# Patient Record
Sex: FEMALE | Race: WHITE | Employment: OTHER | URBAN - NONMETROPOLITAN AREA
[De-identification: names, ages, dates, MRNs, and addresses within clinical notes are randomized per-mention and may not be internally consistent; named-entity substitution may affect disease eponyms.]

---

## 2017-07-27 ENCOUNTER — TELEPHONE (OUTPATIENT)
Dept: FAMILY MEDICINE CLINIC | Age: 67
End: 2017-07-27

## 2017-07-27 DIAGNOSIS — E78.5 HYPERLIPEMIA, IDIOPATHIC FAMILIAL: ICD-10-CM

## 2017-07-27 DIAGNOSIS — R03.0 ELEVATED BLOOD PRESSURE READING WITHOUT DIAGNOSIS OF HYPERTENSION: ICD-10-CM

## 2017-07-27 DIAGNOSIS — F41.9 ANXIETY: Primary | ICD-10-CM

## 2017-07-27 DIAGNOSIS — E83.52 HYPERCALCEMIA: ICD-10-CM

## 2017-07-31 DIAGNOSIS — E78.5 HYPERLIPEMIA, IDIOPATHIC FAMILIAL: ICD-10-CM

## 2017-07-31 DIAGNOSIS — R03.0 ELEVATED BLOOD PRESSURE READING WITHOUT DIAGNOSIS OF HYPERTENSION: ICD-10-CM

## 2017-07-31 DIAGNOSIS — F41.9 ANXIETY: ICD-10-CM

## 2017-07-31 DIAGNOSIS — E83.52 HYPERCALCEMIA: ICD-10-CM

## 2017-07-31 LAB
ALBUMIN SERPL-MCNC: 4 G/DL (ref 3.5–5.2)
ALP BLD-CCNC: 76 U/L (ref 35–104)
ALT SERPL-CCNC: 45 U/L (ref 5–33)
ANION GAP SERPL CALCULATED.3IONS-SCNC: 16 MMOL/L (ref 7–19)
AST SERPL-CCNC: 38 U/L (ref 5–32)
BASOPHILS ABSOLUTE: 0.1 K/UL (ref 0–0.2)
BASOPHILS RELATIVE PERCENT: 0.9 % (ref 0–1)
BILIRUB SERPL-MCNC: 0.6 MG/DL (ref 0.2–1.2)
BUN BLDV-MCNC: 11 MG/DL (ref 8–23)
CALCIUM SERPL-MCNC: 10.6 MG/DL (ref 8.8–10.2)
CHLORIDE BLD-SCNC: 99 MMOL/L (ref 98–111)
CHOLESTEROL, TOTAL: 220 MG/DL (ref 160–199)
CO2: 24 MMOL/L (ref 22–29)
CREAT SERPL-MCNC: 0.6 MG/DL (ref 0.5–0.9)
EOSINOPHILS ABSOLUTE: 0.3 K/UL (ref 0–0.6)
EOSINOPHILS RELATIVE PERCENT: 5.1 % (ref 0–5)
GFR NON-AFRICAN AMERICAN: >60
GLUCOSE BLD-MCNC: 86 MG/DL (ref 74–109)
HCT VFR BLD CALC: 39.7 % (ref 37–47)
HDLC SERPL-MCNC: 74 MG/DL (ref 65–121)
HEMOGLOBIN: 13.6 G/DL (ref 12–16)
LDL CHOLESTEROL CALCULATED: 130 MG/DL
LYMPHOCYTES ABSOLUTE: 2.5 K/UL (ref 1.1–4.5)
LYMPHOCYTES RELATIVE PERCENT: 42.8 % (ref 20–40)
MCH RBC QN AUTO: 32 PG (ref 27–31)
MCHC RBC AUTO-ENTMCNC: 34.3 G/DL (ref 33–37)
MCV RBC AUTO: 93.4 FL (ref 81–99)
MONOCYTES ABSOLUTE: 0.5 K/UL (ref 0–0.9)
MONOCYTES RELATIVE PERCENT: 7.9 % (ref 0–10)
NEUTROPHILS ABSOLUTE: 2.5 K/UL (ref 1.5–7.5)
NEUTROPHILS RELATIVE PERCENT: 43.1 % (ref 50–65)
PDW BLD-RTO: 12.2 % (ref 11.5–14.5)
PLATELET # BLD: 321 K/UL (ref 130–400)
PMV BLD AUTO: 10.4 FL (ref 9.4–12.3)
POTASSIUM SERPL-SCNC: 4.5 MMOL/L (ref 3.5–5)
RBC # BLD: 4.25 M/UL (ref 4.2–5.4)
SODIUM BLD-SCNC: 139 MMOL/L (ref 136–145)
TOTAL PROTEIN: 8.7 G/DL (ref 6.6–8.7)
TRIGL SERPL-MCNC: 81 MG/DL (ref 150–199)
WBC # BLD: 5.7 K/UL (ref 4.8–10.8)

## 2017-08-04 ENCOUNTER — OFFICE VISIT (OUTPATIENT)
Dept: FAMILY MEDICINE CLINIC | Age: 67
End: 2017-08-04
Payer: MEDICARE

## 2017-08-04 VITALS
BODY MASS INDEX: 21.94 KG/M2 | HEIGHT: 62 IN | DIASTOLIC BLOOD PRESSURE: 76 MMHG | HEART RATE: 81 BPM | OXYGEN SATURATION: 99 % | SYSTOLIC BLOOD PRESSURE: 124 MMHG | WEIGHT: 119.2 LBS

## 2017-08-04 DIAGNOSIS — E78.2 MIXED HYPERLIPIDEMIA: ICD-10-CM

## 2017-08-04 DIAGNOSIS — R77.9 ELEVATED SERUM PROTEIN LEVEL: ICD-10-CM

## 2017-08-04 DIAGNOSIS — M53.3 COCCYX PAIN: ICD-10-CM

## 2017-08-04 DIAGNOSIS — Z00.00 ROUTINE GENERAL MEDICAL EXAMINATION AT A HEALTH CARE FACILITY: Primary | ICD-10-CM

## 2017-08-04 DIAGNOSIS — Z12.31 ENCOUNTER FOR SCREENING MAMMOGRAM FOR BREAST CANCER: ICD-10-CM

## 2017-08-04 DIAGNOSIS — J39.2 THROAT IRRITATION: ICD-10-CM

## 2017-08-04 DIAGNOSIS — E83.52 HYPERCALCEMIA: ICD-10-CM

## 2017-08-04 PROCEDURE — G0403 EKG FOR INITIAL PREVENT EXAM: HCPCS | Performed by: CLINICAL NURSE SPECIALIST

## 2017-08-04 PROCEDURE — G8400 PT W/DXA NO RESULTS DOC: HCPCS | Performed by: CLINICAL NURSE SPECIALIST

## 2017-08-04 PROCEDURE — G8420 CALC BMI NORM PARAMETERS: HCPCS | Performed by: CLINICAL NURSE SPECIALIST

## 2017-08-04 PROCEDURE — 1123F ACP DISCUSS/DSCN MKR DOCD: CPT | Performed by: CLINICAL NURSE SPECIALIST

## 2017-08-04 PROCEDURE — 99213 OFFICE O/P EST LOW 20 MIN: CPT | Performed by: CLINICAL NURSE SPECIALIST

## 2017-08-04 PROCEDURE — 3017F COLORECTAL CA SCREEN DOC REV: CPT | Performed by: CLINICAL NURSE SPECIALIST

## 2017-08-04 PROCEDURE — 1090F PRES/ABSN URINE INCON ASSESS: CPT | Performed by: CLINICAL NURSE SPECIALIST

## 2017-08-04 PROCEDURE — 1036F TOBACCO NON-USER: CPT | Performed by: CLINICAL NURSE SPECIALIST

## 2017-08-04 PROCEDURE — G8427 DOCREV CUR MEDS BY ELIG CLIN: HCPCS | Performed by: CLINICAL NURSE SPECIALIST

## 2017-08-04 PROCEDURE — 3014F SCREEN MAMMO DOC REV: CPT | Performed by: CLINICAL NURSE SPECIALIST

## 2017-08-04 PROCEDURE — 4040F PNEUMOC VAC/ADMIN/RCVD: CPT | Performed by: CLINICAL NURSE SPECIALIST

## 2017-08-04 PROCEDURE — 93000 ELECTROCARDIOGRAM COMPLETE: CPT | Performed by: CLINICAL NURSE SPECIALIST

## 2017-08-04 ASSESSMENT — ENCOUNTER SYMPTOMS
CONSTIPATION: 0
NAUSEA: 0
EYE PAIN: 0
VOICE CHANGE: 1
SHORTNESS OF BREATH: 0
BACK PAIN: 1
WHEEZING: 0
DIARRHEA: 0
SINUS PRESSURE: 0
SORE THROAT: 1
FACIAL SWELLING: 0
ABDOMINAL PAIN: 0
EYE REDNESS: 0
TROUBLE SWALLOWING: 0
CHEST TIGHTNESS: 0
COUGH: 0
RECTAL PAIN: 0

## 2017-08-04 ASSESSMENT — LIFESTYLE VARIABLES: HOW OFTEN DO YOU HAVE A DRINK CONTAINING ALCOHOL: 0

## 2017-08-04 ASSESSMENT — VISUAL ACUITY
OS_CC: 20/20
OD_CC: 20/25

## 2017-08-04 ASSESSMENT — ANXIETY QUESTIONNAIRES: GAD7 TOTAL SCORE: 2

## 2017-08-04 ASSESSMENT — PATIENT HEALTH QUESTIONNAIRE - PHQ9: SUM OF ALL RESPONSES TO PHQ QUESTIONS 1-9: 0

## 2017-08-08 ENCOUNTER — TELEPHONE (OUTPATIENT)
Dept: FAMILY MEDICINE CLINIC | Age: 67
End: 2017-08-08

## 2017-08-24 ENCOUNTER — OFFICE VISIT (OUTPATIENT)
Dept: OTOLARYNGOLOGY | Age: 67
End: 2017-08-24
Payer: MEDICARE

## 2017-08-24 VITALS
SYSTOLIC BLOOD PRESSURE: 122 MMHG | BODY MASS INDEX: 21.71 KG/M2 | OXYGEN SATURATION: 99 % | HEART RATE: 68 BPM | DIASTOLIC BLOOD PRESSURE: 60 MMHG | WEIGHT: 118 LBS | RESPIRATION RATE: 20 BRPM | TEMPERATURE: 96.6 F | HEIGHT: 62 IN

## 2017-08-24 DIAGNOSIS — J39.2 THROAT IRRITATION: ICD-10-CM

## 2017-08-24 DIAGNOSIS — K21.9 LARYNGOPHARYNGEAL REFLUX (LPR): Primary | ICD-10-CM

## 2017-08-24 PROCEDURE — 1123F ACP DISCUSS/DSCN MKR DOCD: CPT | Performed by: OTOLARYNGOLOGY

## 2017-08-24 PROCEDURE — 1090F PRES/ABSN URINE INCON ASSESS: CPT | Performed by: OTOLARYNGOLOGY

## 2017-08-24 PROCEDURE — 4040F PNEUMOC VAC/ADMIN/RCVD: CPT | Performed by: OTOLARYNGOLOGY

## 2017-08-24 PROCEDURE — 3017F COLORECTAL CA SCREEN DOC REV: CPT | Performed by: OTOLARYNGOLOGY

## 2017-08-24 PROCEDURE — G8427 DOCREV CUR MEDS BY ELIG CLIN: HCPCS | Performed by: OTOLARYNGOLOGY

## 2017-08-24 PROCEDURE — 31575 DIAGNOSTIC LARYNGOSCOPY: CPT | Performed by: OTOLARYNGOLOGY

## 2017-08-24 PROCEDURE — 3014F SCREEN MAMMO DOC REV: CPT | Performed by: OTOLARYNGOLOGY

## 2017-08-24 PROCEDURE — G8400 PT W/DXA NO RESULTS DOC: HCPCS | Performed by: OTOLARYNGOLOGY

## 2017-08-24 PROCEDURE — G8420 CALC BMI NORM PARAMETERS: HCPCS | Performed by: OTOLARYNGOLOGY

## 2017-08-24 PROCEDURE — 1036F TOBACCO NON-USER: CPT | Performed by: OTOLARYNGOLOGY

## 2017-08-24 RX ORDER — RANITIDINE 150 MG/1
150 TABLET ORAL NIGHTLY
Qty: 60 TABLET | Refills: 3 | Status: SHIPPED | OUTPATIENT
Start: 2017-08-24 | End: 2017-11-30 | Stop reason: CLARIF

## 2017-09-19 RX ORDER — ALPRAZOLAM 0.5 MG/1
0.5 TABLET ORAL NIGHTLY PRN
Qty: 30 TABLET | Refills: 0 | Status: SHIPPED | OUTPATIENT
Start: 2017-09-19 | End: 2017-10-19 | Stop reason: SDUPTHER

## 2017-10-19 RX ORDER — ALPRAZOLAM 0.5 MG/1
0.5 TABLET ORAL NIGHTLY PRN
Qty: 30 TABLET | Refills: 0 | Status: SHIPPED | OUTPATIENT
Start: 2017-10-19 | End: 2017-12-06 | Stop reason: SDUPTHER

## 2017-11-30 ENCOUNTER — OFFICE VISIT (OUTPATIENT)
Dept: FAMILY MEDICINE CLINIC | Age: 67
End: 2017-11-30
Payer: MEDICARE

## 2017-11-30 VITALS
HEIGHT: 62 IN | TEMPERATURE: 98.1 F | OXYGEN SATURATION: 99 % | DIASTOLIC BLOOD PRESSURE: 70 MMHG | SYSTOLIC BLOOD PRESSURE: 134 MMHG | HEART RATE: 75 BPM | BODY MASS INDEX: 22.08 KG/M2 | WEIGHT: 120 LBS

## 2017-11-30 DIAGNOSIS — B02.9 HERPES ZOSTER WITHOUT COMPLICATION: Primary | ICD-10-CM

## 2017-11-30 PROCEDURE — 1123F ACP DISCUSS/DSCN MKR DOCD: CPT | Performed by: FAMILY MEDICINE

## 2017-11-30 PROCEDURE — G8484 FLU IMMUNIZE NO ADMIN: HCPCS | Performed by: FAMILY MEDICINE

## 2017-11-30 PROCEDURE — 3017F COLORECTAL CA SCREEN DOC REV: CPT | Performed by: FAMILY MEDICINE

## 2017-11-30 PROCEDURE — 1090F PRES/ABSN URINE INCON ASSESS: CPT | Performed by: FAMILY MEDICINE

## 2017-11-30 PROCEDURE — 1036F TOBACCO NON-USER: CPT | Performed by: FAMILY MEDICINE

## 2017-11-30 PROCEDURE — G8400 PT W/DXA NO RESULTS DOC: HCPCS | Performed by: FAMILY MEDICINE

## 2017-11-30 PROCEDURE — 99213 OFFICE O/P EST LOW 20 MIN: CPT | Performed by: FAMILY MEDICINE

## 2017-11-30 PROCEDURE — 4040F PNEUMOC VAC/ADMIN/RCVD: CPT | Performed by: FAMILY MEDICINE

## 2017-11-30 PROCEDURE — G8420 CALC BMI NORM PARAMETERS: HCPCS | Performed by: FAMILY MEDICINE

## 2017-11-30 PROCEDURE — G8427 DOCREV CUR MEDS BY ELIG CLIN: HCPCS | Performed by: FAMILY MEDICINE

## 2017-11-30 PROCEDURE — 3014F SCREEN MAMMO DOC REV: CPT | Performed by: FAMILY MEDICINE

## 2017-11-30 RX ORDER — ACYCLOVIR 800 MG/1
800 TABLET ORAL
Qty: 35 TABLET | Refills: 0 | Status: SHIPPED | OUTPATIENT
Start: 2017-11-30 | End: 2017-12-07

## 2017-11-30 NOTE — PROGRESS NOTES
[address] Mejia  [address] [facility]  [address] [facility] [address]  Dept: [phone]  Dept Fax: [phone]  Loc: [phone]    Parvez Jc is a 74 y.o. female who presents today for her medical conditions/complaints as noted below. Parvze Jc is c/o of Rash (possible shingles on right buttock)        HPI:   Patient is here for 5 day hx of rash on right buttock. She has been using nystatin with no relief. She has pruritis. She reports \"bruised type pain\" in right hip. No fever. She reports nausea last night. No nausea, vomiting. HPI    Past Medical History:   Diagnosis Date    Anxiety     Elevated blood pressure reading     Elevated serum protein level     Hypercalcemia     Hyperlipidemia       Past Surgical History:   Procedure Laterality Date    HYSTERECTOMY      TIBIA FRACTURE SURGERY Left     with pradeep in lower left leg       Family History   Problem Relation Age of Onset    High Blood Pressure Mother     Cancer Father     High Cholesterol Brother        Social History   Substance Use Topics    Smoking status: Never Smoker    Smokeless tobacco: Never Used    Alcohol use No      Current Outpatient Prescriptions   Medication Sig Dispense Refill    ALPRAZolam (XANAX) 0.5 MG tablet Take 1 tablet by mouth nightly as needed for Sleep . 30 tablet 0    nystatin (MYCOSTATIN) 946196 UNIT/ML suspension Take 5 mLs by mouth 4 times daily 1 Bottle 1    Ascorbic Acid (VITAMIN C) 500 MG CAPS Take 1 tablet by mouth daily.  aspirin 81 MG tablet Take 81 mg by mouth daily.  melatonin 3 MG TABS tablet Take 3 mg by mouth nightly.  L-LYSINE PO Take 1 tablet by mouth daily. No current facility-administered medications for this visit.       Allergies   Allergen Reactions    Darvocet A500 [Propoxyphene N-Acetaminophen]     Lortab [Hydrocodone-Acetaminophen]     Penicillins      All \"Cillins\"       Health Maintenance   Topic Date Due   

## 2017-12-07 RX ORDER — ALPRAZOLAM 0.5 MG/1
0.5 TABLET ORAL NIGHTLY PRN
Qty: 30 TABLET | Refills: 0 | Status: SHIPPED | OUTPATIENT
Start: 2017-12-07 | End: 2018-01-17 | Stop reason: SDUPTHER

## 2017-12-24 PROBLEM — B02.9 HERPES ZOSTER WITHOUT COMPLICATION: Status: ACTIVE | Noted: 2017-12-24

## 2017-12-24 ASSESSMENT — ENCOUNTER SYMPTOMS
CONSTIPATION: 0
ANAL BLEEDING: 0
COUGH: 0
SHORTNESS OF BREATH: 0
DIARRHEA: 0
CHEST TIGHTNESS: 0
ABDOMINAL PAIN: 0
NAUSEA: 0

## 2018-01-17 RX ORDER — ALPRAZOLAM 0.5 MG/1
0.5 TABLET ORAL NIGHTLY PRN
Qty: 30 TABLET | Refills: 0 | Status: SHIPPED | OUTPATIENT
Start: 2018-01-17 | End: 2018-02-16

## 2018-01-29 DIAGNOSIS — R77.9 ELEVATED SERUM PROTEIN LEVEL: ICD-10-CM

## 2018-01-29 DIAGNOSIS — E83.52 HYPERCALCEMIA: ICD-10-CM

## 2018-01-29 DIAGNOSIS — E78.2 MIXED HYPERLIPIDEMIA: ICD-10-CM

## 2018-01-29 LAB
ALBUMIN SERPL-MCNC: 4.1 G/DL (ref 3.5–5.2)
ALP BLD-CCNC: 76 U/L (ref 35–104)
ALT SERPL-CCNC: 32 U/L (ref 5–33)
ANION GAP SERPL CALCULATED.3IONS-SCNC: 7 MMOL/L (ref 7–19)
AST SERPL-CCNC: 23 U/L (ref 5–32)
BILIRUB SERPL-MCNC: 0.6 MG/DL (ref 0.2–1.2)
BUN BLDV-MCNC: 9 MG/DL (ref 8–23)
CALCIUM SERPL-MCNC: 10 MG/DL (ref 8.8–10.2)
CHLORIDE BLD-SCNC: 99 MMOL/L (ref 98–111)
CHOLESTEROL, TOTAL: 222 MG/DL (ref 160–199)
CO2: 32 MMOL/L (ref 22–29)
CREAT SERPL-MCNC: 0.6 MG/DL (ref 0.5–0.9)
GFR NON-AFRICAN AMERICAN: >60
GLUCOSE BLD-MCNC: 93 MG/DL (ref 74–109)
HDLC SERPL-MCNC: 82 MG/DL (ref 65–121)
LDL CHOLESTEROL CALCULATED: 123 MG/DL
POTASSIUM SERPL-SCNC: 4.2 MMOL/L (ref 3.5–5)
SODIUM BLD-SCNC: 138 MMOL/L (ref 136–145)
TOTAL PROTEIN: 8.3 G/DL (ref 6.6–8.7)
TRIGL SERPL-MCNC: 83 MG/DL (ref 0–149)

## 2018-02-05 ENCOUNTER — OFFICE VISIT (OUTPATIENT)
Dept: FAMILY MEDICINE CLINIC | Age: 68
End: 2018-02-05
Payer: MEDICARE

## 2018-02-05 VITALS
HEART RATE: 79 BPM | OXYGEN SATURATION: 99 % | SYSTOLIC BLOOD PRESSURE: 138 MMHG | WEIGHT: 120 LBS | DIASTOLIC BLOOD PRESSURE: 72 MMHG | HEIGHT: 62 IN | BODY MASS INDEX: 22.08 KG/M2 | TEMPERATURE: 98.6 F

## 2018-02-05 DIAGNOSIS — R77.9 ELEVATED SERUM PROTEIN LEVEL: ICD-10-CM

## 2018-02-05 DIAGNOSIS — E78.2 MIXED HYPERLIPIDEMIA: ICD-10-CM

## 2018-02-05 DIAGNOSIS — E83.52 HYPERCALCEMIA: ICD-10-CM

## 2018-02-05 DIAGNOSIS — E88.09 HYPERPROTEINEMIA: ICD-10-CM

## 2018-02-05 DIAGNOSIS — F41.9 ANXIETY: ICD-10-CM

## 2018-02-05 DIAGNOSIS — Z78.0 POSTMENOPAUSE: ICD-10-CM

## 2018-02-05 DIAGNOSIS — Z79.899 HIGH RISK MEDICATION USE: Primary | ICD-10-CM

## 2018-02-05 PROBLEM — I65.29 CAROTID STENOSIS: Status: ACTIVE | Noted: 2018-02-05

## 2018-02-05 PROBLEM — R03.0 ELEVATED BLOOD PRESSURE READING WITHOUT DIAGNOSIS OF HYPERTENSION: Status: ACTIVE | Noted: 2018-02-05

## 2018-02-05 LAB
AMPHETAMINE SCREEN, URINE: NORMAL
BARBITURATE SCREEN, URINE: NORMAL
BENZODIAZEPINE SCREEN, URINE: POSITIVE
COCAINE METABOLITE SCREEN URINE: NORMAL
MDMA URINE: NORMAL
METHADONE SCREEN, URINE: NORMAL
METHAMPHETAMINE, URINE: NORMAL
OPIATE SCREEN URINE: NORMAL
OXYCODONE SCREEN URINE: NORMAL
PHENCYCLIDINE SCREEN URINE: NORMAL
PROPOXYPHENE SCREEN, URINE: NORMAL
THC: NORMAL
TRICYCLIC ANTIDEPRESSANTS, UR: NORMAL

## 2018-02-05 PROCEDURE — 99213 OFFICE O/P EST LOW 20 MIN: CPT | Performed by: FAMILY MEDICINE

## 2018-02-05 PROCEDURE — G8400 PT W/DXA NO RESULTS DOC: HCPCS | Performed by: FAMILY MEDICINE

## 2018-02-05 PROCEDURE — G8484 FLU IMMUNIZE NO ADMIN: HCPCS | Performed by: FAMILY MEDICINE

## 2018-02-05 PROCEDURE — 3017F COLORECTAL CA SCREEN DOC REV: CPT | Performed by: FAMILY MEDICINE

## 2018-02-05 PROCEDURE — 3014F SCREEN MAMMO DOC REV: CPT | Performed by: FAMILY MEDICINE

## 2018-02-05 PROCEDURE — 4040F PNEUMOC VAC/ADMIN/RCVD: CPT | Performed by: FAMILY MEDICINE

## 2018-02-05 PROCEDURE — G8427 DOCREV CUR MEDS BY ELIG CLIN: HCPCS | Performed by: FAMILY MEDICINE

## 2018-02-05 PROCEDURE — G8420 CALC BMI NORM PARAMETERS: HCPCS | Performed by: FAMILY MEDICINE

## 2018-02-05 PROCEDURE — 1123F ACP DISCUSS/DSCN MKR DOCD: CPT | Performed by: FAMILY MEDICINE

## 2018-02-05 PROCEDURE — 1036F TOBACCO NON-USER: CPT | Performed by: FAMILY MEDICINE

## 2018-02-05 PROCEDURE — 1090F PRES/ABSN URINE INCON ASSESS: CPT | Performed by: FAMILY MEDICINE

## 2018-02-05 PROCEDURE — 80305 DRUG TEST PRSMV DIR OPT OBS: CPT | Performed by: FAMILY MEDICINE

## 2018-02-05 PROCEDURE — G8598 ASA/ANTIPLAT THER USED: HCPCS | Performed by: FAMILY MEDICINE

## 2018-02-05 RX ORDER — ALPRAZOLAM 0.5 MG/1
1 TABLET ORAL 2 TIMES DAILY PRN
COMMUNITY
Start: 2011-07-06 | End: 2018-03-01 | Stop reason: SDUPTHER

## 2018-02-05 ASSESSMENT — ENCOUNTER SYMPTOMS
ABDOMINAL PAIN: 0
COUGH: 0
DIARRHEA: 0
ANAL BLEEDING: 0
CHEST TIGHTNESS: 0
SHORTNESS OF BREATH: 0
CONSTIPATION: 0
NAUSEA: 0

## 2018-02-05 NOTE — PROGRESS NOTES
[address] Mejia  [address] [facility]  [address] [facility] [address]  Dept: [phone]  Dept Fax: [phone]  Loc: [phone]    Krystal Orellana is a 74 y.o. female who presents today for her medical conditions/complaints as noted below. Krystal Orellana is c/o of 6 Month Follow-Up        HPI:   Patient is here for follow up. She is doing well overall. UDS today with positive benzos, takes xanax for prn severe anxiety, usually takes 1/2 tablet of 0.5 mg dose. She takes nightly to help with sleep. No depressed mood. No SI/HI. Labs reviewed, cmp with total protein and calcium wnl. LDL improved to 123, has been working on [employer], regular exercise. Mammogram in August wnl. HPI    Past Medical History:   Diagnosis Date    Anxiety     Elevated blood pressure reading     Elevated serum protein level     Hypercalcemia     Hyperlipidemia       Past Surgical History:   Procedure Laterality Date    HYSTERECTOMY      TIBIA FRACTURE SURGERY Left     with pradeep in lower left leg       Family History   Problem Relation Age of Onset    High Blood Pressure Mother     Cancer Father     High Cholesterol Brother        Social History   Substance Use Topics    Smoking status: Never Smoker    Smokeless tobacco: Never Used    Alcohol use No      Current Outpatient Prescriptions   Medication Sig Dispense Refill    ALPRAZolam (XANAX) 0.5 MG tablet Take 1 tablet by mouth 2 times daily as needed.  ALPRAZolam (XANAX) 0.5 MG tablet Take 1 tablet by mouth nightly as needed for Sleep for up to 30 days. 30 tablet 0    nystatin (MYCOSTATIN) [id number] UNIT/ML suspension Take 5 mLs by mouth 4 times daily 1 Bottle 1    Ascorbic Acid (VITAMIN C) 500 MG CAPS Take 1 tablet by mouth daily.  aspirin 81 MG tablet Take 81 mg by mouth daily.  melatonin 3 MG TABS tablet Take 3 mg by mouth nightly.  L-LYSINE PO Take 1 tablet by mouth daily.        No current mechanisms for stressors, will follow. Hypercalcemia  Stable, not on calcium supplement. Followed by heme onc as well. Elevated serum protein level  Stable, will continue to trend. Mixed hyperlipidemia  Improved, continue working on diet and exercise. Hyperproteinemia    Stable, see above. Schedule DEXA as has not been screened and outside testing with osteopenia in the recent past. No fractures. Return in about 6 months (around 8/5/2018) for Physical, see me. Discussed use, benefit, and side effects of prescribed medications. All patient questions answered. Pt voiced understanding. Reviewed health maintenance. Instructed to continue current medications, diet and exercise. Patient agreed with treatment plan. Follow up as directed.

## 2018-03-01 RX ORDER — ALPRAZOLAM 0.5 MG/1
0.5 TABLET ORAL 2 TIMES DAILY PRN
Qty: 30 TABLET | Refills: 0 | Status: SHIPPED | OUTPATIENT
Start: 2018-03-01 | End: 2018-03-31

## 2018-04-08 RX ORDER — ALPRAZOLAM 0.5 MG/1
0.5 TABLET ORAL 2 TIMES DAILY PRN
Qty: 30 TABLET | Refills: 0 | Status: SHIPPED | OUTPATIENT
Start: 2018-04-08 | End: 2018-05-09 | Stop reason: SDUPTHER

## 2018-05-09 ENCOUNTER — TELEPHONE (OUTPATIENT)
Dept: FAMILY MEDICINE CLINIC | Age: 68
End: 2018-05-09

## 2018-05-09 RX ORDER — ALPRAZOLAM 0.5 MG/1
0.5 TABLET ORAL 2 TIMES DAILY PRN
Qty: 60 TABLET | Refills: 2 | Status: SHIPPED | OUTPATIENT
Start: 2018-05-09 | End: 2018-08-13 | Stop reason: SDUPTHER

## 2018-08-06 DIAGNOSIS — E88.09 HYPERPROTEINEMIA: ICD-10-CM

## 2018-08-06 DIAGNOSIS — E78.2 MIXED HYPERLIPIDEMIA: ICD-10-CM

## 2018-08-06 LAB
ALBUMIN SERPL-MCNC: 4.1 G/DL (ref 3.5–5.2)
ALP BLD-CCNC: 70 U/L (ref 35–104)
ALT SERPL-CCNC: 22 U/L (ref 5–33)
ANION GAP SERPL CALCULATED.3IONS-SCNC: 15 MMOL/L (ref 7–19)
AST SERPL-CCNC: 19 U/L (ref 5–32)
BILIRUB SERPL-MCNC: 0.6 MG/DL (ref 0.2–1.2)
BUN BLDV-MCNC: 10 MG/DL (ref 8–23)
CALCIUM SERPL-MCNC: 10.2 MG/DL (ref 8.8–10.2)
CHLORIDE BLD-SCNC: 98 MMOL/L (ref 98–111)
CHOLESTEROL, TOTAL: 199 MG/DL (ref 160–199)
CO2: 25 MMOL/L (ref 22–29)
CREAT SERPL-MCNC: 0.6 MG/DL (ref 0.5–0.9)
GFR NON-AFRICAN AMERICAN: >60
GLUCOSE BLD-MCNC: 87 MG/DL (ref 74–109)
HDLC SERPL-MCNC: 77 MG/DL (ref 65–121)
LDL CHOLESTEROL CALCULATED: 110 MG/DL
POTASSIUM SERPL-SCNC: 4.1 MMOL/L (ref 3.5–5)
SODIUM BLD-SCNC: 138 MMOL/L (ref 136–145)
TOTAL PROTEIN: 8.5 G/DL (ref 6.6–8.7)
TRIGL SERPL-MCNC: 62 MG/DL (ref 0–149)

## 2018-08-13 ENCOUNTER — OFFICE VISIT (OUTPATIENT)
Dept: FAMILY MEDICINE CLINIC | Age: 68
End: 2018-08-13
Payer: MEDICARE

## 2018-08-13 VITALS
HEART RATE: 85 BPM | BODY MASS INDEX: 21.71 KG/M2 | SYSTOLIC BLOOD PRESSURE: 114 MMHG | HEIGHT: 62 IN | WEIGHT: 118 LBS | DIASTOLIC BLOOD PRESSURE: 72 MMHG | OXYGEN SATURATION: 98 %

## 2018-08-13 DIAGNOSIS — Z12.31 ENCOUNTER FOR SCREENING MAMMOGRAM FOR BREAST CANCER: ICD-10-CM

## 2018-08-13 DIAGNOSIS — Z12.11 ENCOUNTER FOR SCREENING COLONOSCOPY: ICD-10-CM

## 2018-08-13 DIAGNOSIS — E83.52 HYPERCALCEMIA: ICD-10-CM

## 2018-08-13 DIAGNOSIS — Z23 NEED FOR PNEUMOCOCCAL VACCINATION: Primary | ICD-10-CM

## 2018-08-13 DIAGNOSIS — F41.9 ANXIETY: ICD-10-CM

## 2018-08-13 DIAGNOSIS — Z78.0 POSTMENOPAUSE: ICD-10-CM

## 2018-08-13 DIAGNOSIS — E78.2 MIXED HYPERLIPIDEMIA: ICD-10-CM

## 2018-08-13 DIAGNOSIS — Z00.00 ROUTINE GENERAL MEDICAL EXAMINATION AT A HEALTH CARE FACILITY: ICD-10-CM

## 2018-08-13 DIAGNOSIS — Z00.00 ANNUAL PHYSICAL EXAM: ICD-10-CM

## 2018-08-13 DIAGNOSIS — Z78.0 POSTMENOPAUSAL: ICD-10-CM

## 2018-08-13 DIAGNOSIS — E88.09 HYPERPROTEINEMIA: ICD-10-CM

## 2018-08-13 PROBLEM — B02.9 HERPES ZOSTER WITHOUT COMPLICATION: Status: RESOLVED | Noted: 2017-12-24 | Resolved: 2018-08-13

## 2018-08-13 PROCEDURE — G8420 CALC BMI NORM PARAMETERS: HCPCS | Performed by: FAMILY MEDICINE

## 2018-08-13 PROCEDURE — G8400 PT W/DXA NO RESULTS DOC: HCPCS | Performed by: FAMILY MEDICINE

## 2018-08-13 PROCEDURE — G0009 ADMIN PNEUMOCOCCAL VACCINE: HCPCS | Performed by: FAMILY MEDICINE

## 2018-08-13 PROCEDURE — 1123F ACP DISCUSS/DSCN MKR DOCD: CPT | Performed by: FAMILY MEDICINE

## 2018-08-13 PROCEDURE — 1090F PRES/ABSN URINE INCON ASSESS: CPT | Performed by: FAMILY MEDICINE

## 2018-08-13 PROCEDURE — G8427 DOCREV CUR MEDS BY ELIG CLIN: HCPCS | Performed by: FAMILY MEDICINE

## 2018-08-13 PROCEDURE — 90670 PCV13 VACCINE IM: CPT | Performed by: FAMILY MEDICINE

## 2018-08-13 PROCEDURE — G0438 PPPS, INITIAL VISIT: HCPCS | Performed by: FAMILY MEDICINE

## 2018-08-13 PROCEDURE — 3017F COLORECTAL CA SCREEN DOC REV: CPT | Performed by: FAMILY MEDICINE

## 2018-08-13 PROCEDURE — 99213 OFFICE O/P EST LOW 20 MIN: CPT | Performed by: FAMILY MEDICINE

## 2018-08-13 PROCEDURE — 4040F PNEUMOC VAC/ADMIN/RCVD: CPT | Performed by: FAMILY MEDICINE

## 2018-08-13 PROCEDURE — 1101F PT FALLS ASSESS-DOCD LE1/YR: CPT | Performed by: FAMILY MEDICINE

## 2018-08-13 PROCEDURE — G8598 ASA/ANTIPLAT THER USED: HCPCS | Performed by: FAMILY MEDICINE

## 2018-08-13 PROCEDURE — 1036F TOBACCO NON-USER: CPT | Performed by: FAMILY MEDICINE

## 2018-08-13 RX ORDER — ALPRAZOLAM 0.5 MG/1
0.5 TABLET ORAL 2 TIMES DAILY PRN
Qty: 60 TABLET | Refills: 2 | Status: SHIPPED | OUTPATIENT
Start: 2018-08-13 | End: 2019-01-24 | Stop reason: SDUPTHER

## 2018-08-13 ASSESSMENT — PATIENT HEALTH QUESTIONNAIRE - PHQ9
SUM OF ALL RESPONSES TO PHQ QUESTIONS 1-9: 0
SUM OF ALL RESPONSES TO PHQ QUESTIONS 1-9: 0

## 2018-08-13 ASSESSMENT — ANXIETY QUESTIONNAIRES: GAD7 TOTAL SCORE: 1

## 2018-08-13 ASSESSMENT — LIFESTYLE VARIABLES: HOW OFTEN DO YOU HAVE A DRINK CONTAINING ALCOHOL: 0

## 2018-08-13 NOTE — PROGRESS NOTES
times daily as needed for Anxiety for up to 30 days. Marlene Moreno MD   nystatin (MYCOSTATIN) 453635 UNIT/ML suspension Take 5 mLs by mouth 4 times daily  Orville Hickman MD   Ascorbic Acid (VITAMIN C) 500 MG CAPS Take 1 tablet by mouth daily. Historical Provider, MD   aspirin 81 MG tablet Take 81 mg by mouth daily. Historical Provider, MD   melatonin 3 MG TABS tablet Take 3 mg by mouth nightly. Historical Provider, MD   L-LYSINE PO Take 1 tablet by mouth daily. Historical Provider, MD       Past Medical History:   Diagnosis Date    Anxiety     Elevated blood pressure reading     Elevated serum protein level     Hypercalcemia     Hyperlipidemia      Past Surgical History:   Procedure Laterality Date    HYSTERECTOMY      TIBIA FRACTURE SURGERY Left     with pradeep in lower left leg       Family History   Problem Relation Age of Onset    High Blood Pressure Mother     Cancer Father     High Cholesterol Brother        CareTeam (Including outside providers/suppliers regularly involved in providing care):   Patient Care Team:  Orville Hickman MD as PCP - General (Family Medicine)    Wt Readings from Last 3 Encounters:   08/13/18 118 lb (53.5 kg)   02/05/18 120 lb (54.4 kg)   11/30/17 120 lb (54.4 kg)     Vitals:    08/13/18 1439   BP: 114/72   Pulse: 85   SpO2: 98%   Weight: 118 lb (53.5 kg)   Height: 5' 2\" (1.575 m)     Body mass index is 21.58 kg/m². General Appearance: alert and oriented to person, place and time, well developed and well- nourished, in no acute distress  Skin: warm and dry, no rash or erythema  Head: normocephalic and atraumatic  Eyes: pupils equal, round, and reactive to light, extraocular eye movements intact, conjunctivae normal  ENT: tympanic membrane, external ear and ear canal normal bilaterally, nose without deformity, nasal mucosa and turbinates normal without polyps  Neck: supple and non-tender without mass, no thyromegaly or thyroid nodules, no cervical

## 2018-08-23 ASSESSMENT — ENCOUNTER SYMPTOMS
CHEST TIGHTNESS: 0
ANAL BLEEDING: 0
DIARRHEA: 0
SHORTNESS OF BREATH: 0
CONSTIPATION: 0
COUGH: 0
ABDOMINAL PAIN: 0
NAUSEA: 0

## 2018-08-23 NOTE — PROGRESS NOTES
tablet by mouth daily.  aspirin 81 MG tablet Take 81 mg by mouth daily.  melatonin 3 MG TABS tablet Take 3 mg by mouth nightly.  L-LYSINE PO Take 1 tablet by mouth daily. No current facility-administered medications for this visit. Allergies   Allergen Reactions    Darvocet A500 [Propoxyphene N-Acetaminophen]     Propoxyphene     Albuterol Palpitations    Clarithromycin Palpitations    Lortab [Hydrocodone-Acetaminophen] Nausea Only and Nausea And Vomiting    Penicillins Nausea Only and Nausea And Vomiting     All \"Cillins\"       Health Maintenance   Topic Date Due    Hepatitis C screen  1950    Shingles Vaccine (1 of 2 - 2 Dose Series) 07/22/2002    DEXA (modify frequency per FRAX score)  07/22/2017    Flu vaccine (1) 09/01/2018    Colon Cancer Screen FIT/FOBT  11/09/2018    Breast cancer screen  08/04/2019    Pneumococcal low/med risk (2 of 2 - PPSV23) 08/13/2019    DTaP/Tdap/Td vaccine (2 - Td) 07/06/2021    Lipid screen  08/06/2023       Subjective:      Review of Systems   Constitutional: Negative for chills and fever. HENT: Negative for congestion. Respiratory: Negative for cough, chest tightness and shortness of breath. Cardiovascular: Negative for chest pain, palpitations and leg swelling. Gastrointestinal: Negative for abdominal pain, anal bleeding, constipation, diarrhea and nausea. Genitourinary: Negative for difficulty urinating. Psychiatric/Behavioral: Negative. See HPI for visit specific review of symptoms. All others negative      Objective:   /72   Pulse 85   Ht 5' 2\" (1.575 m)   Wt 118 lb (53.5 kg)   SpO2 98%   BMI 21.58 kg/m²   Physical Exam   Constitutional: She appears well-developed. She does not appear ill. Eyes: Pupils are equal, round, and reactive to light. Neck: Normal range of motion. Neck supple. Cardiovascular: Normal rate and regular rhythm. Exam reveals no friction rub.     No murmur heard.  Pulmonary/Chest: Effort normal and breath sounds normal. No respiratory distress. She has no wheezes. She has no rales. Abdominal: Soft. Bowel sounds are normal. She exhibits no distension. There is no tenderness. There is no rebound and no guarding. Musculoskeletal: She exhibits no edema. Neurological: She is alert. Psychiatric: She has a normal mood and affect. Her behavior is normal.         Lab Review   Recent Results (from the past 672 hour(s))   Comprehensive Metabolic Panel    Collection Time: 08/06/18  8:02 AM   Result Value Ref Range    Sodium 138 136 - 145 mmol/L    Potassium 4.1 3.5 - 5.0 mmol/L    Chloride 98 98 - 111 mmol/L    CO2 25 22 - 29 mmol/L    Anion Gap 15 7 - 19 mmol/L    Glucose 87 74 - 109 mg/dL    BUN 10 8 - 23 mg/dL    CREATININE 0.6 0.5 - 0.9 mg/dL    GFR Non-African American >60 >60    Calcium 10.2 8.8 - 10.2 mg/dL    Total Protein 8.5 6.6 - 8.7 g/dL    Alb 4.1 3.5 - 5.2 g/dL    Total Bilirubin 0.6 0.2 - 1.2 mg/dL    Alkaline Phosphatase 70 35 - 104 U/L    ALT 22 5 - 33 U/L    AST 19 5 - 32 U/L   Lipid Panel    Collection Time: 08/06/18  8:02 AM   Result Value Ref Range    Cholesterol, Total 199 160 - 199 mg/dL    Triglycerides 62 0 - 149 mg/dL    HDL 77 65 - 121 mg/dL    LDL Calculated 110 <100 mg/dL               Assessment & Plan: The following diagnoses and conditions are stable with no further orders unless indicated:  Catrachito Welch was seen today for medicare awv. Diagnoses and all orders for this visit:    Need for pneumococcal vaccination  -     PREVNAR 13 IM (Pneumococcal conjugate vaccine 13-valent)    Annual physical exam  See other note. Encounter for screening colonoscopy  -     External Referral To Gastroenterology    Postmenopausal  -     DEXA BONE DENSITY 2 SITES; Future    Encounter for screening mammogram for breast cancer  -     DELMY DIGITAL SCREEN W CAD BILATERAL; Future    Anxiety  -     ALPRAZolam (XANAX) 0.5 MG tablet;  Take 1 tablet by mouth 2 times daily as needed for Anxiety for up to 30 days. Wolfgang Kidd UTD. Encouraged coping mechanisms for stressors. Xanax prn. Mixed hyperlipidemia  Stable, improved, continue lifestyle modification. Hypercalcemia  Stable. Hyperproteinemia  Stable. Postmenopause  Schedule DEXA. Routine general medical examination at a health care facility    Labs reviewed and discussed. Encouraged healthy diet and regular exercise. Immunizations UTD. See HPI for further details. Return for Medicare Annual Wellness Visit in 1 year. Discussed use, benefit, and side effects of prescribed medications. All patient questions answered. Pt voiced understanding. Reviewed health maintenance. Instructed to continue current medications, diet and exercise. Patient agreed with treatment plan. Follow up as directed.

## 2018-09-04 DIAGNOSIS — Z78.0 POSTMENOPAUSAL: ICD-10-CM

## 2018-09-04 DIAGNOSIS — Z78.0 POSTMENOPAUSE: ICD-10-CM

## 2018-09-04 DIAGNOSIS — Z12.31 ENCOUNTER FOR SCREENING MAMMOGRAM FOR BREAST CANCER: ICD-10-CM

## 2018-09-12 ENCOUNTER — TELEPHONE (OUTPATIENT)
Dept: GASTROENTEROLOGY | Facility: CLINIC | Age: 68
End: 2018-09-12

## 2018-09-12 NOTE — TELEPHONE ENCOUNTER
Pt records from physician reviewed  According to medical record Noreen Anderson does not have a history of heart disease or lung disease.  According to medical record Noreen Anderson is not currently on blood thinner.  According to medical record Noreen Anderson is not currently exhibiting abdominal pain, weight loss, hematochezia, melena,  change in bowels, or other symptoms to indicate pt would need to be seen in office.    Will submit order for Noreen Anderson to proceed with CScope    Medication will be verified as well as a lack of GI related symptomology when pt is scheduled for procedure.      CScope (Dr Quintana) 2009-normal

## 2018-09-13 PROBLEM — J39.2 THROAT IRRITATION: Status: ACTIVE | Noted: 2017-08-04

## 2018-09-13 PROBLEM — F41.9 ANXIETY: Status: ACTIVE | Noted: 2018-02-05

## 2018-09-13 PROBLEM — M53.3 COCCYX PAIN: Status: ACTIVE | Noted: 2017-08-04

## 2018-09-13 PROBLEM — R77.9 ELEVATED SERUM PROTEIN LEVEL: Status: ACTIVE | Noted: 2018-09-13

## 2018-09-13 PROBLEM — R03.0 ELEVATED BLOOD-PRESSURE READING WITHOUT DIAGNOSIS OF HYPERTENSION: Status: ACTIVE | Noted: 2018-02-05

## 2018-09-13 PROBLEM — E88.09 HYPERPROTEINEMIA: Status: ACTIVE | Noted: 2018-02-05

## 2018-09-13 PROBLEM — E83.52 HYPERCALCEMIA: Status: ACTIVE | Noted: 2018-09-13

## 2018-09-13 PROBLEM — Z78.0 POSTMENOPAUSE: Status: ACTIVE | Noted: 2018-02-05

## 2018-09-13 PROBLEM — E78.5 HYPERLIPIDEMIA: Status: ACTIVE | Noted: 2018-09-13

## 2018-09-13 PROBLEM — I65.29 CAROTID STENOSIS: Status: ACTIVE | Noted: 2018-02-05

## 2018-09-13 RX ORDER — LANOLIN ALCOHOL/MO/W.PET/CERES
3 CREAM (GRAM) TOPICAL
COMMUNITY

## 2018-09-13 RX ORDER — MULTIVIT-MIN/IRON/FOLIC ACID/K 18-600-40
CAPSULE ORAL
COMMUNITY

## 2018-10-04 ENCOUNTER — OFFICE VISIT (OUTPATIENT)
Dept: GASTROENTEROLOGY | Facility: CLINIC | Age: 68
End: 2018-10-04

## 2018-10-04 VITALS
OXYGEN SATURATION: 100 % | HEART RATE: 73 BPM | HEIGHT: 61 IN | WEIGHT: 117.2 LBS | TEMPERATURE: 96.7 F | BODY MASS INDEX: 22.13 KG/M2 | SYSTOLIC BLOOD PRESSURE: 144 MMHG | DIASTOLIC BLOOD PRESSURE: 84 MMHG

## 2018-10-04 DIAGNOSIS — Z12.11 ENCOUNTER FOR SCREENING FOR MALIGNANT NEOPLASM OF COLON: Primary | ICD-10-CM

## 2018-10-04 PROCEDURE — S0260 H&P FOR SURGERY: HCPCS | Performed by: NURSE PRACTITIONER

## 2018-10-04 RX ORDER — ALPRAZOLAM 0.5 MG/1
0.5 TABLET ORAL 2 TIMES DAILY PRN
COMMUNITY

## 2018-10-04 RX ORDER — DOCUSATE SODIUM 100 MG/1
100 CAPSULE, LIQUID FILLED ORAL DAILY
COMMUNITY

## 2018-10-04 RX ORDER — MELATONIN
1000 2 TIMES DAILY
COMMUNITY

## 2018-10-04 NOTE — PROGRESS NOTES
Chief Complaint   Patient presents with   • Colon Cancer Screening     Colonoscopy 8/31/2009.   • Irritable Bowel Syndrome     Subjective   HPI    Noreen Anderson is a 68 y.o. female who presents to office for preventative maintenance.  There is not  a personal history of colon polyps.  There is not a history of colon cancer.  She does not have complaints of nausea/vomiting, change in bowels, weight loss, no BRBPR, no melena.  There is not a family history of colon cancer.  There is not a family history of colon polyps.  Pt last colonoscopy-2009 .  Bowels do move on regular basis.  Pt states she has a hx of IBS x 10 yrs.  Bowels move regular for most part.    CScope (Dr Quintana) 2009-normal    Past Medical History:   Diagnosis Date   • Anxiety    • Carotid stenosis    • Coccyx pain    • Elevated serum protein level    • Hypercalcemia    • Hyperlipemia    • Hyperlipidemia    • Hypertension    • Postmenopause    • Throat irritation      Past Surgical History:   Procedure Laterality Date   • COLONOSCOPY  08/31/2009    internal hemorrhoids  normal colon   • HYSTERECTOMY     • TIBIA FRACTURE SURGERY Left      Outpatient Prescriptions Marked as Taking for the 10/4/18 encounter (Office Visit) with Miko Johnson APRN   Medication Sig Dispense Refill   • ALPRAZolam (XANAX) 0.5 MG tablet Take 0.5 mg by mouth 2 (Two) Times a Day As Needed for Anxiety. Takes 1 prn.     • Ascorbic Acid (VITAMIN C) 500 MG capsule Take  by mouth.     • aspirin 81 MG tablet Take 81 mg by mouth.     • cholecalciferol (VITAMIN D3) 1000 units tablet Take 1,000 Units by mouth 2 (Two) Times a Day.     • docusate sodium (COLACE) 100 MG capsule Take 100 mg by mouth Daily.     • L-LYSINE PO Take  by mouth.     • melatonin 3 MG tablet Take 3 mg by mouth.       Allergies   Allergen Reactions   • Propoxyphene Unknown (See Comments)   • Sertraline Hcl Unknown (See Comments)   • Albuterol Palpitations   • Clarithromycin Palpitations   •  "Hydrocodone-Acetaminophen Nausea And Vomiting and Nausea Only   • Penicillins Nausea And Vomiting and Nausea Only     All \"Cillins\"     Social History     Social History   • Marital status:      Spouse name: N/A   • Number of children: N/A   • Years of education: N/A     Occupational History   • Not on file.     Social History Main Topics   • Smoking status: Never Smoker   • Smokeless tobacco: Never Used   • Alcohol use No   • Drug use: Unknown   • Sexual activity: Defer     Other Topics Concern   • Not on file     Social History Narrative   • No narrative on file     Family History   Problem Relation Age of Onset   • Colon cancer Neg Hx    • Colon polyps Neg Hx    • Esophageal cancer Neg Hx    • Rectal cancer Neg Hx      Review of Systems   Constitutional: Negative for fatigue, fever and unexpected weight change.   HENT: Negative for hearing loss, sore throat and voice change.    Eyes: Negative for visual disturbance.   Respiratory: Negative for cough, shortness of breath and wheezing.    Cardiovascular: Negative for chest pain and palpitations.   Gastrointestinal: Negative for abdominal pain, blood in stool and vomiting.   Endocrine: Negative for polydipsia and polyuria.   Genitourinary: Negative for difficulty urinating, dysuria, hematuria and urgency.   Musculoskeletal: Negative for joint swelling and myalgias.   Skin: Negative for color change, rash and wound.   Neurological: Negative for dizziness, tremors, seizures and syncope.   Hematological: Does not bruise/bleed easily.   Psychiatric/Behavioral: Negative for agitation and confusion. The patient is not nervous/anxious.      Objective   Vitals:    10/04/18 1258   BP: 144/84   Pulse: 73   Temp: 96.7 °F (35.9 °C)   SpO2: 100%     Physical Exam   Constitutional: She is oriented to person, place, and time. She appears well-developed and well-nourished. She is cooperative.   HENT:   Head: Normocephalic and atraumatic.   Eyes: Pupils are equal, round, and " reactive to light. Conjunctivae are normal. No scleral icterus.   Neck: Normal range of motion. Neck supple. No JVD present. No thyroid mass and no thyromegaly present.   Cardiovascular: Normal rate, regular rhythm and normal heart sounds.  Exam reveals no gallop and no friction rub.    No murmur heard.  Pulmonary/Chest: Effort normal and breath sounds normal. No accessory muscle usage. No respiratory distress. She has no wheezes. She has no rales.   Abdominal: Soft. Normal appearance and bowel sounds are normal. She exhibits no distension, no ascites and no mass. There is no hepatosplenomegaly. There is no tenderness. There is no rebound and no guarding.   Musculoskeletal: Normal range of motion. She exhibits no edema or tenderness.     Vascular Status -  Her right foot exhibits normal foot vasculature  and no edema. Her left foot exhibits normal foot vasculature  and no edema.  Lymphadenopathy:     She has no cervical adenopathy.   Neurological: She is alert and oriented to person, place, and time. She has normal strength. Gait normal.   Skin: Skin is warm, dry and intact. No rash noted.     Imaging Results (most recent)     None        Body mass index is 22.14 kg/m².    Assessment/Plan   Noreen was seen today for colon cancer screening and irritable bowel syndrome.    Diagnoses and all orders for this visit:    Encounter for screening for malignant neoplasm of colon  -     Case Request; Standing  -     Follow Anesthesia Guidelines / Standing Orders; Future  -     Implement Anesthesia Orders Day of Procedure; Standing  -     Obtain Informed Consent; Standing  -     polyethylene glycol (GoLYTELY) 236 g solution; Take 3,785 mL by mouth 1 (One) Time for 1 dose. Take as directed  -     Case Request      COLONOSCOPY WITH ANESTHESIA (N/A)   Pt declined jane arellano miralax gatorade prep    Advised pt to stop ASA, use of NSAIDs, Fish Oil, and MV 5 days prior to procedure, per Dr Quintana protocol.  Tylenol based  products are ok to take.  Pt verbalized understanding.    All risks, benefits, alternatives, and indications of colonoscopy procedure have been discussed with the patient. Risks to include perforation of the colon requiring possible surgery or colostomy, risk of bleeding from biopsies or removal of colon tissue, possibility of missing a colon polyp or cancer, or adverse drug reaction.  Benefits to include the diagnosis and management of disease of the colon and rectum. Alternatives to include barium enema, radiographic evaluation, lab testing or no intervention. Pt verbalizes understanding and agrees.     Patient's Body mass index is 22.14 kg/m². BMI is above normal parameters. Recommendations include: no follow-up required.      There are no Patient Instructions on file for this visit.

## 2018-11-02 ENCOUNTER — TELEPHONE (OUTPATIENT)
Dept: GASTROENTEROLOGY | Facility: CLINIC | Age: 68
End: 2018-11-02

## 2018-11-02 NOTE — TELEPHONE ENCOUNTER
Miko would please confirm instructions for the patient.  Patient states she was told to take Mag Citrate, Miralax, and Ducolax. Is this correct?  She said it's because she is allergic to so many things.   I told her I would check with you first. Please advise. Thank you.    Note: Her cscope is for Monday 11/5/18

## 2018-11-16 ENCOUNTER — TELEPHONE (OUTPATIENT)
Dept: GASTROENTEROLOGY | Facility: CLINIC | Age: 68
End: 2018-11-16

## 2018-11-16 NOTE — TELEPHONE ENCOUNTER
patient called and stated she was unable to do the gallon prep and some how it got all confused. She said she was not having any problems and she would call back next year and schedule another colon.

## 2018-12-28 ENCOUNTER — HOSPITAL ENCOUNTER (OUTPATIENT)
Dept: GENERAL RADIOLOGY | Age: 68
Discharge: HOME | End: 2018-12-28
Payer: MEDICARE

## 2018-12-28 ENCOUNTER — OFFICE VISIT (OUTPATIENT)
Dept: FAMILY MEDICINE CLINIC | Age: 68
End: 2018-12-28
Payer: MEDICARE

## 2018-12-28 VITALS
DIASTOLIC BLOOD PRESSURE: 80 MMHG | TEMPERATURE: 97.8 F | SYSTOLIC BLOOD PRESSURE: 138 MMHG | HEART RATE: 74 BPM | WEIGHT: 118.4 LBS | OXYGEN SATURATION: 99 % | BODY MASS INDEX: 21.66 KG/M2

## 2018-12-28 DIAGNOSIS — M79.672 FOOT PAIN, LEFT: Primary | ICD-10-CM

## 2018-12-28 DIAGNOSIS — M79.672 FOOT PAIN, LEFT: ICD-10-CM

## 2018-12-28 PROCEDURE — 73630 X-RAY EXAM OF FOOT: CPT

## 2018-12-28 PROCEDURE — G8400 PT W/DXA NO RESULTS DOC: HCPCS | Performed by: CLINICAL NURSE SPECIALIST

## 2018-12-28 PROCEDURE — G8484 FLU IMMUNIZE NO ADMIN: HCPCS | Performed by: CLINICAL NURSE SPECIALIST

## 2018-12-28 PROCEDURE — 1101F PT FALLS ASSESS-DOCD LE1/YR: CPT | Performed by: CLINICAL NURSE SPECIALIST

## 2018-12-28 PROCEDURE — 4040F PNEUMOC VAC/ADMIN/RCVD: CPT | Performed by: CLINICAL NURSE SPECIALIST

## 2018-12-28 PROCEDURE — G8427 DOCREV CUR MEDS BY ELIG CLIN: HCPCS | Performed by: CLINICAL NURSE SPECIALIST

## 2018-12-28 PROCEDURE — G8420 CALC BMI NORM PARAMETERS: HCPCS | Performed by: CLINICAL NURSE SPECIALIST

## 2018-12-28 PROCEDURE — 3017F COLORECTAL CA SCREEN DOC REV: CPT | Performed by: CLINICAL NURSE SPECIALIST

## 2018-12-28 PROCEDURE — G8598 ASA/ANTIPLAT THER USED: HCPCS | Performed by: CLINICAL NURSE SPECIALIST

## 2018-12-28 PROCEDURE — 99213 OFFICE O/P EST LOW 20 MIN: CPT | Performed by: CLINICAL NURSE SPECIALIST

## 2018-12-28 PROCEDURE — 1036F TOBACCO NON-USER: CPT | Performed by: CLINICAL NURSE SPECIALIST

## 2018-12-28 PROCEDURE — 1090F PRES/ABSN URINE INCON ASSESS: CPT | Performed by: CLINICAL NURSE SPECIALIST

## 2018-12-28 PROCEDURE — 1123F ACP DISCUSS/DSCN MKR DOCD: CPT | Performed by: CLINICAL NURSE SPECIALIST

## 2018-12-28 RX ORDER — PREDNISONE 20 MG/1
60 TABLET ORAL DAILY
Qty: 15 TABLET | Refills: 0 | Status: SHIPPED | OUTPATIENT
Start: 2018-12-28 | End: 2019-01-02

## 2019-01-24 DIAGNOSIS — F41.9 ANXIETY: ICD-10-CM

## 2019-01-24 RX ORDER — ALPRAZOLAM 0.5 MG/1
0.5 TABLET ORAL 2 TIMES DAILY PRN
Qty: 60 TABLET | Refills: 2 | Status: SHIPPED | OUTPATIENT
Start: 2019-01-24 | End: 2019-02-23

## 2019-03-05 ENCOUNTER — OFFICE VISIT (OUTPATIENT)
Dept: FAMILY MEDICINE CLINIC | Age: 69
End: 2019-03-05
Payer: MEDICARE

## 2019-03-05 VITALS
WEIGHT: 115 LBS | SYSTOLIC BLOOD PRESSURE: 160 MMHG | HEART RATE: 80 BPM | TEMPERATURE: 97.9 F | OXYGEN SATURATION: 99 % | BODY MASS INDEX: 21.16 KG/M2 | DIASTOLIC BLOOD PRESSURE: 82 MMHG | HEIGHT: 62 IN

## 2019-03-05 DIAGNOSIS — I65.23 BILATERAL CAROTID ARTERY STENOSIS: ICD-10-CM

## 2019-03-05 DIAGNOSIS — F41.9 ANXIETY: ICD-10-CM

## 2019-03-05 DIAGNOSIS — R53.1 WEAKNESS: ICD-10-CM

## 2019-03-05 DIAGNOSIS — R53.83 FATIGUE, UNSPECIFIED TYPE: ICD-10-CM

## 2019-03-05 DIAGNOSIS — C90.00 MULTIPLE MYELOMA, REMISSION STATUS UNSPECIFIED (HCC): ICD-10-CM

## 2019-03-05 DIAGNOSIS — R03.0 ELEVATED BLOOD PRESSURE READING WITHOUT DIAGNOSIS OF HYPERTENSION: ICD-10-CM

## 2019-03-05 DIAGNOSIS — R53.83 FATIGUE, UNSPECIFIED TYPE: Primary | ICD-10-CM

## 2019-03-05 LAB
ALBUMIN SERPL-MCNC: 4.4 G/DL (ref 3.5–5.2)
ALP BLD-CCNC: 81 U/L (ref 35–104)
ALT SERPL-CCNC: 27 U/L (ref 5–33)
ANION GAP SERPL CALCULATED.3IONS-SCNC: 14 MMOL/L (ref 7–19)
AST SERPL-CCNC: 23 U/L (ref 5–32)
BASOPHILS ABSOLUTE: 0.1 K/UL (ref 0–0.2)
BASOPHILS RELATIVE PERCENT: 0.8 % (ref 0–1)
BILIRUB SERPL-MCNC: 0.5 MG/DL (ref 0.2–1.2)
BILIRUBIN URINE: NEGATIVE
BLOOD, URINE: NEGATIVE
BUN BLDV-MCNC: 11 MG/DL (ref 8–23)
CALCIUM SERPL-MCNC: 10.4 MG/DL (ref 8.8–10.2)
CHLORIDE BLD-SCNC: 100 MMOL/L (ref 98–111)
CLARITY: CLEAR
CO2: 26 MMOL/L (ref 22–29)
COLOR: YELLOW
CREAT SERPL-MCNC: 0.6 MG/DL (ref 0.5–0.9)
EOSINOPHILS ABSOLUTE: 0.1 K/UL (ref 0–0.6)
EOSINOPHILS RELATIVE PERCENT: 1.2 % (ref 0–5)
GFR NON-AFRICAN AMERICAN: >60
GLUCOSE BLD-MCNC: 103 MG/DL (ref 74–109)
GLUCOSE URINE: NEGATIVE MG/DL
HCT VFR BLD CALC: 41.6 % (ref 37–47)
HEMOGLOBIN: 13.9 G/DL (ref 12–16)
KETONES, URINE: NEGATIVE MG/DL
LEUKOCYTE ESTERASE, URINE: NEGATIVE
LYMPHOCYTES ABSOLUTE: 2.1 K/UL (ref 1.1–4.5)
LYMPHOCYTES RELATIVE PERCENT: 32.6 % (ref 20–40)
MCH RBC QN AUTO: 31.4 PG (ref 27–31)
MCHC RBC AUTO-ENTMCNC: 33.4 G/DL (ref 33–37)
MCV RBC AUTO: 93.9 FL (ref 81–99)
MONOCYTES ABSOLUTE: 0.4 K/UL (ref 0–0.9)
MONOCYTES RELATIVE PERCENT: 5.7 % (ref 0–10)
NEUTROPHILS ABSOLUTE: 3.9 K/UL (ref 1.5–7.5)
NEUTROPHILS RELATIVE PERCENT: 59.2 % (ref 50–65)
NITRITE, URINE: NEGATIVE
PDW BLD-RTO: 12 % (ref 11.5–14.5)
PH UA: 6 (ref 5–8)
PLATELET # BLD: 348 K/UL (ref 130–400)
PMV BLD AUTO: 11 FL (ref 9.4–12.3)
POTASSIUM SERPL-SCNC: 4.7 MMOL/L (ref 3.5–5)
PROTEIN UA: NEGATIVE MG/DL
RBC # BLD: 4.43 M/UL (ref 4.2–5.4)
SODIUM BLD-SCNC: 140 MMOL/L (ref 136–145)
SPECIFIC GRAVITY UA: 1.01 (ref 1–1.03)
T4 FREE: 1 NG/DL (ref 0.9–1.7)
TOTAL PROTEIN: 9 G/DL (ref 6.6–8.7)
TSH SERPL DL<=0.05 MIU/L-ACNC: 2.06 UIU/ML (ref 0.27–4.2)
UROBILINOGEN, URINE: 0.2 E.U./DL
WBC # BLD: 6.5 K/UL (ref 4.8–10.8)

## 2019-03-05 PROCEDURE — G8420 CALC BMI NORM PARAMETERS: HCPCS | Performed by: NURSE PRACTITIONER

## 2019-03-05 PROCEDURE — 4040F PNEUMOC VAC/ADMIN/RCVD: CPT | Performed by: NURSE PRACTITIONER

## 2019-03-05 PROCEDURE — G8598 ASA/ANTIPLAT THER USED: HCPCS | Performed by: NURSE PRACTITIONER

## 2019-03-05 PROCEDURE — 1036F TOBACCO NON-USER: CPT | Performed by: NURSE PRACTITIONER

## 2019-03-05 PROCEDURE — 3017F COLORECTAL CA SCREEN DOC REV: CPT | Performed by: NURSE PRACTITIONER

## 2019-03-05 PROCEDURE — 1123F ACP DISCUSS/DSCN MKR DOCD: CPT | Performed by: NURSE PRACTITIONER

## 2019-03-05 PROCEDURE — 99214 OFFICE O/P EST MOD 30 MIN: CPT | Performed by: NURSE PRACTITIONER

## 2019-03-05 PROCEDURE — 1101F PT FALLS ASSESS-DOCD LE1/YR: CPT | Performed by: NURSE PRACTITIONER

## 2019-03-05 PROCEDURE — 1090F PRES/ABSN URINE INCON ASSESS: CPT | Performed by: NURSE PRACTITIONER

## 2019-03-05 PROCEDURE — G8484 FLU IMMUNIZE NO ADMIN: HCPCS | Performed by: NURSE PRACTITIONER

## 2019-03-05 PROCEDURE — G8400 PT W/DXA NO RESULTS DOC: HCPCS | Performed by: NURSE PRACTITIONER

## 2019-03-05 PROCEDURE — G8427 DOCREV CUR MEDS BY ELIG CLIN: HCPCS | Performed by: NURSE PRACTITIONER

## 2019-03-05 ASSESSMENT — ENCOUNTER SYMPTOMS
ABDOMINAL PAIN: 0
NAUSEA: 0
WHEEZING: 0
DIARRHEA: 0
CHEST TIGHTNESS: 0
SORE THROAT: 0
COUGH: 0
SHORTNESS OF BREATH: 0

## 2019-03-05 ASSESSMENT — PATIENT HEALTH QUESTIONNAIRE - PHQ9
2. FEELING DOWN, DEPRESSED OR HOPELESS: 1
SUM OF ALL RESPONSES TO PHQ QUESTIONS 1-9: 1
1. LITTLE INTEREST OR PLEASURE IN DOING THINGS: 0
SUM OF ALL RESPONSES TO PHQ QUESTIONS 1-9: 1
SUM OF ALL RESPONSES TO PHQ9 QUESTIONS 1 & 2: 1

## 2019-03-06 ENCOUNTER — TELEPHONE (OUTPATIENT)
Dept: FAMILY MEDICINE CLINIC | Age: 69
End: 2019-03-06

## 2019-03-06 DIAGNOSIS — E83.52 HYPERCALCEMIA: Primary | ICD-10-CM

## 2019-03-06 DIAGNOSIS — R77.9 ELEVATED BLOOD PROTEIN: ICD-10-CM

## 2019-03-15 ENCOUNTER — TELEPHONE (OUTPATIENT)
Dept: FAMILY MEDICINE CLINIC | Age: 69
End: 2019-03-15

## 2019-05-28 RX ORDER — DICYCLOMINE HCL 20 MG
20 TABLET ORAL 3 TIMES DAILY PRN
Qty: 90 TABLET | Refills: 1 | Status: SHIPPED | OUTPATIENT
Start: 2019-05-28 | End: 2020-01-09 | Stop reason: CLARIF

## 2019-06-03 ENCOUNTER — HOSPITAL ENCOUNTER (OUTPATIENT)
Age: 69
Discharge: HOME | End: 2019-06-03
Payer: MEDICARE

## 2019-06-03 PROCEDURE — 85025 COMPLETE CBC W/AUTO DIFF WBC: CPT

## 2019-08-09 DIAGNOSIS — E83.52 HYPERCALCEMIA: ICD-10-CM

## 2019-08-09 LAB
ANION GAP SERPL CALCULATED.3IONS-SCNC: 13 MMOL/L (ref 7–19)
BUN BLDV-MCNC: 9 MG/DL (ref 8–23)
CALCIUM IONIZED: 1.3 MMOL/L (ref 1.12–1.32)
CALCIUM SERPL-MCNC: 10.3 MG/DL (ref 8.8–10.2)
CHLORIDE BLD-SCNC: 99 MMOL/L (ref 98–111)
CO2: 27 MMOL/L (ref 22–29)
CREAT SERPL-MCNC: 0.6 MG/DL (ref 0.5–0.9)
GFR NON-AFRICAN AMERICAN: >60
GLUCOSE BLD-MCNC: 94 MG/DL (ref 74–109)
PARATHYROID HORMONE INTACT: 30.3 PG/ML (ref 15–65)
POTASSIUM SERPL-SCNC: 4.3 MMOL/L (ref 3.5–5)
SODIUM BLD-SCNC: 139 MMOL/L (ref 136–145)

## 2019-08-12 VITALS
HEART RATE: 71 BPM | WEIGHT: 115 LBS | DIASTOLIC BLOOD PRESSURE: 82 MMHG | HEIGHT: 62 IN | SYSTOLIC BLOOD PRESSURE: 130 MMHG | BODY MASS INDEX: 21.16 KG/M2

## 2019-08-12 DIAGNOSIS — Z85.79 HISTORY OF MULTIPLE MYELOMA: ICD-10-CM

## 2019-08-15 ENCOUNTER — OFFICE VISIT (OUTPATIENT)
Dept: FAMILY MEDICINE CLINIC | Age: 69
End: 2019-08-15
Payer: MEDICARE

## 2019-08-15 VITALS
BODY MASS INDEX: 20.72 KG/M2 | DIASTOLIC BLOOD PRESSURE: 80 MMHG | TEMPERATURE: 99.4 F | WEIGHT: 112.6 LBS | OXYGEN SATURATION: 99 % | SYSTOLIC BLOOD PRESSURE: 136 MMHG | HEIGHT: 62 IN | HEART RATE: 55 BPM

## 2019-08-15 DIAGNOSIS — C90.00 MULTIPLE MYELOMA, REMISSION STATUS UNSPECIFIED (HCC): ICD-10-CM

## 2019-08-15 DIAGNOSIS — Z85.79 HISTORY OF MULTIPLE MYELOMA: ICD-10-CM

## 2019-08-15 DIAGNOSIS — Z00.00 MEDICARE ANNUAL WELLNESS VISIT, SUBSEQUENT: ICD-10-CM

## 2019-08-15 DIAGNOSIS — F41.9 ANXIETY: Primary | ICD-10-CM

## 2019-08-15 DIAGNOSIS — M81.0 AGE-RELATED OSTEOPOROSIS WITHOUT CURRENT PATHOLOGICAL FRACTURE: ICD-10-CM

## 2019-08-15 DIAGNOSIS — I65.23 BILATERAL CAROTID ARTERY STENOSIS: ICD-10-CM

## 2019-08-15 DIAGNOSIS — E78.2 MIXED HYPERLIPIDEMIA: ICD-10-CM

## 2019-08-15 DIAGNOSIS — R77.9 ELEVATED SERUM PROTEIN LEVEL: ICD-10-CM

## 2019-08-15 PROBLEM — J39.2 THROAT IRRITATION: Status: RESOLVED | Noted: 2017-08-04 | Resolved: 2019-08-15

## 2019-08-15 LAB
AMPHETAMINE SCREEN, URINE: NORMAL
BARBITURATE SCREEN, URINE: NORMAL
BENZODIAZEPINE SCREEN, URINE: POSITIVE
BUPRENORPHINE URINE: NORMAL
COCAINE METABOLITE SCREEN URINE: NORMAL
GABAPENTIN SCREEN, URINE: NORMAL
MDMA URINE: NORMAL
METHADONE SCREEN, URINE: NORMAL
METHAMPHETAMINE, URINE: NORMAL
OPIATE SCREEN URINE: NORMAL
OXYCODONE SCREEN URINE: NORMAL
PHENCYCLIDINE SCREEN URINE: NORMAL
PROPOXYPHENE SCREEN, URINE: NORMAL
THC SCREEN, URINE: NORMAL
TRICYCLIC ANTIDEPRESSANTS, UR: NORMAL

## 2019-08-15 PROCEDURE — G8420 CALC BMI NORM PARAMETERS: HCPCS | Performed by: FAMILY MEDICINE

## 2019-08-15 PROCEDURE — 3017F COLORECTAL CA SCREEN DOC REV: CPT | Performed by: FAMILY MEDICINE

## 2019-08-15 PROCEDURE — 99213 OFFICE O/P EST LOW 20 MIN: CPT | Performed by: FAMILY MEDICINE

## 2019-08-15 PROCEDURE — G0439 PPPS, SUBSEQ VISIT: HCPCS | Performed by: FAMILY MEDICINE

## 2019-08-15 PROCEDURE — 1123F ACP DISCUSS/DSCN MKR DOCD: CPT | Performed by: FAMILY MEDICINE

## 2019-08-15 PROCEDURE — 4040F PNEUMOC VAC/ADMIN/RCVD: CPT | Performed by: FAMILY MEDICINE

## 2019-08-15 PROCEDURE — G8427 DOCREV CUR MEDS BY ELIG CLIN: HCPCS | Performed by: FAMILY MEDICINE

## 2019-08-15 PROCEDURE — G8400 PT W/DXA NO RESULTS DOC: HCPCS | Performed by: FAMILY MEDICINE

## 2019-08-15 PROCEDURE — 1090F PRES/ABSN URINE INCON ASSESS: CPT | Performed by: FAMILY MEDICINE

## 2019-08-15 PROCEDURE — 80305 DRUG TEST PRSMV DIR OPT OBS: CPT | Performed by: FAMILY MEDICINE

## 2019-08-15 PROCEDURE — G8598 ASA/ANTIPLAT THER USED: HCPCS | Performed by: FAMILY MEDICINE

## 2019-08-15 PROCEDURE — 1036F TOBACCO NON-USER: CPT | Performed by: FAMILY MEDICINE

## 2019-08-15 RX ORDER — ALPRAZOLAM 0.5 MG/1
0.5 TABLET ORAL 2 TIMES DAILY PRN
Qty: 60 TABLET | Refills: 2 | Status: SHIPPED | OUTPATIENT
Start: 2019-08-15 | End: 2019-09-14

## 2019-08-15 RX ORDER — RALOXIFENE HYDROCHLORIDE 60 MG/1
60 TABLET, FILM COATED ORAL DAILY
Qty: 7 TABLET | Refills: 3 | Status: SHIPPED | OUTPATIENT
Start: 2019-08-15 | End: 2019-11-15

## 2019-08-15 ASSESSMENT — ENCOUNTER SYMPTOMS
SHORTNESS OF BREATH: 0
ABDOMINAL PAIN: 0
COUGH: 0
NAUSEA: 0
CONSTIPATION: 0
CHEST TIGHTNESS: 0
ANAL BLEEDING: 0
DIARRHEA: 0

## 2019-08-15 ASSESSMENT — PATIENT HEALTH QUESTIONNAIRE - PHQ9
SUM OF ALL RESPONSES TO PHQ QUESTIONS 1-9: 1
SUM OF ALL RESPONSES TO PHQ QUESTIONS 1-9: 1

## 2019-08-27 ENCOUNTER — HOSPITAL ENCOUNTER (OUTPATIENT)
Dept: INFUSION THERAPY | Age: 69
Discharge: HOME | End: 2019-08-27
Payer: MEDICARE

## 2019-08-27 DIAGNOSIS — C90.00 MULTIPLE MYELOMA, REMISSION STATUS UNSPECIFIED (HCC): Primary | ICD-10-CM

## 2019-08-27 DIAGNOSIS — C90.00 MULTIPLE MYELOMA, REMISSION STATUS UNSPECIFIED (HCC): ICD-10-CM

## 2019-09-03 NOTE — PROGRESS NOTES
urgency, no hematuria, no kidney stones;   Musculoskeletal: no joint pain, swelling , stiffness;   Endocrine: no polyuria, polydypsia, no cold or heat intolerence; Hematology/lymphatic: no easy brusing or bleeding, no hx of clotting disorder; no peripheral adenopathy. Dermatology: no skin rash, no eczema, no pruritis;   Psychiatry: no depression, no anxiety,no panic attacks, no suicide ideation; Neurology: no syncope, no seizures, no numbness or tingling of hands, no numbness or tingling of feet, no paresis;     PHYSICAL EXAM:    Vitals signs:  /70   Pulse 64   Ht 5' 2\" (1.575 m)   Wt 111 lb 6.4 oz (50.5 kg)   SpO2 99%   BMI 20.38 kg/m²    Pain scale:  Pain Score:   0 - No pain     CONSTITUTIONAL: Alert, appropriate, no acute distress,   EYES: Non icteric, EOM intact, pupils equal round and reactive to light and accommodation. ENT: Oral mucus membranes moist, no oral pharyngeal lesions. External inspection of ears and nose are normal.   NECK: Supple, no masses. No palpable thyroid mass    CHEST/LUNGS: CTA bilaterally, normal respiratory effort   CARDIOVASCULAR: RRR, no murmurs. No lower extremity edema   ABDOMEN: soft non-tender, active bowel sounds, no hepatosplenomegaly. No palpable masses. EXTREMITIES: warm, Full ROM of all fours extremities. No focal weakness. SKIN: warm, dry with no rashes or lesions  LYMPH: No cervical, clavicular, axillary, or inguinal lymphadenopathy  NEUROLOGIC: follows commands, non focal.   PSYCH: mood and affect appropriate. Alert and oriented to time and place and person. Relevant Lab findings/reviewed by me:    · 6/3/2019- SPEP showed M spike 2.2, IgG 2345  · 8/9/2019-PTH = 30 (WNL)  · 8/23/2019- bone survey was unremarkable and showed no lytic lesions. · 8/27/2019- SPEP showed M spike 2.0  · 9/4/2019- CMP today showed a normal creatinine 0.6, calcium 10.4 hemoglobin 14.1, WBC 5.8, platelets 684,852. No CRAB criteria.        Relevant Imaging studies/reviewed

## 2019-09-04 ENCOUNTER — HOSPITAL ENCOUNTER (OUTPATIENT)
Dept: INFUSION THERAPY | Age: 69
Discharge: HOME | End: 2019-09-04
Payer: MEDICARE

## 2019-09-04 ENCOUNTER — OFFICE VISIT (OUTPATIENT)
Dept: HEMATOLOGY | Age: 69
End: 2019-09-04
Payer: MEDICARE

## 2019-09-04 VITALS
BODY MASS INDEX: 20.5 KG/M2 | WEIGHT: 111.4 LBS | DIASTOLIC BLOOD PRESSURE: 70 MMHG | HEIGHT: 62 IN | OXYGEN SATURATION: 99 % | SYSTOLIC BLOOD PRESSURE: 110 MMHG | HEART RATE: 64 BPM

## 2019-09-04 DIAGNOSIS — D47.2 SMOLDERING MULTIPLE MYELOMA: ICD-10-CM

## 2019-09-04 DIAGNOSIS — C90.00 MULTIPLE MYELOMA, REMISSION STATUS UNSPECIFIED (HCC): Primary | ICD-10-CM

## 2019-09-04 PROCEDURE — 99214 OFFICE O/P EST MOD 30 MIN: CPT | Performed by: INTERNAL MEDICINE

## 2019-09-04 PROCEDURE — 36415 COLL VENOUS BLD VENIPUNCTURE: CPT

## 2019-09-04 PROCEDURE — 99211 OFF/OP EST MAY X REQ PHY/QHP: CPT

## 2019-09-04 PROCEDURE — 85025 COMPLETE CBC W/AUTO DIFF WBC: CPT

## 2019-09-04 PROCEDURE — 80053 COMPREHEN METABOLIC PANEL: CPT

## 2019-11-06 RX ORDER — TRAZODONE HYDROCHLORIDE 50 MG/1
50 TABLET ORAL NIGHTLY PRN
Qty: 30 TABLET | Refills: 5 | Status: SHIPPED | OUTPATIENT
Start: 2019-11-06 | End: 2020-01-09 | Stop reason: CLARIF

## 2019-11-15 ENCOUNTER — OFFICE VISIT (OUTPATIENT)
Dept: FAMILY MEDICINE CLINIC | Age: 69
End: 2019-11-15
Payer: MEDICARE

## 2019-11-15 VITALS
BODY MASS INDEX: 20.7 KG/M2 | WEIGHT: 113.2 LBS | SYSTOLIC BLOOD PRESSURE: 152 MMHG | HEART RATE: 72 BPM | DIASTOLIC BLOOD PRESSURE: 82 MMHG | TEMPERATURE: 97.4 F | OXYGEN SATURATION: 97 %

## 2019-11-15 DIAGNOSIS — M25.551 ACUTE RIGHT HIP PAIN: ICD-10-CM

## 2019-11-15 DIAGNOSIS — F41.9 ANXIETY: Primary | ICD-10-CM

## 2019-11-15 DIAGNOSIS — C90.00 MULTIPLE MYELOMA, REMISSION STATUS UNSPECIFIED (HCC): ICD-10-CM

## 2019-11-15 PROCEDURE — 4040F PNEUMOC VAC/ADMIN/RCVD: CPT | Performed by: CLINICAL NURSE SPECIALIST

## 2019-11-15 PROCEDURE — G8598 ASA/ANTIPLAT THER USED: HCPCS | Performed by: CLINICAL NURSE SPECIALIST

## 2019-11-15 PROCEDURE — 3017F COLORECTAL CA SCREEN DOC REV: CPT | Performed by: CLINICAL NURSE SPECIALIST

## 2019-11-15 PROCEDURE — G8427 DOCREV CUR MEDS BY ELIG CLIN: HCPCS | Performed by: CLINICAL NURSE SPECIALIST

## 2019-11-15 PROCEDURE — G8420 CALC BMI NORM PARAMETERS: HCPCS | Performed by: CLINICAL NURSE SPECIALIST

## 2019-11-15 PROCEDURE — 1123F ACP DISCUSS/DSCN MKR DOCD: CPT | Performed by: CLINICAL NURSE SPECIALIST

## 2019-11-15 PROCEDURE — 1090F PRES/ABSN URINE INCON ASSESS: CPT | Performed by: CLINICAL NURSE SPECIALIST

## 2019-11-15 PROCEDURE — 1036F TOBACCO NON-USER: CPT | Performed by: CLINICAL NURSE SPECIALIST

## 2019-11-15 PROCEDURE — G8484 FLU IMMUNIZE NO ADMIN: HCPCS | Performed by: CLINICAL NURSE SPECIALIST

## 2019-11-15 PROCEDURE — 99213 OFFICE O/P EST LOW 20 MIN: CPT | Performed by: CLINICAL NURSE SPECIALIST

## 2019-11-15 PROCEDURE — G8400 PT W/DXA NO RESULTS DOC: HCPCS | Performed by: CLINICAL NURSE SPECIALIST

## 2019-11-15 ASSESSMENT — ENCOUNTER SYMPTOMS
BACK PAIN: 0
NAUSEA: 0
CHEST TIGHTNESS: 0
ABDOMINAL PAIN: 0
SHORTNESS OF BREATH: 0
EYE PAIN: 0
VOMITING: 0
WHEEZING: 0
EYE DISCHARGE: 0
DIARRHEA: 1
TROUBLE SWALLOWING: 0
SINUS PRESSURE: 0
FACIAL SWELLING: 0
EYE REDNESS: 0
CONSTIPATION: 0
COUGH: 0
COLOR CHANGE: 0
SORE THROAT: 0

## 2020-01-02 ENCOUNTER — HOSPITAL ENCOUNTER (OUTPATIENT)
Dept: INFUSION THERAPY | Age: 70
Discharge: HOME | End: 2020-01-02
Payer: MEDICARE

## 2020-01-02 DIAGNOSIS — D47.2 SMOLDERING MULTIPLE MYELOMA: ICD-10-CM

## 2020-01-02 DIAGNOSIS — C90.00 MULTIPLE MYELOMA, REMISSION STATUS UNSPECIFIED (HCC): ICD-10-CM

## 2020-01-08 NOTE — PROGRESS NOTES
Daniella Angel   1950 1/9/2020     Chief Complaint   Patient presents with    Multiple Myeloma     Multiple myeloma, remission status unspecified         INTERVAL HISTORY/HISTORY OF PRESENT ILLNESS:    Diagnosis  · Smoldering multiple myeloma, November 2016  · IgG kappa restricted    Treatment summary  · Close laboratory/clinical surveillance    Interval history  Ms Dee Dee Gutierrez is a pleasant 81years old  female with smoldering multiple myeloma followed by Dr. Radha Marinelli who was last seen in the office on 11/23/2016. She was seen previously by Delaney Fairchild in June 2019. Further myeloma studies were requested and she is here today for the follow-up. She has no new complaints. She denies any back pain. She also had a bone survey performed. 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 Rufina Ordaz for evaluation of a monoclonal protein finding. She was seen by her primary care for a annual visit in July 2016 and was found to have a elevated protein at 9.3 and mildly elevated calcium levels at 10.7. This prompted a workup with serum protein electrophoresis which showed a M spike of 2, also confirmed by us on 8/4/2016 . K/L light chains ratio 4.08. IgG elevated at 2258 mg/dl. IgA low at 67 mg/dl (immuneparesis). Beta-2 microglobulin was 1.5. She complained of fatigue but has never had any fever no chills no night history of repeated infections, no weight loss. She had occasional back pain but denies any other bone pain. She also complains of occasional crampy abdominal pain due to her inflammatory bowel syndrome. She has no other major medical problems except for anxiety. PET/CT scan 8/19/2016-unremarkable. No evidence of bone metastasis or myelomatous involvement.   Therefore, she is diagnosed with smoldering multiple myeloma, M spike 2 (IgG isotype, immunoparesis IgM, Bence-Abad proteinuria (225 mg/dl). Bone marrow biopsy showed involvement by 10-12% plasma cells. No CRAB criteria met for myeloma. JOSIE.D. visit 2016- she is doing well without no new complaints. MM studies performed on 2016 compared with 2016  K/L ratio = 4.08->7.04  Urine ptn- 229-> 346 mg  M-spike- 2.0->2. · 6/3/2019- SPEP showed M spike 2.2, IgG 2345  · 2019-PTH = 30 (WNL)  · 2019- bone survey was unremarkable and showed no lytic lesions. · 2019- SPEP showed M spike 2.0  · 2019- CMP today showed a normal creatinine 0.6, calcium 10.4 hemoglobin 14.1, WBC 5.8, platelets 313,569. No CRAB criteria. Free kappa light chain 74, lambda light chain 9.2, kappa/lambda ratio = 8.13  · 2020- MM labs showed IgG 2707, IgM 44, kappa light chains = 76, lambda light chain = 6.6, kappa/lambda = 11.6. M -spike 2.2    ECO      PAST MEDICAL HISTORY:   Past Medical History:   Diagnosis Date    Anxiety     Elevated blood pressure reading     Elevated serum protein level     GERD (gastroesophageal reflux disease)     History of multiple myeloma     Hypercalcemia     Hyperlipidemia     Monoclonal gammopathy           PAST SURGICAL HISTORY:  Past Surgical History:   Procedure Laterality Date    HYSTERECTOMY      TIBIA FRACTURE SURGERY Left     with pradeep in lower left leg        SOCIAL HISTORY:  Social History     Socioeconomic History    Marital status:      Spouse name: Not on file    Number of children: Not on file    Years of education: Not on file    Highest education level: Not on file   Occupational History    Not on file   Social Needs    Financial resource strain: Not on file    Food insecurity:     Worry: Not on file     Inability: Not on file    Transportation needs:     Medical: Not on file     Non-medical: Not on file   Tobacco Use    Smoking status: Never Smoker    Smokeless tobacco: Never Used   Substance and Sexual Activity    Alcohol use: No    Drug use:  No

## 2020-01-09 ENCOUNTER — HOSPITAL ENCOUNTER (OUTPATIENT)
Dept: INFUSION THERAPY | Age: 70
Discharge: HOME | End: 2020-01-09
Payer: MEDICARE

## 2020-01-09 ENCOUNTER — OFFICE VISIT (OUTPATIENT)
Dept: HEMATOLOGY | Age: 70
End: 2020-01-09
Payer: MEDICARE

## 2020-01-09 VITALS
BODY MASS INDEX: 20.68 KG/M2 | OXYGEN SATURATION: 99 % | SYSTOLIC BLOOD PRESSURE: 126 MMHG | DIASTOLIC BLOOD PRESSURE: 76 MMHG | WEIGHT: 112.4 LBS | HEIGHT: 62 IN | HEART RATE: 70 BPM

## 2020-01-09 PROCEDURE — G8400 PT W/DXA NO RESULTS DOC: HCPCS | Performed by: INTERNAL MEDICINE

## 2020-01-09 PROCEDURE — 1036F TOBACCO NON-USER: CPT | Performed by: INTERNAL MEDICINE

## 2020-01-09 PROCEDURE — 4040F PNEUMOC VAC/ADMIN/RCVD: CPT | Performed by: INTERNAL MEDICINE

## 2020-01-09 PROCEDURE — G8420 CALC BMI NORM PARAMETERS: HCPCS | Performed by: INTERNAL MEDICINE

## 2020-01-09 PROCEDURE — 99211 OFF/OP EST MAY X REQ PHY/QHP: CPT

## 2020-01-09 PROCEDURE — G8427 DOCREV CUR MEDS BY ELIG CLIN: HCPCS | Performed by: INTERNAL MEDICINE

## 2020-01-09 PROCEDURE — 99213 OFFICE O/P EST LOW 20 MIN: CPT | Performed by: INTERNAL MEDICINE

## 2020-01-09 PROCEDURE — G8484 FLU IMMUNIZE NO ADMIN: HCPCS | Performed by: INTERNAL MEDICINE

## 2020-01-09 PROCEDURE — 1090F PRES/ABSN URINE INCON ASSESS: CPT | Performed by: INTERNAL MEDICINE

## 2020-01-09 PROCEDURE — 1123F ACP DISCUSS/DSCN MKR DOCD: CPT | Performed by: INTERNAL MEDICINE

## 2020-01-09 PROCEDURE — 3017F COLORECTAL CA SCREEN DOC REV: CPT | Performed by: INTERNAL MEDICINE

## 2020-01-09 PROCEDURE — 85025 COMPLETE CBC W/AUTO DIFF WBC: CPT

## 2020-01-09 RX ORDER — ALPRAZOLAM 0.5 MG/1
0.5 TABLET ORAL NIGHTLY PRN
COMMUNITY
End: 2020-10-16

## 2020-02-07 ENCOUNTER — OFFICE VISIT (OUTPATIENT)
Dept: FAMILY MEDICINE CLINIC | Age: 70
End: 2020-02-07
Payer: MEDICARE

## 2020-02-07 VITALS
SYSTOLIC BLOOD PRESSURE: 138 MMHG | RESPIRATION RATE: 18 BRPM | HEIGHT: 62 IN | TEMPERATURE: 97.1 F | OXYGEN SATURATION: 97 % | HEART RATE: 78 BPM | BODY MASS INDEX: 20.98 KG/M2 | WEIGHT: 114 LBS | DIASTOLIC BLOOD PRESSURE: 80 MMHG

## 2020-02-07 PROCEDURE — 3017F COLORECTAL CA SCREEN DOC REV: CPT | Performed by: NURSE PRACTITIONER

## 2020-02-07 PROCEDURE — 1090F PRES/ABSN URINE INCON ASSESS: CPT | Performed by: NURSE PRACTITIONER

## 2020-02-07 PROCEDURE — G8420 CALC BMI NORM PARAMETERS: HCPCS | Performed by: NURSE PRACTITIONER

## 2020-02-07 PROCEDURE — 1036F TOBACCO NON-USER: CPT | Performed by: NURSE PRACTITIONER

## 2020-02-07 PROCEDURE — G8484 FLU IMMUNIZE NO ADMIN: HCPCS | Performed by: NURSE PRACTITIONER

## 2020-02-07 PROCEDURE — 99214 OFFICE O/P EST MOD 30 MIN: CPT | Performed by: NURSE PRACTITIONER

## 2020-02-07 PROCEDURE — G8400 PT W/DXA NO RESULTS DOC: HCPCS | Performed by: NURSE PRACTITIONER

## 2020-02-07 PROCEDURE — G8427 DOCREV CUR MEDS BY ELIG CLIN: HCPCS | Performed by: NURSE PRACTITIONER

## 2020-02-07 PROCEDURE — 4040F PNEUMOC VAC/ADMIN/RCVD: CPT | Performed by: NURSE PRACTITIONER

## 2020-02-07 PROCEDURE — 1123F ACP DISCUSS/DSCN MKR DOCD: CPT | Performed by: NURSE PRACTITIONER

## 2020-02-07 RX ORDER — DOCUSATE SODIUM 100 MG/1
100 CAPSULE, LIQUID FILLED ORAL
COMMUNITY

## 2020-02-07 ASSESSMENT — ENCOUNTER SYMPTOMS
SHORTNESS OF BREATH: 0
ABDOMINAL PAIN: 0
NAUSEA: 0
COUGH: 0
SORE THROAT: 0
CHEST TIGHTNESS: 0
WHEEZING: 0
DIARRHEA: 0
VOMITING: 0

## 2020-02-07 NOTE — PROGRESS NOTES
Ridge Ryan is a 81 y.o. female who presents today for  Chief Complaint   Patient presents with    Other     \"noticed a tear around the anus\"        HPI:  She had a vaginal irritation approximately 1 week ago, noticed a small tear near the posterior vaginal opening. She started using vitamin E around the area and has improved, no further irritation. She was concerned about possible bladder prolapse but she has had no urinary symptoms that are unusual.  No longer having any vaginal issues. She has had past hysterectomy. No unusual vaginal bleeding. A few days ago she noticed bright red blood, small amount on toilet tissue, rectal.  She has issues with chronic constipation. She takes Colace daily. She thought she noticed a tear around the anal region. She has had no significant bleeding since. No recent fever or chills. No diarrhea or bowel changes otherwise. Review of Systems   Constitutional: Negative for chills and fever. HENT: Negative for congestion, ear pain and sore throat. Respiratory: Negative for cough, chest tightness, shortness of breath and wheezing. Cardiovascular: Negative for chest pain. Gastrointestinal: Negative for abdominal pain, diarrhea, nausea and vomiting. Genitourinary: Positive for vaginal pain (resolved). Negative for dysuria. Rectal tear, bleeding, see HPI   Musculoskeletal: Negative for arthralgias and myalgias. Skin: Negative for rash. Past Medical History:   Diagnosis Date    Anxiety     Elevated blood pressure reading     Elevated serum protein level     GERD (gastroesophageal reflux disease)     History of multiple myeloma     Hypercalcemia     Hyperlipidemia     Monoclonal gammopathy        Current Outpatient Medications   Medication Sig Dispense Refill    docusate sodium (COLACE) 100 MG capsule Take 100 mg by mouth      ALPRAZolam (XANAX) 0.5 MG tablet Take 0.5 mg by mouth nightly as needed for Sleep.       vitamin D sounds: Normal heart sounds. No murmur. Pulmonary:      Effort: Pulmonary effort is normal. No respiratory distress. Breath sounds: Normal breath sounds. No wheezing or rhonchi. Genitourinary:     General: Normal vulva. Comments: Vaginal inspection revealed no bladder prolapse, no discharge or tear to vaginal opening. Rectal exam with small external hemorrhoids, small skin anal tag. Small area of irritation noted to anus at 7 o'clock region. ERASMO with small internal hemorrhoid, stool hemoccult positive. Musculoskeletal: Normal range of motion. Lymphadenopathy:      Cervical: No cervical adenopathy. Skin:     General: Skin is warm and dry. Findings: No rash. Neurological:      Mental Status: She is alert. ASSESSMENT/PLAN:  1. Hemorrhoids, unspecified hemorrhoid type  - Internal hemorrhoids, small external hemorrhoids noted, no thrombosed hemorrhoids.  - Advised to continue Colace, and MiraLAX for constipation daily.  - Discussed prescription for hydrocortisone suppository, she is not really having rectal pain at this time. She will continue application of vitamin E and may use OTC Preparation H as needed. She will report any acute worsening. 2. Constipation, unspecified constipation type  - MiraLAX and Colace as above    3. Vaginal irritation  - Resolved, continue to monitor. No bladder prolapse noted on exam.  Advised to report any acute worsening. Follow-up as scheduled, sooner with problems    Greater than 50% of total appointment time 25 minutes spent counseling, addressing acute issues, coordination of care. No follow-ups on file. Nitin Helton was seen today for other. Diagnoses and all orders for this visit:    Hemorrhoids, unspecified hemorrhoid type    Constipation, unspecified constipation type    Vaginal irritation      There are no discontinued medications. There are no Patient Instructions on file for this visit.     Patient voicesunderstanding and agrees to plans along with risks and benefits of plan. Counseling:  Jayrobhupendra Shi Penny's case, medications and options were discussed in detail. Patient was instructed to call the office if she questionsregarding her treatment. Should her conditions worsen, she should return to office to be reassessed by FIORELLA De La Fuente. she Should to go the closest Emergency Department for any emergency. They verbalizedunderstanding the above instructions. No follow-ups on file.

## 2020-02-21 ENCOUNTER — OFFICE VISIT (OUTPATIENT)
Dept: FAMILY MEDICINE CLINIC | Age: 70
End: 2020-02-21
Payer: MEDICARE

## 2020-02-21 VITALS
BODY MASS INDEX: 21.16 KG/M2 | TEMPERATURE: 98.2 F | SYSTOLIC BLOOD PRESSURE: 162 MMHG | DIASTOLIC BLOOD PRESSURE: 70 MMHG | OXYGEN SATURATION: 97 % | WEIGHT: 115 LBS | HEIGHT: 62 IN | HEART RATE: 80 BPM

## 2020-02-21 PROCEDURE — G8420 CALC BMI NORM PARAMETERS: HCPCS | Performed by: CLINICAL NURSE SPECIALIST

## 2020-02-21 PROCEDURE — 4040F PNEUMOC VAC/ADMIN/RCVD: CPT | Performed by: CLINICAL NURSE SPECIALIST

## 2020-02-21 PROCEDURE — G8400 PT W/DXA NO RESULTS DOC: HCPCS | Performed by: CLINICAL NURSE SPECIALIST

## 2020-02-21 PROCEDURE — 1090F PRES/ABSN URINE INCON ASSESS: CPT | Performed by: CLINICAL NURSE SPECIALIST

## 2020-02-21 PROCEDURE — 1123F ACP DISCUSS/DSCN MKR DOCD: CPT | Performed by: CLINICAL NURSE SPECIALIST

## 2020-02-21 PROCEDURE — 99213 OFFICE O/P EST LOW 20 MIN: CPT | Performed by: CLINICAL NURSE SPECIALIST

## 2020-02-21 PROCEDURE — 1036F TOBACCO NON-USER: CPT | Performed by: CLINICAL NURSE SPECIALIST

## 2020-02-21 PROCEDURE — 3017F COLORECTAL CA SCREEN DOC REV: CPT | Performed by: CLINICAL NURSE SPECIALIST

## 2020-02-21 PROCEDURE — G8427 DOCREV CUR MEDS BY ELIG CLIN: HCPCS | Performed by: CLINICAL NURSE SPECIALIST

## 2020-02-21 PROCEDURE — G8484 FLU IMMUNIZE NO ADMIN: HCPCS | Performed by: CLINICAL NURSE SPECIALIST

## 2020-02-21 ASSESSMENT — ENCOUNTER SYMPTOMS
SHORTNESS OF BREATH: 0
COUGH: 0
CHEST TIGHTNESS: 0

## 2020-02-21 NOTE — PROGRESS NOTES
Palpitations    Lortab [Hydrocodone-Acetaminophen] Nausea Only and Nausea And Vomiting    Penicillins Nausea Only and Nausea And Vomiting     All \"Cillins\"       Review of Systems   Constitutional: Negative for appetite change, chills, fatigue and fever. Eyes: Negative for visual disturbance. Respiratory: Negative for cough, chest tightness and shortness of breath. Cardiovascular: Negative for chest pain and palpitations. Musculoskeletal: Positive for arthralgias. Neurological: Negative for dizziness, weakness and headaches. Psychiatric/Behavioral: Positive for sleep disturbance. Negative for confusion and dysphoric mood. The patient is nervous/anxious. OBJECTIVE:  BP (!) 162/70   Pulse 80   Temp 98.2 °F (36.8 °C)   Ht 5' 2\" (1.575 m)   Wt 115 lb (52.2 kg)   SpO2 97%   BMI 21.03 kg/m²    Physical Exam  Constitutional:       General: She is not in acute distress. Appearance: She is normal weight. She is not ill-appearing. HENT:      Head: Normocephalic and atraumatic. Eyes:      Conjunctiva/sclera: Conjunctivae normal.      Pupils: Pupils are equal, round, and reactive to light. Neck:      Musculoskeletal: Normal range of motion and neck supple. Cardiovascular:      Rate and Rhythm: Normal rate and regular rhythm. Heart sounds: No murmur. Pulmonary:      Effort: Pulmonary effort is normal. No respiratory distress. Breath sounds: Normal breath sounds. Musculoskeletal: Normal range of motion. General: No swelling, tenderness or deformity. Skin:     General: Skin is warm and dry. Findings: No rash. Neurological:      General: No focal deficit present. Mental Status: She is alert and oriented to person, place, and time. Mental status is at baseline. Psychiatric:         Mood and Affect: Affect normal. Mood is anxious.          Speech: Speech normal.         Behavior: Behavior normal.         Cognition and Memory: Cognition and memory normal.

## 2020-05-07 ENCOUNTER — HOSPITAL ENCOUNTER (OUTPATIENT)
Dept: INFUSION THERAPY | Age: 70
End: 2020-05-07

## 2020-05-11 ENCOUNTER — HOSPITAL ENCOUNTER (OUTPATIENT)
Dept: INFUSION THERAPY | Age: 70
Discharge: HOME | End: 2020-05-11
Payer: MEDICARE

## 2020-05-11 DIAGNOSIS — D47.2 SMOLDERING MULTIPLE MYELOMA: ICD-10-CM

## 2020-05-11 LAB
ALBUMIN SERPL-MCNC: 4.3 G/DL (ref 3.5–5.2)
ALP BLD-CCNC: 93 U/L (ref 35–104)
ALT SERPL-CCNC: 30 U/L (ref 9–52)
ANION GAP SERPL CALCULATED.3IONS-SCNC: 10 MMOL/L (ref 7–19)
AST SERPL-CCNC: 28 U/L (ref 14–36)
BILIRUB SERPL-MCNC: 0.5 MG/DL (ref 0.2–1.3)
BUN BLDV-MCNC: 16 MG/DL (ref 7–17)
CALCIUM SERPL-MCNC: 10.7 MG/DL (ref 8.4–10.2)
CHLORIDE BLD-SCNC: 99 MMOL/L (ref 98–111)
CO2: 30 MMOL/L (ref 22–29)
CREAT SERPL-MCNC: 0.8 MG/DL (ref 0.5–1)
GFR NON-AFRICAN AMERICAN: >60
GLOBULIN: 4.3 G/DL
GLUCOSE BLD-MCNC: 114 MG/DL (ref 74–106)
POTASSIUM SERPL-SCNC: 4.6 MMOL/L (ref 3.5–5.1)
SODIUM BLD-SCNC: 139 MMOL/L (ref 137–145)
TOTAL PROTEIN: 8.6 G/DL (ref 6.3–8.2)

## 2020-05-11 PROCEDURE — 80053 COMPREHEN METABOLIC PANEL: CPT

## 2020-05-15 NOTE — PROGRESS NOTES
Substance and Sexual Activity    Alcohol use: No    Drug use: No    Sexual activity: Not on file   Lifestyle    Physical activity     Days per week: Not on file     Minutes per session: Not on file    Stress: Not on file   Relationships    Social connections     Talks on phone: Not on file     Gets together: Not on file     Attends [employer] service: Not on file     Active member of club or organization: Not on file     Attends meetings of clubs or organizations: Not on file     Relationship status: Not on file    Intimate partner violence     Fear of current or ex partner: Not on file     Emotionally abused: Not on file     Physically abused: Not on file     Forced sexual activity: Not on file   Other Topics Concern    Not on file   Social History Narrative    Not on file        FAMILY HISTORY:  Family History   Problem Relation Age of Onset    High Blood Pressure Mother     Cancer Father     High Cholesterol Brother         Current Outpatient Medications   Medication Sig Dispense Refill    docusate sodium (COLACE) 100 MG capsule Take 100 mg by mouth      ALPRAZolam (XANAX) 0.5 MG tablet Take 0.5 mg by mouth nightly as needed for Sleep.  vitamin D (CHOLECALCIFEROL) 1000 UNIT TABS tablet Take 2,000 Units by mouth daily       Ascorbic Acid (VITAMIN C) 500 MG CAPS Take 1 tablet by mouth daily.  aspirin 81 MG tablet Take 81 mg by mouth daily.  melatonin 3 MG TABS tablet Take 3 mg by mouth nightly.  L-LYSINE PO Take 1 tablet by mouth daily. No current facility-administered medications for this visit.          REVIEW OF SYSTEMS:    Constitutional: no fever, no night sweats, fatigue;   HEENT: no blurring of vision, no double vision, no hearing difficulty, no tinnitus,no ulceration, , no dysphagia  Lungs: no cough, no shortness of breath, no wheeze;   CVS: no palpitation, no chest pain, no shortness of breath;  GI: no abdominal pain, no nausea , no vomiting, no constipation;   VIC:

## 2020-05-18 ENCOUNTER — TELEPHONE (OUTPATIENT)
Dept: HEMATOLOGY | Age: 70
End: 2020-05-18

## 2020-05-18 ENCOUNTER — VIRTUAL VISIT (OUTPATIENT)
Dept: HEMATOLOGY | Age: 70
End: 2020-05-18
Payer: MEDICARE

## 2020-05-18 VITALS
DIASTOLIC BLOOD PRESSURE: 80 MMHG | SYSTOLIC BLOOD PRESSURE: 138 MMHG | BODY MASS INDEX: 20.98 KG/M2 | HEART RATE: 77 BPM | WEIGHT: 114 LBS | HEIGHT: 62 IN

## 2020-05-18 PROCEDURE — 99442 PR PHYS/QHP TELEPHONE EVALUATION 11-20 MIN: CPT | Performed by: INTERNAL MEDICINE

## 2020-08-17 DIAGNOSIS — E78.2 MIXED HYPERLIPIDEMIA: ICD-10-CM

## 2020-08-17 LAB
ALBUMIN SERPL-MCNC: 4.2 G/DL (ref 3.5–5.2)
ALP BLD-CCNC: 71 U/L (ref 35–104)
ALT SERPL-CCNC: 19 U/L (ref 5–33)
ANION GAP SERPL CALCULATED.3IONS-SCNC: 11 MMOL/L (ref 7–19)
AST SERPL-CCNC: 18 U/L (ref 5–32)
BASOPHILS ABSOLUTE: 0.1 K/UL (ref 0–0.2)
BASOPHILS RELATIVE PERCENT: 1 % (ref 0–1)
BILIRUB SERPL-MCNC: 0.4 MG/DL (ref 0.2–1.2)
BUN BLDV-MCNC: 12 MG/DL (ref 8–23)
CALCIUM SERPL-MCNC: 10.3 MG/DL (ref 8.8–10.2)
CHLORIDE BLD-SCNC: 100 MMOL/L (ref 98–111)
CHOLESTEROL, TOTAL: 201 MG/DL (ref 160–199)
CO2: 28 MMOL/L (ref 22–29)
CREAT SERPL-MCNC: 0.6 MG/DL (ref 0.5–0.9)
EOSINOPHILS ABSOLUTE: 0.2 K/UL (ref 0–0.6)
EOSINOPHILS RELATIVE PERCENT: 2.5 % (ref 0–5)
GFR AFRICAN AMERICAN: >59
GFR NON-AFRICAN AMERICAN: >60
GLUCOSE BLD-MCNC: 96 MG/DL (ref 74–109)
HCT VFR BLD CALC: 40.4 % (ref 37–47)
HDLC SERPL-MCNC: 82 MG/DL (ref 65–121)
HEMOGLOBIN: 13.4 G/DL (ref 12–16)
IMMATURE GRANULOCYTES #: 0 K/UL
LDL CHOLESTEROL CALCULATED: 108 MG/DL
LYMPHOCYTES ABSOLUTE: 2.4 K/UL (ref 1.1–4.5)
LYMPHOCYTES RELATIVE PERCENT: 39.5 % (ref 20–40)
MCH RBC QN AUTO: 31.8 PG (ref 27–31)
MCHC RBC AUTO-ENTMCNC: 33.2 G/DL (ref 33–37)
MCV RBC AUTO: 96 FL (ref 81–99)
MONOCYTES ABSOLUTE: 0.5 K/UL (ref 0–0.9)
MONOCYTES RELATIVE PERCENT: 7.8 % (ref 0–10)
NEUTROPHILS ABSOLUTE: 3 K/UL (ref 1.5–7.5)
NEUTROPHILS RELATIVE PERCENT: 49 % (ref 50–65)
PDW BLD-RTO: 12.2 % (ref 11.5–14.5)
PLATELET # BLD: 334 K/UL (ref 130–400)
PMV BLD AUTO: 10.3 FL (ref 9.4–12.3)
POTASSIUM SERPL-SCNC: 4.6 MMOL/L (ref 3.5–5)
RBC # BLD: 4.21 M/UL (ref 4.2–5.4)
SODIUM BLD-SCNC: 139 MMOL/L (ref 136–145)
TOTAL PROTEIN: 8.2 G/DL (ref 6.6–8.7)
TRIGL SERPL-MCNC: 56 MG/DL (ref 0–149)
WBC # BLD: 6 K/UL (ref 4.8–10.8)

## 2020-08-24 ENCOUNTER — VIRTUAL VISIT (OUTPATIENT)
Dept: FAMILY MEDICINE CLINIC | Age: 70
End: 2020-08-24
Payer: MEDICARE

## 2020-08-24 ENCOUNTER — VIRTUAL VISIT (OUTPATIENT)
Dept: FAMILY MEDICINE CLINIC | Age: 70
End: 2020-08-24

## 2020-08-24 VITALS
SYSTOLIC BLOOD PRESSURE: 163 MMHG | HEART RATE: 100 BPM | DIASTOLIC BLOOD PRESSURE: 80 MMHG | TEMPERATURE: 97 F | HEIGHT: 62 IN | WEIGHT: 114 LBS | BODY MASS INDEX: 20.98 KG/M2 | OXYGEN SATURATION: 98 %

## 2020-08-24 PROCEDURE — 99441 PR PHYS/QHP TELEPHONE EVALUATION 5-10 MIN: CPT | Performed by: FAMILY MEDICINE

## 2020-08-24 PROCEDURE — G0439 PPPS, SUBSEQ VISIT: HCPCS | Performed by: FAMILY MEDICINE

## 2020-08-24 PROCEDURE — 4040F PNEUMOC VAC/ADMIN/RCVD: CPT | Performed by: FAMILY MEDICINE

## 2020-08-24 PROCEDURE — 1123F ACP DISCUSS/DSCN MKR DOCD: CPT | Performed by: FAMILY MEDICINE

## 2020-08-24 PROCEDURE — 3017F COLORECTAL CA SCREEN DOC REV: CPT | Performed by: FAMILY MEDICINE

## 2020-08-24 ASSESSMENT — LIFESTYLE VARIABLES: HOW OFTEN DO YOU HAVE A DRINK CONTAINING ALCOHOL: 0

## 2020-08-24 ASSESSMENT — PATIENT HEALTH QUESTIONNAIRE - PHQ9
SUM OF ALL RESPONSES TO PHQ QUESTIONS 1-9: 0
SUM OF ALL RESPONSES TO PHQ QUESTIONS 1-9: 0

## 2020-08-24 NOTE — PROGRESS NOTES
[Hydrocodone-Acetaminophen] Nausea Only and Nausea And Vomiting    Penicillins Nausea Only and Nausea And Vomiting     All \"Cillins\"         Prior to Visit Medications    Medication Sig Taking? Authorizing Provider   docusate sodium (COLACE) 100 MG capsule Take 100 mg by mouth  Historical Provider, MD   ALPRAZolam (XANAX) 0.5 MG tablet Take 0.5 mg by mouth nightly as needed for Sleep. Historical Provider, MD   vitamin D (CHOLECALCIFEROL) 1000 UNIT TABS tablet Take 2,000 Units by mouth daily   Historical Provider, MD   Ascorbic Acid (VITAMIN C) 500 MG CAPS Take 1 tablet by mouth daily. Historical Provider, MD   aspirin 81 MG tablet Take 81 mg by mouth daily. Historical Provider, MD   melatonin 3 MG TABS tablet Take 3 mg by mouth nightly. Historical Provider, MD   L-LYSINE PO Take 1 tablet by mouth daily. Historical Provider, MD         Past Medical History:   Diagnosis Date    Anxiety     Elevated blood pressure reading     Elevated serum protein level     GERD (gastroesophageal reflux disease)     History of multiple myeloma     Hypercalcemia     Hyperlipidemia     Monoclonal gammopathy        Past Surgical History:   Procedure Laterality Date    HYSTERECTOMY      TIBIA FRACTURE SURGERY Left     with pradeep in lower left leg         Family History   Problem Relation Age of Onset    High Blood Pressure Mother     Cancer Father     High Cholesterol Brother        CareTeam (Including outside providers/suppliers regularly involved in providing care):   Patient Care Team:  Eagle Roe MD as PCP - General (Family Medicine)  Eagle Roe MD as PCP - [address] [place] [address]  [facility] Provider  Madyson Brown MD as Consulting Physician (Oncology)    Wt Readings from Last 3 Encounters:   08/24/20 114 lb (51.7 kg)   05/18/20 114 lb (51.7 kg)   02/21/20 115 lb (52.2 kg)      No flowsheet data found. Body mass index is 20.85 kg/m².     Based upon direct observation of the patient, evaluation of cognition reveals recent and remote memory intact. Patient's complete Health Risk Assessment and screening values have been reviewed and are found in Flowsheets. The following problems were reviewed today and where indicated follow up appointments were made and/or referrals ordered. Positive Risk Factor Screenings with Interventions:     No Positive Risk Factors identified today. Personalized Preventive Plan   Current Health Maintenance Status  Immunization History   Administered Date(s) Administered    Pneumococcal Conjugate 13-valent ([id number]) 08/13/2018    Pneumococcal Polysaccharide ([id number]) 08/27/2020    Tdap (Boostrix, Adacel) 07/06/2011        Health Maintenance   Topic Date Due    Hepatitis C screen  1950    Shingles Vaccine (1 of 2) 07/22/2002    Annual Wellness Visit (AWV)  05/29/2019    Flu vaccine (1) 09/01/2020    Breast cancer screen  08/30/2020    DTaP/Tdap/Td vaccine (2 - Td) 07/06/2021    Colon cancer screen fecal DNA test (Cologuard)  09/10/2022    Lipid screen  08/17/2025    DEXA (modify frequency per FRAX score)  Completed    Pneumococcal 65+ yrs at Risk Vaccine  Completed    Hepatitis A vaccine  Aged Out    Hepatitis B vaccine  Aged Out    Hib vaccine  Aged Out    Meningococcal (ACWY) vaccine  Aged Out     Recommendations for [employer] Due: see orders and patient instructions/AVS.  . Recommended screening schedule for the next 5-10 years is provided to the patient in written form: see Patient Linh Lyle was seen today for medicare awv. Diagnoses and all orders for this visit:    Medicare annual wellness visit, subsequent  Labs reviewed and discussed. Encouraged healthy diet and regular exercise. Immunizations UTD. See HPI for further details. Schedule mammogram, DEXA. Pneumovax ordered. Fatigue, unspecified type  -     T4, Free; Future  -     TSH without Reflex;  Future  -     Vitamin B12; Future  -     Cortisol Total; Future  - Vitamin D 25 Hydroxy; Future    Age-related osteoporosis without current pathological fracture   -     Vitamin D 25 Hydroxy; Future  -     DEXA BONE DENSITY 2 SITES; Future    Encounter for screening mammogram for breast cancer  -     DELMY DIGITAL SCREEN W OR WO CAD BILATERAL; Future    Routine general medical examination at a health care facility    Other orders  -     Pneumococcal polysaccharide vaccine 23-valent greater than or equal to 3yo subcutaneous/IM              Don Snyder is a 78 y.o. female being evaluated by a Virtual Visit (phone) encounter to address concerns as mentioned above. A caregiver was present when appropriate. Due to this being a TeleHealth encounter (During NRWOQ-00 public health emergency), evaluation of the following organ systems was limited: Vitals/Constitutional/EENT/Resp/CV/GI//MS/Neuro/Skin/Heme-Lymph-Imm. Pursuant to the emergency declaration under the [address] [place] [address], [address] [place] [address] authority and the [employer] Act, this Virtual Visit was conducted with patient's (and/or legal guardian's) consent, to reduce the patient's risk of exposure to COVID-19 and provide necessary medical care. The patient (and/or legal guardian) has also been advised to contact this office for worsening conditions or problems, and seek emergency medical treatment and/or call 911 if deemed necessary. Patient identification was verified at the start of the visit: Yes    Services were provided through phone to substitute for in-person clinic visit. Patient and provider were located at their individual homes. --Brian Arguello MD on 9/2/2020 at 1:25 PM    An electronic signature was used to authenticate this note.

## 2020-08-24 NOTE — PATIENT INSTRUCTIONS
We are committed to providing you with the best care possible. In order to help us achieve these goals please remember to bring all medications, herbal products, and over the counter supplements with you to each visit. If your provider has ordered testing for you, please be sure to follow up with our office if you have not received results within 7 days after the testing took place. *If you receive a survey after visiting one of our offices, please take time to share your experience concerning your physician office visit. These surveys are confidential and no health information about you is shared. We are eager to improve for you and we are counting on your feedback to help make that happen. Personalized Preventive Plan for Orpha Jumbo - 8/24/2020  Medicare offers a range of preventive health benefits. Some of the tests and screenings are paid in full while other may be subject to a deductible, co-insurance, and/or copay. Some of these benefits include a comprehensive review of your medical history including lifestyle, illnesses that may run in your family, and various assessments and screenings as appropriate. After reviewing your medical record and screening and assessments performed today your provider may have ordered immunizations, labs, imaging, and/or referrals for you. A list of these orders (if applicable) as well as your Preventive Care list are included within your After Visit Summary for your review. Other Preventive Recommendations:    · A preventive eye exam performed by an eye specialist is recommended every 1-2 years to screen for glaucoma; cataracts, macular degeneration, and other eye disorders. · A preventive dental visit is recommended every 6 months. · Try to get at least 150 minutes of exercise per week or 10,000 steps per day on a pedometer . · Order or download the FREE \"Exercise & Physical Activity: Your Everyday Guide\" from [employer].  Call 1-524.184.5819 or search The [employer] on [address] [place] [address]. · You need 1316-1813 mg of calcium and 5708-8030 IU of vitamin D per day. It is possible to meet your calcium requirement with diet alone, but a vitamin D supplement is usually necessary to meet this goal.  · When exposed to the sun, use a sunscreen that protects against both UVA and UVB radiation with an SPF of 30 or greater. Reapply every 2 to 3 hours or after sweating, drying off with a towel, or swimming. · Always wear a seat belt when traveling in a car. Always wear a helmet when riding a bicycle or motorcycle.

## 2020-08-27 ENCOUNTER — NURSE ONLY (OUTPATIENT)
Dept: FAMILY MEDICINE CLINIC | Age: 70
End: 2020-08-27
Payer: MEDICARE

## 2020-08-27 DIAGNOSIS — R53.83 FATIGUE, UNSPECIFIED TYPE: ICD-10-CM

## 2020-08-27 LAB
CORTISOL TOTAL: 4 UG/DL
T4 FREE: 0.99 NG/DL (ref 0.93–1.7)
TSH SERPL DL<=0.05 MIU/L-ACNC: 1.7 UIU/ML (ref 0.27–4.2)
VITAMIN B-12: 716 PG/ML (ref 211–946)

## 2020-08-27 PROCEDURE — G0009 ADMIN PNEUMOCOCCAL VACCINE: HCPCS | Performed by: FAMILY MEDICINE

## 2020-08-27 PROCEDURE — 90732 PPSV23 VACC 2 YRS+ SUBQ/IM: CPT | Performed by: FAMILY MEDICINE

## 2020-08-27 NOTE — PROGRESS NOTES
After obtaining consent, and per orders of Dr. Rosie Crowell, injection of pneumovax 23 given in Right deltoid by Christene Brittle. Patient instructed to remain in clinic for 20 minutes afterwards, and to report any adverse reaction to me immediately.

## 2020-09-02 NOTE — PROGRESS NOTES
Alissa Olivas is a 78 y.o. female evaluated via telephone on 8/24/2020. Consent:  She and/or health care decision maker is aware that that she may receive a bill for this telephone service, depending on her insurance coverage, and has provided verbal consent to proceed: Yes      Documentation:  I communicated with the patient and/or health care decision maker about follow up. She reports fatigue regularly, sleeping well. Lab Results   Component Value Date     08/17/2020    K 4.6 08/17/2020     08/17/2020    CO2 28 08/17/2020    BUN 12 08/17/2020    CREATININE 0.6 08/17/2020    GLUCOSE 96 08/17/2020    CALCIUM 10.3 (H) 08/17/2020    PROT 8.2 08/17/2020    LABALBU 4.2 08/17/2020    BILITOT 0.4 08/17/2020    ALKPHOS 71 08/17/2020    AST 18 08/17/2020    ALT 19 08/17/2020    LABGLOM >60 08/17/2020    GFRAA >59 08/17/2020    AGRATIO 1.0 05/11/2020    GLOB 4.3 05/11/2020       Lab Results   Component Value Date    CHOL 201 (H) 08/17/2020    CHOL 199 08/06/2018    CHOL 222 (H) 01/29/2018     Lab Results   Component Value Date    TRIG 56 08/17/2020    TRIG 62 08/06/2018    TRIG 83 01/29/2018     Lab Results   Component Value Date    HDL 82 08/17/2020    HDL 77 08/06/2018    HDL 82 01/29/2018     Lab Results   Component Value Date    LDLCALC 108 08/17/2020    LDLCALC 110 08/06/2018    LDLCALC 123 01/29/2018     No results found for: LABVLDL, VLDL  No results found for: CHOLHDLRATIO  Anxiety  Compliant with medications. No adverse effects from medication. No panic or anxiety attacks since last visit. Symptoms are controlled. No excessive worry or insomnia. No issues with depression        Details of this discussion including any medical advice provided:     PLAN:    Calcium reduced from 10.7 to 10.3, stable, followed by Oncology. Due to fatigue, will check labs. Continue xanax for prn use anxiety. SE profile discussed.          I affirm this is a Patient Initiated Episode with a Patient who

## 2020-09-18 ENCOUNTER — HOSPITAL ENCOUNTER (OUTPATIENT)
Dept: INFUSION THERAPY | Age: 70
Discharge: HOME | End: 2020-09-18
Payer: MEDICARE

## 2020-09-18 DIAGNOSIS — D47.2 SMOLDERING MULTIPLE MYELOMA: ICD-10-CM

## 2020-09-18 LAB
ALBUMIN SERPL-MCNC: 4.2 G/DL (ref 3.5–5.2)
ALP BLD-CCNC: 81 U/L (ref 35–104)
ALT SERPL-CCNC: 31 U/L (ref 9–52)
ANION GAP SERPL CALCULATED.3IONS-SCNC: 9 MMOL/L (ref 7–19)
AST SERPL-CCNC: 27 U/L (ref 14–36)
BILIRUB SERPL-MCNC: 0.6 MG/DL (ref 0.2–1.3)
BUN BLDV-MCNC: 12 MG/DL (ref 7–17)
CALCIUM SERPL-MCNC: 9.9 MG/DL (ref 8.4–10.2)
CHLORIDE BLD-SCNC: 100 MMOL/L (ref 98–111)
CO2: 30 MMOL/L (ref 22–29)
CREAT SERPL-MCNC: 0.9 MG/DL (ref 0.5–1)
GFR NON-AFRICAN AMERICAN: >60
GLOBULIN: 4.1 G/DL
GLUCOSE BLD-MCNC: 103 MG/DL (ref 74–106)
POTASSIUM SERPL-SCNC: 4.8 MMOL/L (ref 3.5–5.1)
SODIUM BLD-SCNC: 139 MMOL/L (ref 137–145)
TOTAL PROTEIN: 8.3 G/DL (ref 6.3–8.2)

## 2020-09-18 PROCEDURE — 80053 COMPREHEN METABOLIC PANEL: CPT

## 2020-09-22 NOTE — PROGRESS NOTES
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 Vickie Brown who was last seen in the office on 11/23/2016. She has close of low laboratory/clinics available for months. She denies any new complaints. She does have chronic complaints of neck pain that she believes is related to arthritis. Also involve her left shoulder. She sees a chiropractor occasionally. 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 Catie Padilla for evaluation of a monoclonal protein finding. She was seen by her primary care for a annual visit in July 2016 and was found to have a elevated protein at 9.3 and mildly elevated calcium levels at 10.7. This prompted a workup with serum protein electrophoresis which showed a M spike of 2, also confirmed by us on 8/4/2016 . K/L light chains ratio 4.08. IgG elevated at 2258 mg/dl. IgA low at 67 mg/dl (immuneparesis). Beta-2 microglobulin was 1.5. She complained of fatigue but has never had any fever no chills no night history of repeated infections, no weight loss. She had occasional back pain but denies any other bone pain. She also complains of occasional crampy abdominal pain due to her inflammatory bowel syndrome. She has no other major medical problems except for anxiety. PET/CT scan 8/19/2016-unremarkable. No evidence of bone metastasis or myelomatous involvement.   Therefore, she is diagnosed with smoldering multiple myeloma, M spike 2 (IgG isotype, immunoparesis IgM, Bence-Abad proteinuria (225 mg/dl). Bone marrow biopsy showed involvement by 10-12% plasma cells. No CRAB criteria met for myeloma. JOSIE.D. visit 2016- she is doing well without no new complaints. MM studies performed on 2016 compared with 2016  K/L ratio = 4.08->7.04  Urine ptn- 229-> 346 mg  M-spike- 2.0->2. · 6/3/2019- SPEP showed M spike 2.2, IgG 2345  · 2019-PTH = 30 (WNL)  · 2019- bone survey was unremarkable and showed no lytic lesions. · 2019- SPEP showed M spike 2.0  · 2019- CMP today showed a normal creatinine 0.6, calcium 10.4 hemoglobin 14.1, WBC 5.8, platelets 201,219. No CRAB criteria. Free kappa light chain 74, lambda light chain 9.2, kappa/lambda ratio = 8.13  · 2020- MM labs showed IgG 2707, IgM 44, kappa light chains = 76, lambda light chain = 6.6, kappa/lambda = 11.6. M -spike 2.2  · 20- MM labs: B2M= 1.5, Kappa light chains=84.2 (H), lambda light chains 6.3, Kappa/lambda=13.37 (H), IgG 2177 (H), IgA 48 (L), IgM 33.  M-spike 2.0    ECO      PAST MEDICAL HISTORY:   Past Medical History:   Diagnosis Date    Anxiety     Elevated blood pressure reading     Elevated serum protein level     GERD (gastroesophageal reflux disease)     History of multiple myeloma     Hypercalcemia     Hyperlipidemia     Monoclonal gammopathy           PAST SURGICAL HISTORY:  Past Surgical History:   Procedure Laterality Date    HYSTERECTOMY      TIBIA FRACTURE SURGERY Left     with pradeep in lower left leg        SOCIAL HISTORY:  Social History     Socioeconomic History    Marital status:      Spouse name: Not on file    Number of children: Not on file    Years of education: Not on file    Highest education level: Not on file   Occupational History    Not on file   Social Needs    Financial resource strain: Not on file    Food insecurity     Worry: Not on file     Inability: Not on file    Transportation needs     Medical: Not on file     Non-medical: Not on file   Tobacco Use    Smoking status: Never Smoker    Smokeless tobacco: Never Used   Substance and Sexual Activity    Alcohol use: No    Drug use: No    Sexual activity: Not on file   Lifestyle    Physical activity     Days per week: Not on file     Minutes per session: Not on file    Stress: Not on file   Relationships    Social connections     Talks on phone: Not on file     Gets together: Not on file     Attends Gnosticist service: Not on file     Active member of club or organization: Not on file     Attends meetings of clubs or organizations: Not on file     Relationship status: Not on file    Intimate partner violence     Fear of current or ex partner: Not on file     Emotionally abused: Not on file     Physically abused: Not on file     Forced sexual activity: Not on file   Other Topics Concern    Not on file   Social History Narrative    Not on file        FAMILY HISTORY:  Family History   Problem Relation Age of Onset    High Blood Pressure Mother     Cancer Father     High Cholesterol Brother         Current Outpatient Medications   Medication Sig Dispense Refill    vitamin B-12 (CYANOCOBALAMIN) 100 MCG tablet Take 50 mcg by mouth daily      docusate sodium (COLACE) 100 MG capsule Take 100 mg by mouth      ALPRAZolam (XANAX) 0.5 MG tablet Take 0.5 mg by mouth nightly as needed for Sleep.  vitamin D (CHOLECALCIFEROL) 1000 UNIT TABS tablet Take 2,000 Units by mouth daily       Ascorbic Acid (VITAMIN C) 500 MG CAPS Take 1 tablet by mouth daily.  aspirin 81 MG tablet Take 81 mg by mouth daily.  melatonin 3 MG TABS tablet Take 3 mg by mouth nightly.  L-LYSINE PO Take 1 tablet by mouth daily. No current facility-administered medications for this visit.          REVIEW OF SYSTEMS:    Constitutional: no fever, no night sweats, fatigue;   HEENT: no blurring of vision, no double vision, no hearing difficulty, no tinnitus,no ulceration, , no dysphagia  Lungs: no cough, no shortness of breath, no wheeze;   CVS: no palpitation, no chest pain, no shortness of breath;  GI: no abdominal pain, no nausea , no vomiting, no constipation;   VIC: no dysuria, frequency and urgency, no hematuria, no kidney stones;   Musculoskeletal: Chronic neck pain, no swelling , stiffness;   Endocrine: no polyuria, polydypsia, no cold or heat intolerence; Hematology/lymphatic: no easy brusing or bleeding, no hx of clotting disorder; no peripheral adenopathy. Dermatology: no skin rash, no eczema, no pruritis;   Psychiatry: no depression, no anxiety,no panic attacks, no suicide ideation; Neurology: no syncope, no seizures, no numbness or tingling of hands, no numbness or tingling of feet, no paresis;     PHYSICAL EXAM:    Vitals signs:  /78   Pulse 75   Temp 97.5 °F (36.4 °C)   Ht 5' 2\" (1.575 m)   Wt 114 lb (51.7 kg)   SpO2 98%   BMI 20.85 kg/m²    Pain scale:  Pain Score:   0 - No pain     CONSTITUTIONAL: Alert, appropriate, no acute distress,   EYES: Non icteric, EOM intact, pupils equal round and reactive to light and accommodation. ENT: Oral mucus membranes moist, no oral pharyngeal lesions. External inspection of ears and nose are normal.   NECK: Supple, no masses. No palpable thyroid mass    CHEST/LUNGS: CTA bilaterally, normal respiratory effort   CARDIOVASCULAR: RRR, no murmurs. No lower extremity edema   ABDOMEN: soft non-tender, active bowel sounds, no hepatosplenomegaly. No palpable masses. EXTREMITIES: warm, Full ROM of all fours extremities. No focal weakness. SKIN: warm, dry with no rashes or lesions  LYMPH: No cervical, clavicular, axillary, or inguinal lymphadenopathy  NEUROLOGIC: follows commands, non focal.   PSYCH: mood and affect appropriate. Alert and oriented to time and place and person.     Relevant Lab findings/reviewed by me:    · 6/3/2019- SPEP showed M spike 2.2, IgG 2345  · 8/9/2019-PTH = 30 (WNL)  · 8/23/2019- bone survey was visit:    Smoldering multiple myeloma ([facility] [address]  -     Comprehensive Metabolic Panel; Future  -     Immunoglobulins, Quantitative; Future  -     [place]/Lambda Free Lt Chains, Serum Quant; Future  -     Beta 2 Microglobulin, Serum; Future  -     Electrophoresis Protein, Serum without Reflex to Immunofixation; Future    Care plan discussed with patient    Hypercalcemia           IgG kappa restricted smoldering multiple myeloma. 8/23/2019- bone survey was unremarkable and showed no lytic lesions. Essentially, no laboratory or clinical evidence of progression to multiple myeloma. We will continue close clinical/laboratory surveillance every 4 month  9/18/20 B2M= 1.5, Kappa light chains=84.2 (H), lambda light chains 6.3, Kappa/lambda=13.37 (H), IgG 2177 (H), IgA 48 (L), IgM 33. M-spike 2.0    Mild hypercalcemia-calcium 10.5->9.9. Resolved    PLAN  · Repeat myeloma serology with QI, SPEP without immunofixation, free serum light chains, B2 M, CMP before next visit before next visit  · RTC 4 months    Follow Up:     Return in about 4 months (around 1/23/2021) for CBC, an appointment with Dr. Stephani Garcia. Labs in 4 months before next appt     En ORNELAS am scribing for Samantha Vann MD. Electronically signed by En Vanegas on 9/23/2020 at 4:14 PM.     I, Dr Codie Mathews, personally performed the services described in this documentation as scribed by En Vanegas MA in my presence and is both accurate and complete. Over 50% of the total visit time of 15 minutes in face to face encounter with the patient, out of which more than 50% of the time was spent in counseling patient or family and coordination of care. Counseling included but was not limited to time spent reviewing labs, imaging studies/ treatment plan and answering questions.

## 2020-09-23 ENCOUNTER — OFFICE VISIT (OUTPATIENT)
Dept: HEMATOLOGY | Age: 70
End: 2020-09-23
Payer: MEDICARE

## 2020-09-23 ENCOUNTER — HOSPITAL ENCOUNTER (OUTPATIENT)
Dept: INFUSION THERAPY | Age: 70
Discharge: HOME | End: 2020-09-23
Payer: MEDICARE

## 2020-09-23 VITALS
WEIGHT: 114 LBS | DIASTOLIC BLOOD PRESSURE: 78 MMHG | OXYGEN SATURATION: 98 % | BODY MASS INDEX: 20.98 KG/M2 | HEIGHT: 62 IN | TEMPERATURE: 97.5 F | SYSTOLIC BLOOD PRESSURE: 128 MMHG | HEART RATE: 75 BPM

## 2020-09-23 DIAGNOSIS — D47.2 SMOLDERING MULTIPLE MYELOMA: ICD-10-CM

## 2020-09-23 DIAGNOSIS — C90.00 MULTIPLE MYELOMA, REMISSION STATUS UNSPECIFIED (HCC): ICD-10-CM

## 2020-09-23 LAB
BASOPHILS ABSOLUTE: 0.03 K/UL (ref 0.01–0.08)
BASOPHILS RELATIVE PERCENT: 0.5 % (ref 0.1–1.2)
EOSINOPHILS ABSOLUTE: 0.15 K/UL (ref 0.04–0.54)
EOSINOPHILS RELATIVE PERCENT: 2.7 % (ref 0.7–7)
HCT VFR BLD CALC: 40.5 % (ref 34.1–44.9)
HEMOGLOBIN: 13.6 G/DL (ref 11.2–15.7)
LYMPHOCYTES ABSOLUTE: 2.06 K/UL (ref 1.18–3.74)
LYMPHOCYTES RELATIVE PERCENT: 37.3 % (ref 19.3–53.1)
MCH RBC QN AUTO: 32.7 PG (ref 25.6–32.2)
MCHC RBC AUTO-ENTMCNC: 33.6 G/DL (ref 32.3–35.5)
MCV RBC AUTO: 97.4 FL (ref 79.4–94.8)
MONOCYTES ABSOLUTE: 0.5 K/UL (ref 0.24–0.82)
MONOCYTES RELATIVE PERCENT: 9.1 % (ref 4.7–12.5)
NEUTROPHILS ABSOLUTE: 2.78 K/UL (ref 1.56–6.13)
NEUTROPHILS RELATIVE PERCENT: 50.4 % (ref 34–71.1)
PDW BLD-RTO: 11.9 % (ref 11.7–14.4)
PLATELET # BLD: 291 K/UL (ref 182–369)
PMV BLD AUTO: 10 FL (ref 7.4–10.4)
RBC # BLD: 4.16 M/UL (ref 3.93–5.22)
WBC # BLD: 5.52 K/UL (ref 3.98–10.04)

## 2020-09-23 PROCEDURE — 36415 COLL VENOUS BLD VENIPUNCTURE: CPT

## 2020-09-23 PROCEDURE — G8420 CALC BMI NORM PARAMETERS: HCPCS | Performed by: INTERNAL MEDICINE

## 2020-09-23 PROCEDURE — 1123F ACP DISCUSS/DSCN MKR DOCD: CPT | Performed by: INTERNAL MEDICINE

## 2020-09-23 PROCEDURE — 99211 OFF/OP EST MAY X REQ PHY/QHP: CPT

## 2020-09-23 PROCEDURE — 99213 OFFICE O/P EST LOW 20 MIN: CPT | Performed by: INTERNAL MEDICINE

## 2020-09-23 PROCEDURE — 4040F PNEUMOC VAC/ADMIN/RCVD: CPT | Performed by: INTERNAL MEDICINE

## 2020-09-23 PROCEDURE — G8427 DOCREV CUR MEDS BY ELIG CLIN: HCPCS | Performed by: INTERNAL MEDICINE

## 2020-09-23 PROCEDURE — 3017F COLORECTAL CA SCREEN DOC REV: CPT | Performed by: INTERNAL MEDICINE

## 2020-09-23 PROCEDURE — 85025 COMPLETE CBC W/AUTO DIFF WBC: CPT

## 2020-09-23 PROCEDURE — 1090F PRES/ABSN URINE INCON ASSESS: CPT | Performed by: INTERNAL MEDICINE

## 2020-09-23 PROCEDURE — 1036F TOBACCO NON-USER: CPT | Performed by: INTERNAL MEDICINE

## 2020-09-23 PROCEDURE — G8400 PT W/DXA NO RESULTS DOC: HCPCS | Performed by: INTERNAL MEDICINE

## 2020-09-23 RX ORDER — UBIDECARENONE 75 MG
50 CAPSULE ORAL DAILY
COMMUNITY

## 2020-10-16 RX ORDER — ALPRAZOLAM 0.5 MG/1
TABLET ORAL
Qty: 60 TABLET | Refills: 2 | Status: SHIPPED | OUTPATIENT
Start: 2020-10-16 | End: 2021-07-27

## 2020-10-16 NOTE — TELEPHONE ENCOUNTER
Wilda Peterson called to request a refill on her medication. Last office visit : 8/24/2020   Next office visit : Visit date not found     Last UDS:   Amphetamine Screen, Urine   Date Value Ref Range Status   08/15/2019 neg  Final     Barbiturate Screen, Urine   Date Value Ref Range Status   08/15/2019 neg  Final     Benzodiazepine Screen, Urine   Date Value Ref Range Status   08/15/2019 positive  Final     Buprenorphine Urine   Date Value Ref Range Status   08/15/2019 neg  Final     Cocaine Metabolite Screen, Urine   Date Value Ref Range Status   08/15/2019 neg  Final     Gabapentin Screen, Urine   Date Value Ref Range Status   08/15/2019 neg  Final     MDMA, Urine   Date Value Ref Range Status   08/15/2019 neg  Final     Methamphetamine, Urine   Date Value Ref Range Status   08/15/2019 neg  Final     Opiate Scrn, Ur   Date Value Ref Range Status   08/15/2019 neg  Final     Oxycodone Screen, Ur   Date Value Ref Range Status   08/15/2019 neg  Final     PCP Screen, Urine   Date Value Ref Range Status   08/15/2019 neg  Final     Propoxyphene Screen, Urine   Date Value Ref Range Status   08/15/2019 neg  Final     THC Screen, Urine   Date Value Ref Range Status   08/15/2019 neg  Final     Tricyclic Antidepressants, Urine   Date Value Ref Range Status   08/15/2019 neg  Final       Last Corazon Lopez: today  Medication Contract: needs update at next visit   Last Fill: 9/4/19    Requested Prescriptions     Pending Prescriptions Disp Refills    ALPRAZolam (XANAX) 0.5 MG tablet [Pharmacy Med Name: ALPRAZOLAM 0.5 MG TAB 0.5 TAB] 60 tablet 2     Sig: TAKE ONE TABLET BY MOUTH TWICE A DAY AS NEEDED FOR SLEEP OR ANXIETY. Please approve or refuse this medication.    Ama Salazar

## 2020-10-29 ENCOUNTER — VIRTUAL VISIT (OUTPATIENT)
Dept: FAMILY MEDICINE CLINIC | Age: 70
End: 2020-10-29
Payer: MEDICARE

## 2020-10-29 PROCEDURE — 99441 PR PHYS/QHP TELEPHONE EVALUATION 5-10 MIN: CPT | Performed by: FAMILY MEDICINE

## 2020-10-29 NOTE — PROGRESS NOTES
Will Aden is a 78 y.o. female evaluated via telephone on 10/29/2020. Consent:  She and/or health care decision maker is aware that that she may receive a bill for this telephone service, depending on her insurance coverage, and has provided verbal consent to proceed: Yes      Documentation:  I communicated with the patient and/or health care decision maker about follow up. DEXA reviewed, still with osteoporosis. Details of this discussion including any medical advice provided:     PLAN  Holding off on fosamax. Patient declines tx at this time, wants to work on weight bearing exercises, vit D supplement, must monitor calcium intake. I affirm this is a Patient Initiated Episode with a Patient who has not had a related appointment within my department in the past 7 days or scheduled within the next 24 hours.     Patient identification was verified at the start of the visit: Yes    Total Time: minutes: 5-10 minutes    Note: not billable if this call serves to triage the patient into an appointment for the relevant concern      Gregory Estes

## 2021-01-19 DIAGNOSIS — C90.00 MULTIPLE MYELOMA, REMISSION STATUS UNSPECIFIED (HCC): Primary | ICD-10-CM

## 2021-03-22 ENCOUNTER — HOSPITAL ENCOUNTER (OUTPATIENT)
Dept: INFUSION THERAPY | Age: 71
Discharge: HOME | End: 2021-03-22
Payer: MEDICARE

## 2021-03-22 DIAGNOSIS — D47.2 SMOLDERING MULTIPLE MYELOMA: ICD-10-CM

## 2021-03-22 DIAGNOSIS — C90.00 MULTIPLE MYELOMA, REMISSION STATUS UNSPECIFIED (HCC): ICD-10-CM

## 2021-03-22 LAB
ALBUMIN SERPL-MCNC: 4.1 G/DL (ref 3.5–5.2)
ALP BLD-CCNC: 73 U/L (ref 35–104)
ALT SERPL-CCNC: 23 U/L (ref 9–52)
ANION GAP SERPL CALCULATED.3IONS-SCNC: 10 MMOL/L (ref 7–19)
AST SERPL-CCNC: 26 U/L (ref 14–36)
BASOPHILS ABSOLUTE: 0.03 K/UL (ref 0.01–0.08)
BASOPHILS RELATIVE PERCENT: 0.5 % (ref 0.1–1.2)
BILIRUB SERPL-MCNC: 0.4 MG/DL (ref 0.2–1.3)
BUN BLDV-MCNC: 15 MG/DL (ref 7–17)
CALCIUM SERPL-MCNC: 10.1 MG/DL (ref 8.4–10.2)
CHLORIDE BLD-SCNC: 100 MMOL/L (ref 98–111)
CO2: 31 MMOL/L (ref 22–29)
CREAT SERPL-MCNC: 0.7 MG/DL (ref 0.5–1)
EOSINOPHILS ABSOLUTE: 0.22 K/UL (ref 0.04–0.54)
EOSINOPHILS RELATIVE PERCENT: 3.7 % (ref 0.7–7)
GFR NON-AFRICAN AMERICAN: >60
GLOBULIN: 4.1 G/DL
GLUCOSE BLD-MCNC: 94 MG/DL (ref 74–106)
HCT VFR BLD CALC: 37.1 % (ref 34.1–44.9)
HEMOGLOBIN: 13.1 G/DL (ref 11.2–15.7)
LYMPHOCYTES ABSOLUTE: 1.87 K/UL (ref 1.18–3.74)
LYMPHOCYTES RELATIVE PERCENT: 31.4 % (ref 19.3–53.1)
MCH RBC QN AUTO: 32.3 PG (ref 25.6–32.2)
MCHC RBC AUTO-ENTMCNC: 35.3 G/DL (ref 32.3–35.5)
MCV RBC AUTO: 91.4 FL (ref 79.4–94.8)
MONOCYTES ABSOLUTE: 0.47 K/UL (ref 0.24–0.82)
MONOCYTES RELATIVE PERCENT: 7.9 % (ref 4.7–12.5)
NEUTROPHILS ABSOLUTE: 3.37 K/UL (ref 1.56–6.13)
NEUTROPHILS RELATIVE PERCENT: 56.5 % (ref 34–71.1)
PDW BLD-RTO: 11.8 % (ref 11.7–14.4)
PLATELET # BLD: 301 K/UL (ref 182–369)
PMV BLD AUTO: 10.1 FL (ref 7.4–10.4)
POTASSIUM SERPL-SCNC: 4.7 MMOL/L (ref 3.5–5.1)
RBC # BLD: 4.06 M/UL (ref 3.93–5.22)
SODIUM BLD-SCNC: 141 MMOL/L (ref 137–145)
TOTAL PROTEIN: 8.2 G/DL (ref 6.3–8.2)
WBC # BLD: 5.96 K/UL (ref 3.98–10.04)

## 2021-03-22 PROCEDURE — 85025 COMPLETE CBC W/AUTO DIFF WBC: CPT

## 2021-03-22 PROCEDURE — 80053 COMPREHEN METABOLIC PANEL: CPT

## 2021-03-22 PROCEDURE — 36415 COLL VENOUS BLD VENIPUNCTURE: CPT

## 2021-03-26 NOTE — PROGRESS NOTES
Idalmis Fernandez   1950  3/29/2021     Chief Complaint   Patient presents with    Follow-up     Smoldering multiple myeloma         INTERVAL HISTORY/HISTORY OF PRESENT ILLNESS:    Diagnosis  · Smoldering multiple myeloma, November 2016  · IgG kappa restricted    Treatment summary  · Close laboratory/clinical surveillance    Interval history  Ms Dorian Mejias is a pleasant 78years old  female with smoldering multiple myeloma followed by Dr. Debora Tena who was last seen in the office on 11/23/2016. She has close of low laboratory/clinics available for months. She denies any new complaints. 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 Donnie Ni for evaluation of a monoclonal protein finding. She was seen by her primary care for a annual visit in July 2016 and was found to have a elevated protein at 9.3 and mildly elevated calcium levels at 10.7. This prompted a workup with serum protein electrophoresis which showed a M spike of 2, also confirmed by us on 8/4/2016 . K/L light chains ratio 4.08. IgG elevated at 2258 mg/dl. IgA low at 67 mg/dl (immuneparesis). Beta-2 microglobulin was 1.5. She complained of fatigue but has never had any fever no chills no night history of repeated infections, no weight loss. She had occasional back pain but denies any other bone pain. She also complains of occasional crampy abdominal pain due to her inflammatory bowel syndrome. She has no other major medical problems except for anxiety. PET/CT scan 8/19/2016-unremarkable. No evidence of bone metastasis or myelomatous involvement. Therefore, she is diagnosed with smoldering multiple myeloma, M spike 2 (IgG isotype, immunoparesis IgM, Bence-Abad proteinuria (225 mg/dl). Bone marrow biopsy showed involvement by 10-12% plasma cells. No CRAB criteria met for myeloma.   M.D. visit 11/23/2016- she is doing Smokeless tobacco: Never Used   Substance and Sexual Activity    Alcohol use: No    Drug use: No    Sexual activity: Not on file   Lifestyle    Physical activity     Days per week: Not on file     Minutes per session: Not on file    Stress: Not on file   Relationships    Social connections     Talks on phone: Not on file     Gets together: Not on file     Attends Yazidi service: Not on file     Active member of club or organization: Not on file     Attends meetings of clubs or organizations: Not on file     Relationship status: Not on file    Intimate partner violence     Fear of current or ex partner: Not on file     Emotionally abused: Not on file     Physically abused: Not on file     Forced sexual activity: Not on file   Other Topics Concern    Not on file   Social History Narrative    Not on file        FAMILY HISTORY:  Family History   Problem Relation Age of Onset    High Blood Pressure Mother     Cancer Father     High Cholesterol Brother         Current Outpatient Medications   Medication Sig Dispense Refill    vitamin B-12 (CYANOCOBALAMIN) 100 MCG tablet Take 50 mcg by mouth daily      docusate sodium (COLACE) 100 MG capsule Take 100 mg by mouth      vitamin D (CHOLECALCIFEROL) 1000 UNIT TABS tablet Take 2,000 Units by mouth daily       Ascorbic Acid (VITAMIN C) 500 MG CAPS Take 1 tablet by mouth daily.  aspirin 81 MG tablet Take 81 mg by mouth daily.  melatonin 3 MG TABS tablet Take 3 mg by mouth nightly.  L-LYSINE PO Take 1 tablet by mouth daily. No current facility-administered medications for this visit.          REVIEW OF SYSTEMS:    Constitutional: no fever, no night sweats, fatigue;   HEENT: no blurring of vision, no double vision, no hearing difficulty, no tinnitus,no ulceration, , no dysphagia  Lungs: no cough, no shortness of breath, no wheeze;   CVS: no palpitation, no chest pain, no shortness of breath;  GI: no abdominal pain, no nausea , no vomiting, no constipation;   VIC: no dysuria, frequency and urgency, no hematuria, no kidney stones;   Musculoskeletal: Chronic neck pain, no swelling , stiffness;   Endocrine: no polyuria, polydypsia, no cold or heat intolerence; Hematology/lymphatic: no easy brusing or bleeding, no hx of clotting disorder; no peripheral adenopathy. Dermatology: no skin rash, no eczema, no pruritis;   Psychiatry: no depression, no anxiety,no panic attacks, no suicide ideation; Neurology: no syncope, no seizures, no numbness or tingling of hands, no numbness or tingling of feet, no paresis;     PHYSICAL EXAM:    Vitals signs:  /80   Pulse 70   Temp 97.5 °F (36.4 °C)   Ht 5' 2\" (1.575 m)   Wt 119 lb 12.8 oz (54.3 kg)   SpO2 98%   BMI 21.91 kg/m²    Pain scale:  Pain Score:   0 - No pain     CONSTITUTIONAL: Alert, appropriate, no acute distress,   EYES: Non icteric, EOM intact, pupils equal round and reactive to light and accommodation. ENT: Oral mucus membranes moist, no oral pharyngeal lesions. External inspection of ears and nose are normal.   NECK: Supple, no masses. No palpable thyroid mass    CHEST/LUNGS: CTA bilaterally, normal respiratory effort   CARDIOVASCULAR: RRR, no murmurs. No lower extremity edema   ABDOMEN: soft non-tender, active bowel sounds, no hepatosplenomegaly. No palpable masses. EXTREMITIES: warm, Full ROM of all fours extremities. No focal weakness. SKIN: warm, dry with no rashes or lesions  LYMPH: No cervical, clavicular, axillary, or inguinal lymphadenopathy  NEUROLOGIC: follows commands, non focal.   PSYCH: mood and affect appropriate. Alert and oriented to time and place and person.     Relevant Lab findings/reviewed by me:  3/22/2021- MM labs IgG 2704, Beta-2 Microglobulin 1.5, Kappa 75.7, Kappa/Lambda Ratio 9.23, M-spike 2.1    Lab Results   Component Value Date     03/22/2021    K 4.7 03/22/2021     03/22/2021    CO2 31 (H) 03/22/2021    BUN 15 03/22/2021    CREATININE 0.7 03/22/2021    GLUCOSE 94 03/22/2021    CALCIUM 10.1 03/22/2021    PROT 8.1 03/22/2021    LABALBU 4.1 03/22/2021    BILITOT 0.4 03/22/2021    ALKPHOS 73 03/22/2021    AST 26 03/22/2021    ALT 23 03/22/2021    LABGLOM >60 03/22/2021    GFRAA >59 08/17/2020    AGRATIO 1.0 03/22/2021    GLOB 4.1 (H) 03/22/2021         Relevant Imaging studies/reviewed by me:  No results found. ASSESSMENT    Orders Placed This Encounter   Procedures    Beta 2 Microglobulin, Serum     Standing Status:   Future     Standing Expiration Date:   9/29/2022    Electrophoresis Protein, Serum without Reflex to Immunofixation     Standing Status:   Future     Standing Expiration Date:   3/29/2022    Immunoglobulins, Quantitative     Standing Status:   Future     Standing Expiration Date:   3/29/2022    [place]/Lambda Free Lt Chains, Serum Quant     Standing Status:   Future     Standing Expiration Date:   9/29/2022    Comprehensive Metabolic Panel     Standing Status:   Future     Standing Expiration Date:   9/29/2022      Krys Mcclelland was seen today for follow-up. Diagnoses and all orders for this visit:    Smoldering multiple myeloma ([facility] [address]  -     Beta 2 Microglobulin, Serum; Future  -     Electrophoresis Protein, Serum without Reflex to Immunofixation; Future  -     Immunoglobulins, Quantitative; Future  -     [place]/Lambda Free Lt Chains, Serum Quant; Future  -     Comprehensive Metabolic Panel; Future    Care plan discussed with patient    Hypercalcemia           IgG kappa restricted smoldering multiple myeloma. 8/23/2019- bone survey was unremarkable and showed no lytic lesions. Essentially, no laboratory or clinical evidence of progression to multiple myeloma.   We will continue close clinical/laboratory surveillance every 4 month  · 9/18/20 MM labs B2M 1.5, Kappa light chains 84.2, Kappa/lambda=13.37, IgG 2177, M-spike 2.0  · 3/22/2021- MM labs IgG 2704, Beta-2 Microglobulin 1.5, Kappa 75.7, Kappa/Lambda Ratio 9.23, M-spike 2.1    Mild hypercalcemia-calcium 10.1. Resolved      PLAN  · Repeat myeloma serology with QI, SPEP without immunofixation, free serum light chains, B2M, CMP before next visit before next visit  · RTC 6 months with FIORELLA Cardona    Follow Up:     Return in about 6 months (around 9/29/2021) for CBC, Appointment with FIORELLA Cardona. Labs 1 week before next visit     I, Génesis Estes, am scribing for Denise Robles MD. Electronically signed by Génesis Estes RN on 3/29/2021 at 3:43 PM CDT. I, Dr Maddy Blanc, personally performed the services described in this documentation as scribed by Génesis Estes RN in my presence and is both accurate and complete. I have seen, examined and reviewed this patient medication list, appropriate labs and imaging studies. I reviewed relevant medical records and others physicians notes. I discussed the plans of care with the patient. I answered all the questions to the patients satisfaction. I have also reviewed the chief complaint (CC) and part of the history (History of Present Illness (HPI), Past Family Social History [facility]), or Review of Systems (ROS) and made changes when appropriated.        (Please note that portions of this note were completed with a voice recognition program. Efforts were made to edit the dictations but occasionally words are mis-transcribed.)

## 2021-03-29 ENCOUNTER — OFFICE VISIT (OUTPATIENT)
Dept: HEMATOLOGY | Age: 71
End: 2021-03-29
Payer: MEDICARE

## 2021-03-29 ENCOUNTER — HOSPITAL ENCOUNTER (OUTPATIENT)
Dept: INFUSION THERAPY | Age: 71
Discharge: HOME | End: 2021-03-29
Payer: MEDICARE

## 2021-03-29 VITALS
WEIGHT: 119.8 LBS | OXYGEN SATURATION: 98 % | HEART RATE: 70 BPM | SYSTOLIC BLOOD PRESSURE: 120 MMHG | DIASTOLIC BLOOD PRESSURE: 80 MMHG | BODY MASS INDEX: 22.05 KG/M2 | HEIGHT: 62 IN | TEMPERATURE: 97.5 F

## 2021-03-29 DIAGNOSIS — Z71.89 CARE PLAN DISCUSSED WITH PATIENT: ICD-10-CM

## 2021-03-29 DIAGNOSIS — C90.00 MULTIPLE MYELOMA, REMISSION STATUS UNSPECIFIED (HCC): ICD-10-CM

## 2021-03-29 DIAGNOSIS — E83.52 HYPERCALCEMIA: ICD-10-CM

## 2021-03-29 DIAGNOSIS — D47.2 SMOLDERING MULTIPLE MYELOMA: Primary | ICD-10-CM

## 2021-03-29 LAB
BASOPHILS ABSOLUTE: 0.03 K/UL (ref 0.01–0.08)
BASOPHILS RELATIVE PERCENT: 0.5 % (ref 0.1–1.2)
EOSINOPHILS ABSOLUTE: 0.28 K/UL (ref 0.04–0.54)
EOSINOPHILS RELATIVE PERCENT: 4.6 % (ref 0.7–7)
HCT VFR BLD CALC: 40.7 % (ref 34.1–44.9)
HEMOGLOBIN: 13.6 G/DL (ref 11.2–15.7)
LYMPHOCYTES ABSOLUTE: 2.11 K/UL (ref 1.18–3.74)
LYMPHOCYTES RELATIVE PERCENT: 34.5 % (ref 19.3–53.1)
MCH RBC QN AUTO: 32.5 PG (ref 25.6–32.2)
MCHC RBC AUTO-ENTMCNC: 33.4 G/DL (ref 32.3–35.5)
MCV RBC AUTO: 97.1 FL (ref 79.4–94.8)
MONOCYTES ABSOLUTE: 0.49 K/UL (ref 0.24–0.82)
MONOCYTES RELATIVE PERCENT: 8 % (ref 4.7–12.5)
NEUTROPHILS ABSOLUTE: 3.2 K/UL (ref 1.56–6.13)
NEUTROPHILS RELATIVE PERCENT: 52.4 % (ref 34–71.1)
PDW BLD-RTO: 12.3 % (ref 11.7–14.4)
PLATELET # BLD: 236 K/UL (ref 182–369)
PMV BLD AUTO: 10.9 FL (ref 7.4–10.4)
RBC # BLD: 4.19 M/UL (ref 3.93–5.22)
WBC # BLD: 6.11 K/UL (ref 3.98–10.04)

## 2021-03-29 PROCEDURE — 4040F PNEUMOC VAC/ADMIN/RCVD: CPT | Performed by: INTERNAL MEDICINE

## 2021-03-29 PROCEDURE — 36415 COLL VENOUS BLD VENIPUNCTURE: CPT

## 2021-03-29 PROCEDURE — 99213 OFFICE O/P EST LOW 20 MIN: CPT | Performed by: INTERNAL MEDICINE

## 2021-03-29 PROCEDURE — 85025 COMPLETE CBC W/AUTO DIFF WBC: CPT

## 2021-03-29 PROCEDURE — 3017F COLORECTAL CA SCREEN DOC REV: CPT | Performed by: INTERNAL MEDICINE

## 2021-03-29 PROCEDURE — G8484 FLU IMMUNIZE NO ADMIN: HCPCS | Performed by: INTERNAL MEDICINE

## 2021-03-29 PROCEDURE — 1036F TOBACCO NON-USER: CPT | Performed by: INTERNAL MEDICINE

## 2021-03-29 PROCEDURE — 1090F PRES/ABSN URINE INCON ASSESS: CPT | Performed by: INTERNAL MEDICINE

## 2021-03-29 PROCEDURE — 1123F ACP DISCUSS/DSCN MKR DOCD: CPT | Performed by: INTERNAL MEDICINE

## 2021-03-29 PROCEDURE — G8420 CALC BMI NORM PARAMETERS: HCPCS | Performed by: INTERNAL MEDICINE

## 2021-03-29 PROCEDURE — G8399 PT W/DXA RESULTS DOCUMENT: HCPCS | Performed by: INTERNAL MEDICINE

## 2021-03-29 PROCEDURE — 99211 OFF/OP EST MAY X REQ PHY/QHP: CPT

## 2021-03-29 PROCEDURE — G8427 DOCREV CUR MEDS BY ELIG CLIN: HCPCS | Performed by: INTERNAL MEDICINE

## 2021-07-27 DIAGNOSIS — F41.9 ANXIETY: ICD-10-CM

## 2021-07-27 RX ORDER — ALPRAZOLAM 0.5 MG/1
TABLET ORAL
Qty: 60 TABLET | Refills: 2 | Status: SHIPPED | OUTPATIENT
Start: 2021-07-27 | End: 2021-08-26

## 2021-07-27 NOTE — TELEPHONE ENCOUNTER
Ana Love called to request a refill on her medication. Last office visit : 10/29/2020   Next office visit : 8/31/2021     Last UDS:   Amphetamine Screen, Urine   Date Value Ref Range Status   08/15/2019 neg  Final     Barbiturate Screen, Urine   Date Value Ref Range Status   08/15/2019 neg  Final     Benzodiazepine Screen, Urine   Date Value Ref Range Status   08/15/2019 positive  Final     Buprenorphine Urine   Date Value Ref Range Status   08/15/2019 neg  Final     Cocaine Metabolite Screen, Urine   Date Value Ref Range Status   08/15/2019 neg  Final     Gabapentin Screen, Urine   Date Value Ref Range Status   08/15/2019 neg  Final     MDMA, Urine   Date Value Ref Range Status   08/15/2019 neg  Final     Methamphetamine, Urine   Date Value Ref Range Status   08/15/2019 neg  Final     Opiate Scrn, Ur   Date Value Ref Range Status   08/15/2019 neg  Final     Oxycodone Screen, Ur   Date Value Ref Range Status   08/15/2019 neg  Final     PCP Screen, Urine   Date Value Ref Range Status   08/15/2019 neg  Final     Propoxyphene Screen, Urine   Date Value Ref Range Status   08/15/2019 neg  Final     THC Screen, Urine   Date Value Ref Range Status   08/15/2019 neg  Final     Tricyclic Antidepressants, Urine   Date Value Ref Range Status   08/15/2019 neg  Final       Last Michelle Diaz: today  Medication Contract: 8/19   Last Fill:4/1/21    Requested Prescriptions     Pending Prescriptions Disp Refills    ALPRAZolam (XANAX) 0.5 MG tablet [Pharmacy Med Name: ALPRAZOLAM 0.5 MG TABS 0.5 Tablet] 60 tablet 2     Sig: TAKE ONE TABLET BY MOUTH TWICE A DAY AS NEEDED FOR SLEEP OR ANXIETY ** MAY CAUSE DROWSINESS **         Please approve or refuse this medication.    Philip Salcido MA

## 2021-08-16 RX ORDER — PROMETHAZINE HYDROCHLORIDE 25 MG/1
25 TABLET ORAL 4 TIMES DAILY PRN
Qty: 20 TABLET | Refills: 0 | Status: SHIPPED | OUTPATIENT
Start: 2021-08-16 | End: 2021-08-23

## 2021-08-23 DIAGNOSIS — E83.52 HYPERCALCEMIA: ICD-10-CM

## 2021-08-23 DIAGNOSIS — E78.2 MIXED HYPERLIPIDEMIA: Primary | ICD-10-CM

## 2021-08-23 DIAGNOSIS — R53.83 FATIGUE, UNSPECIFIED TYPE: ICD-10-CM

## 2021-08-31 ENCOUNTER — OFFICE VISIT (OUTPATIENT)
Dept: FAMILY MEDICINE CLINIC | Age: 71
End: 2021-08-31
Payer: MEDICARE

## 2021-08-31 VITALS
HEART RATE: 71 BPM | WEIGHT: 120 LBS | HEIGHT: 62 IN | SYSTOLIC BLOOD PRESSURE: 132 MMHG | TEMPERATURE: 96.9 F | BODY MASS INDEX: 22.08 KG/M2 | DIASTOLIC BLOOD PRESSURE: 72 MMHG | OXYGEN SATURATION: 97 %

## 2021-08-31 DIAGNOSIS — Z00.00 ROUTINE GENERAL MEDICAL EXAMINATION AT A HEALTH CARE FACILITY: ICD-10-CM

## 2021-08-31 DIAGNOSIS — Z00.00 MEDICARE ANNUAL WELLNESS VISIT, SUBSEQUENT: Primary | ICD-10-CM

## 2021-08-31 DIAGNOSIS — E78.2 MIXED HYPERLIPIDEMIA: ICD-10-CM

## 2021-08-31 DIAGNOSIS — C90.00 MULTIPLE MYELOMA, REMISSION STATUS UNSPECIFIED (HCC): ICD-10-CM

## 2021-08-31 DIAGNOSIS — F41.9 ANXIETY: ICD-10-CM

## 2021-08-31 DIAGNOSIS — Z12.31 ENCOUNTER FOR SCREENING MAMMOGRAM FOR BREAST CANCER: ICD-10-CM

## 2021-08-31 DIAGNOSIS — I65.23 BILATERAL CAROTID ARTERY STENOSIS: ICD-10-CM

## 2021-08-31 DIAGNOSIS — E55.9 VITAMIN D DEFICIENCY: ICD-10-CM

## 2021-08-31 DIAGNOSIS — E83.52 HYPERCALCEMIA: ICD-10-CM

## 2021-08-31 PROCEDURE — 1090F PRES/ABSN URINE INCON ASSESS: CPT | Performed by: FAMILY MEDICINE

## 2021-08-31 PROCEDURE — G8399 PT W/DXA RESULTS DOCUMENT: HCPCS | Performed by: FAMILY MEDICINE

## 2021-08-31 PROCEDURE — G8427 DOCREV CUR MEDS BY ELIG CLIN: HCPCS | Performed by: FAMILY MEDICINE

## 2021-08-31 PROCEDURE — 3017F COLORECTAL CA SCREEN DOC REV: CPT | Performed by: FAMILY MEDICINE

## 2021-08-31 PROCEDURE — 99214 OFFICE O/P EST MOD 30 MIN: CPT | Performed by: FAMILY MEDICINE

## 2021-08-31 PROCEDURE — 1123F ACP DISCUSS/DSCN MKR DOCD: CPT | Performed by: FAMILY MEDICINE

## 2021-08-31 PROCEDURE — 4040F PNEUMOC VAC/ADMIN/RCVD: CPT | Performed by: FAMILY MEDICINE

## 2021-08-31 PROCEDURE — G0439 PPPS, SUBSEQ VISIT: HCPCS | Performed by: FAMILY MEDICINE

## 2021-08-31 PROCEDURE — G8420 CALC BMI NORM PARAMETERS: HCPCS | Performed by: FAMILY MEDICINE

## 2021-08-31 PROCEDURE — 1036F TOBACCO NON-USER: CPT | Performed by: FAMILY MEDICINE

## 2021-08-31 ASSESSMENT — PATIENT HEALTH QUESTIONNAIRE - PHQ9
SUM OF ALL RESPONSES TO PHQ9 QUESTIONS 1 & 2: 0
SUM OF ALL RESPONSES TO PHQ QUESTIONS 1-9: 0
1. LITTLE INTEREST OR PLEASURE IN DOING THINGS: 0
SUM OF ALL RESPONSES TO PHQ QUESTIONS 1-9: 0
2. FEELING DOWN, DEPRESSED OR HOPELESS: 0
SUM OF ALL RESPONSES TO PHQ QUESTIONS 1-9: 0

## 2021-08-31 ASSESSMENT — LIFESTYLE VARIABLES: HOW OFTEN DO YOU HAVE A DRINK CONTAINING ALCOHOL: 0

## 2021-08-31 NOTE — PROGRESS NOTES
Medicare Annual Wellness Visit  Name: aKtina Rivera Date: 2021   MRN: [id number] Sex: Female   Age: 73 y.o. Ethnicity: Non- / Non    : 1950 Race: White (non-)      Sharlee Harada is here for Medicare AWV    Screenings for behavioral, psychosocial and functional/safety risks, and cognitive dysfunction are all negative except as indicated below. These results, as well as other patient data from the [address] [employer] [facility], are documented in Flowsheets linked to this Encounter. Cologuard UTD. Mammogram due in October. She is considering covid vaccine. DEXA UTD. No pap needed. Allergies   Allergen Reactions    Bentyl [Dicyclomine Hcl]      Makes her feel disoriented    Darvocet A500 [Propoxyphene N-Acetaminophen]     Propoxyphene     Sertraline Hcl Other (See Comments)    Albuterol Palpitations    Clarithromycin Palpitations    Lortab [Hydrocodone-Acetaminophen] Nausea Only and Nausea And Vomiting    Penicillins Nausea Only and Nausea And Vomiting     All \"Cillins\"         Prior to Visit Medications    Medication Sig Taking? Authorizing Provider   vitamin B-12 (CYANOCOBALAMIN) 100 MCG tablet Take 50 mcg by mouth daily Yes Historical Provider, MD   docusate sodium (COLACE) 100 MG capsule Take 100 mg by mouth Yes Historical Provider, MD   vitamin D (CHOLECALCIFEROL) 1000 UNIT TABS tablet Take 2,000 Units by mouth daily  Yes Historical Provider, MD   Ascorbic Acid (VITAMIN C) 500 MG CAPS Take 1 tablet by mouth daily. Yes Historical Provider, MD   aspirin 81 MG tablet Take 81 mg by mouth every 7 days  Yes Historical Provider, MD   melatonin 3 MG TABS tablet Take 3 mg by mouth nightly. Yes Historical Provider, MD   L-LYSINE PO Take 1 tablet by mouth daily.   Patient not taking: Reported on 2021  Historical Provider, MD         Past Medical History:   Diagnosis Date    Anxiety     Elevated blood pressure reading     Elevated serum protein level     GERD Risk Interventions:  · Takes xanax as needed.      Health Habits/Nutrition:  Health Habits/Nutrition  Do you exercise for at least 20 minutes 2-3 times per week?: (!) No  Have you lost any weight without trying in the past 3 months?: No  Do you eat only one meal per day?: No  Have you seen the dentist within the past year?: (!) No  Body mass index: 21.95  Health Habits/Nutrition Interventions:  · Dental exam overdue:  patient encouraged to make appointment with his/her dentist    Hearing/Vision:  No exam data present  Hearing/Vision  Do you or your family notice any trouble with your hearing that hasn't been managed with hearing aids?: No  Do you have difficulty driving, watching TV, or doing any of your daily activities because of your eyesight?: No  Have you had an eye exam within the past year?: (!) No  Hearing/Vision Interventions:  · Vision concerns:  patient encouraged to make appointment with his/her eye specialist      Personalized Preventive Plan   Current Health Maintenance Status  Immunization History   Administered Date(s) Administered    Pneumococcal Conjugate 13-valent ([id number]) 08/13/2018    Pneumococcal Polysaccharide ([id number]) 08/27/2020    Tdap (Boostrix, Adacel) 07/06/2011        Health Maintenance   Topic Date Due    Hepatitis C screen  Never done    COVID-19 Vaccine (1) Never done    Shingles Vaccine (1 of 2) Never done    DTaP/Tdap/Td vaccine (2 - Td or Tdap) 07/06/2021    Annual Wellness Visit (AWV)  08/25/2021    Flu vaccine (1) Never done    Colon cancer screen fecal DNA test (Cologuard)  09/10/2022    Breast cancer screen  10/19/2022    Lipid screen  08/24/2026    DEXA (modify frequency per FRAX score)  Completed    Pneumococcal 65+ yrs at Risk Vaccine  Completed    Hepatitis A vaccine  Aged Out    Hepatitis B vaccine  Aged Out    Hib vaccine  Aged Out    Meningococcal (ACWY) vaccine  Aged Out     Recommendations for [place] Due: see orders and patient instructions/AVS.  . Recommended screening schedule for the next 5-10 years is provided to the patient in written form: see Patient Yaron Barnard was seen today for medicare awv. Diagnoses and all orders for this visit:    Medicare annual wellness visit, subsequent  Labs reviewed and discussed. Encouraged healthy diet and regular exercise. Immunizations UTD. See HPI for further details. Mammogram scheduled. Carotids scheduled. Encounter for screening mammogram for breast cancer  -     [facility] DIGITAL SCREEN W OR WO CAD BILATERAL;  Future    Mixed hyperlipidemia    Multiple myeloma, remission status unspecified (HCC)    Routine general medical examination at a health care facility    Anxiety    Hypercalcemia    Bilateral carotid artery stenosis

## 2021-08-31 NOTE — PATIENT INSTRUCTIONS
Personalized Preventive Plan for Camille Jimenez - 8/31/2021  Medicare offers a range of preventive health benefits. Some of the tests and screenings are paid in full while other may be subject to a deductible, co-insurance, and/or copay. Some of these benefits include a comprehensive review of your medical history including lifestyle, illnesses that may run in your family, and various assessments and screenings as appropriate. After reviewing your medical record and screening and assessments performed today your provider may have ordered immunizations, labs, imaging, and/or referrals for you. A list of these orders (if applicable) as well as your Preventive Care list are included within your After Visit Summary for your review. Other Preventive Recommendations:    · A preventive eye exam performed by an eye specialist is recommended every 1-2 years to screen for glaucoma; cataracts, macular degeneration, and other eye disorders. · A preventive dental visit is recommended every 6 months. · Try to get at least 150 minutes of exercise per week or 10,000 steps per day on a pedometer . · Order or download the FREE \"Exercise & Physical Activity: Your Everyday Guide\" from [employer]. Call 6-320.250.9524 or search [employer] online. · You need 4346-4394 mg of calcium and 3714-0423 IU of vitamin D per day. It is possible to meet your calcium requirement with diet alone, but a vitamin D supplement is usually necessary to meet this goal.  · When exposed to the sun, use a sunscreen that protects against both UVA and UVB radiation with an SPF of 30 or greater. Reapply every 2 to 3 hours or after sweating, drying off with a towel, or swimming. · Always wear a seat belt when traveling in a car. Always wear a helmet when riding a bicycle or motorcycle.

## 2021-09-12 PROBLEM — I65.29 CAROTID STENOSIS: Status: RESOLVED | Noted: 2018-02-05 | Resolved: 2021-09-12

## 2021-09-12 PROBLEM — E55.9 VITAMIN D DEFICIENCY: Status: ACTIVE | Noted: 2021-09-12

## 2021-09-12 ASSESSMENT — ENCOUNTER SYMPTOMS
CHEST TIGHTNESS: 0
ANAL BLEEDING: 0
COUGH: 0
DIARRHEA: 0
SHORTNESS OF BREATH: 0
ABDOMINAL PAIN: 0
CONSTIPATION: 0
NAUSEA: 0

## 2021-09-12 NOTE — PROGRESS NOTES
Physical Therapy Screening:  Services are indicated and therapy order is required post surgery with WB status. Pt planned for R rodrigo if cleared per ortho note. An InBasket screening referral was triggered for physical therapy based on results obtained during the nursing admission assessment. The patients chart was reviewed and the patient is appropriate for a skilled therapy evaluation. Please order a consult for physical therapy if you are in agreement and would like an evaluation to be completed. Thank you.     Ryan Dominguez, PT [facility]  [facility]  [id number] [place] [address] [place]al [address]  Dept: [phone]  Dept Fax: [phone]  Loc: [phone]     Adrianne Mcclelland is a 73 y.o. female who presents today for her medical conditions/complaintsas noted below. Adrianne Mcclelland is c/o of Medicare AWV        HPI:   Patient is here for AWV and follow up. Lab Results   Component Value Date    CHOL 212 (H) 08/24/2021    CHOL 201 (H) 08/17/2020    CHOL 199 08/06/2018     Lab Results   Component Value Date    TRIG 62 08/24/2021    TRIG 56 08/17/2020    TRIG 62 08/06/2018     Lab Results   Component Value Date    HDL 77 08/24/2021    HDL 82 08/17/2020    HDL 77 08/06/2018     Lab Results   Component Value Date    LDLCALC 123 08/24/2021    LDLCALC 108 08/17/2020    LDLCALC 110 08/06/2018     No results found for: LABVLDL, VLDL  No results found for: CHOLHDLRATIO  Lab Results   Component Value Date     08/24/2021    K 4.7 08/24/2021     08/24/2021    CO2 27 08/24/2021    BUN 12 08/24/2021    CREATININE 0.6 08/24/2021    GLUCOSE 103 08/24/2021    CALCIUM 10.1 08/24/2021    PROT 8.4 08/24/2021    LABALBU 4.0 08/24/2021    BILITOT 0.5 08/24/2021    ALKPHOS 76 08/24/2021    AST 18 08/24/2021    ALT 18 08/24/2021    LABGLOM >60 08/24/2021    GFRAA >59 08/24/2021    AGRATIO 1.0 03/22/2021    GLOB 4.1 03/22/2021       Lab Results   Component Value Date    WBC 5.7 08/24/2021    HGB 12.7 08/24/2021    HCT 39.5 08/24/2021    MCV 96.1 08/24/2021     08/24/2021     Anxiety  Compliant with medications. No adverse effects from medication. No panic or anxiety attacks since last visit. Symptoms are controlled. No excessive worry or insomnia. No issues with depression    Carotid US due. No symptoms.      HPI    Past Medical History:   Diagnosis Date    Anxiety     Elevated blood pressure reading     Elevated serum protein level     GERD (gastroesophageal reflux disease)     History of multiple myeloma     Hypercalcemia     Hyperlipidemia     Monoclonal gammopathy      Past Surgical History:   Procedure Laterality Date    HYSTERECTOMY      TIBIA FRACTURE SURGERY Left     with pradeep in lower left leg       Family History   Problem Relation Age of Onset    High Blood Pressure Mother     Cancer Father     High Cholesterol Brother        Social History     Tobacco Use    Smoking status: Never Smoker    Smokeless tobacco: Never Used   Substance Use Topics    Alcohol use: No      Current Outpatient Medications   Medication Sig Dispense Refill    vitamin B-12 (CYANOCOBALAMIN) 100 MCG tablet Take 50 mcg by mouth daily      docusate sodium (COLACE) 100 MG capsule Take 100 mg by mouth      vitamin D (CHOLECALCIFEROL) 1000 UNIT TABS tablet Take 2,000 Units by mouth daily       Ascorbic Acid (VITAMIN C) 500 MG CAPS Take 1 tablet by mouth daily.  aspirin 81 MG tablet Take 81 mg by mouth every 7 days       melatonin 3 MG TABS tablet Take 3 mg by mouth nightly.  L-LYSINE PO Take 1 tablet by mouth daily. (Patient not taking: Reported on 8/31/2021)       No current facility-administered medications for this visit.      Allergies   Allergen Reactions    Bentyl [Dicyclomine Hcl]      Makes her feel disoriented    Darvocet A500 [Propoxyphene N-Acetaminophen]     Propoxyphene     Sertraline Hcl Other (See Comments)    Albuterol Palpitations    Clarithromycin Palpitations    Lortab [Hydrocodone-Acetaminophen] Nausea Only and Nausea And Vomiting    Penicillins Nausea Only and Nausea And Vomiting     All \"Cillins\"       Health Maintenance   Topic Date Due    Hepatitis C screen  Never done    COVID-19 Vaccine (1) Never done    Shingles Vaccine (1 of 2) Never done    DTaP/Tdap/Td vaccine (2 - Td or Tdap) 07/06/2021    Annual Wellness Visit (AWV)  08/25/2021    Flu vaccine (1) Never done    Colon cancer screen fecal DNA test (Cologuard)  09/10/2022    Breast cancer screen  10/19/2022  Lipid screen  08/24/2026    DEXA (modify frequency per FRAX score)  Completed    Pneumococcal 65+ yrs at Risk Vaccine  Completed    Hepatitis A vaccine  Aged Out    Hepatitis B vaccine  Aged Out    Hib vaccine  Aged Out    Meningococcal (ACWY) vaccine  Aged Out       Subjective:     Review of Systems   Constitutional: Negative for chills and fever. HENT: Negative for congestion. Respiratory: Negative for cough, chest tightness and shortness of breath. Cardiovascular: Negative for chest pain, palpitations and leg swelling. Gastrointestinal: Negative for abdominal pain, anal bleeding, constipation, diarrhea and nausea. Genitourinary: Negative for difficulty urinating. Psychiatric/Behavioral: Negative. See HPI for visit specific review of symptoms. All others negative      Objective:   /72   Pulse 71   Temp 96.9 °F (36.1 °C)   Ht 5' 2\" (1.575 m)   Wt 120 lb (54.4 kg)   SpO2 97%   BMI 21.95 kg/m²    Physical Exam  Constitutional:       Appearance: She is well-developed. She is not ill-appearing. Eyes:      Pupils: Pupils are equal, round, and reactive to light. Cardiovascular:      Rate and Rhythm: Normal rate and regular rhythm. Heart sounds: No murmur heard. No friction rub. Pulmonary:      Effort: Pulmonary effort is normal. No respiratory distress. Breath sounds: Normal breath sounds. No wheezing or rales. Abdominal:      General: Bowel sounds are normal. There is no distension. Palpations: Abdomen is soft. Tenderness: There is no abdominal tenderness. There is no guarding or rebound. Musculoskeletal:      Cervical back: Normal range of motion and neck supple. Neurological:      Mental Status: She is alert.    Psychiatric:         Behavior: Behavior normal.           Lab Review   Recent Results (from the past 672 hour(s))   Vitamin D 25 Hydroxy    Collection Time: 08/24/21  7:58 AM   Result Value Ref Range    Vit D, 25-Hydroxy 21.4 (L) screening mammogram for breast cancer  -     [facility] DIGITAL SCREEN W OR WO CAD BILATERAL; Future    Mixed hyperlipidemia  Will continue lifestyle modification. Multiple myeloma, remission status unspecified (HCC)  Stable, followed by Oncology. Routine general medical examination at a health care facility    Anxiety  Stable, continue xanax when needed. SE profile discussed. Hypercalcemia  Stable, followed by Heme. Bilateral carotid artery stenosis  -     US CAROTID ARTERY BILATERAL; Future  Refer for carotid US. Vitamin D deficiency  Continue vit D, will follow. Calcium stable. Return for Medicare Annual Wellness Visit in 1 year. Discussed use, benefit, and side effects of prescribed medications. All patient questions answered. Pt voiced understanding. Reviewed health maintenance. Instructed to continue current medications, diet and exercise. Patient agreedwith treatment plan. Follow up as directed.

## 2021-09-23 ENCOUNTER — HOSPITAL ENCOUNTER (OUTPATIENT)
Dept: INFUSION THERAPY | Age: 71
Discharge: HOME | End: 2021-09-23
Payer: MEDICARE

## 2021-09-23 DIAGNOSIS — D47.2 SMOLDERING MULTIPLE MYELOMA: ICD-10-CM

## 2021-09-23 LAB
ALBUMIN SERPL-MCNC: 4.3 G/DL (ref 3.5–5.2)
ALP BLD-CCNC: 88 U/L (ref 35–104)
ALT SERPL-CCNC: 36 U/L (ref 9–52)
ANION GAP SERPL CALCULATED.3IONS-SCNC: 7 MMOL/L (ref 7–19)
AST SERPL-CCNC: 33 U/L (ref 14–36)
BILIRUB SERPL-MCNC: 0.7 MG/DL (ref 0.2–1.3)
BUN BLDV-MCNC: 15 MG/DL (ref 7–17)
CALCIUM SERPL-MCNC: 10.4 MG/DL (ref 8.4–10.2)
CHLORIDE BLD-SCNC: 101 MMOL/L (ref 98–111)
CO2: 30 MMOL/L (ref 22–29)
CREAT SERPL-MCNC: 0.7 MG/DL (ref 0.5–1)
GFR NON-AFRICAN AMERICAN: >60
GLOBULIN: 3.8 G/DL
GLUCOSE BLD-MCNC: 106 MG/DL (ref 74–106)
POTASSIUM SERPL-SCNC: 5.3 MMOL/L (ref 3.5–5.1)
SODIUM BLD-SCNC: 138 MMOL/L (ref 137–145)
TOTAL PROTEIN: 8.1 G/DL (ref 6.3–8.2)

## 2021-09-23 PROCEDURE — 80053 COMPREHEN METABOLIC PANEL: CPT

## 2021-09-30 ENCOUNTER — HOSPITAL ENCOUNTER (OUTPATIENT)
Dept: INFUSION THERAPY | Age: 71
End: 2021-09-30
Payer: MEDICARE

## 2021-10-13 DIAGNOSIS — I65.23 BILATERAL CAROTID ARTERY STENOSIS: ICD-10-CM

## 2021-12-21 ENCOUNTER — OFFICE VISIT (OUTPATIENT)
Dept: PRIMARY CARE CLINIC | Age: 71
End: 2021-12-21

## 2021-12-21 DIAGNOSIS — Z11.52 ENCOUNTER FOR SCREENING FOR COVID-19: Primary | ICD-10-CM

## 2021-12-21 LAB — SARS-COV-2, PCR: NOT DETECTED

## 2021-12-21 PROCEDURE — 99999 PR OFFICE/OUTPT VISIT,PROCEDURE ONLY: CPT | Performed by: NURSE PRACTITIONER

## 2022-01-07 ENCOUNTER — OFFICE VISIT (OUTPATIENT)
Dept: PRIMARY CARE CLINIC | Age: 72
End: 2022-01-07

## 2022-01-07 DIAGNOSIS — Z11.52 ENCOUNTER FOR SCREENING FOR COVID-19: Primary | ICD-10-CM

## 2022-01-07 LAB — SARS-COV-2, PCR: DETECTED

## 2022-01-07 PROCEDURE — 99999 PR OFFICE/OUTPT VISIT,PROCEDURE ONLY: CPT | Performed by: NURSE PRACTITIONER

## 2022-02-28 ENCOUNTER — NURSE TRIAGE (OUTPATIENT)
Dept: OTHER | Facility: CLINIC | Age: 72
End: 2022-02-28

## 2022-02-28 NOTE — TELEPHONE ENCOUNTER
Received call from Leroy Steward at [facility] with [employer]. Subjective: Caller states \"I feel tired all the time and worse over the last few months\"     Current Symptoms: Fatigue    Denies fever, weakness, sob, chest pain. Onset: 3 year ago; worsening    Associated Symptoms: increased sleepiness    Pain Severity: denies pain    Temperature: cdenies na    What has been tried: na    LMP: NA Pregnant: NA    Recommended disposition: See PCP within 3 Days    Care advice provided, patient verbalizes understanding; denies any other questions or concerns; instructed to call back for any new or worsening symptoms. Patient/Caller agrees with recommended disposition; writer provided warm transfer to  at [facility] for appointment scheduling     Attention Provider: Thank you for allowing me to participate in the care of your patient. The patient was connected to triage in response to information provided to the ECC/PSC. Please do not respond through this encounter as the response is not directed to a shared pool.       Reason for Disposition   Fatigue (i.e., tires easily, decreased energy) and persists > 1 week    Protocols used: WEAKNESS (GENERALIZED) AND FATIGUE-ADULT-OH

## 2022-03-02 ENCOUNTER — OFFICE VISIT (OUTPATIENT)
Dept: FAMILY MEDICINE CLINIC | Age: 72
End: 2022-03-02
Payer: MEDICARE

## 2022-03-02 VITALS
DIASTOLIC BLOOD PRESSURE: 78 MMHG | OXYGEN SATURATION: 99 % | BODY MASS INDEX: 22.26 KG/M2 | SYSTOLIC BLOOD PRESSURE: 130 MMHG | HEART RATE: 89 BPM | TEMPERATURE: 98.6 F | HEIGHT: 62 IN | WEIGHT: 121 LBS | RESPIRATION RATE: 18 BRPM

## 2022-03-02 DIAGNOSIS — C90.00 MULTIPLE MYELOMA, REMISSION STATUS UNSPECIFIED (HCC): ICD-10-CM

## 2022-03-02 DIAGNOSIS — R06.02 SHORTNESS OF BREATH: ICD-10-CM

## 2022-03-02 DIAGNOSIS — E55.9 VITAMIN D DEFICIENCY: ICD-10-CM

## 2022-03-02 DIAGNOSIS — R53.83 FATIGUE, UNSPECIFIED TYPE: ICD-10-CM

## 2022-03-02 DIAGNOSIS — M81.0 AGE-RELATED OSTEOPOROSIS WITHOUT CURRENT PATHOLOGICAL FRACTURE: ICD-10-CM

## 2022-03-02 DIAGNOSIS — R53.83 FATIGUE, UNSPECIFIED TYPE: Primary | ICD-10-CM

## 2022-03-02 LAB
ALBUMIN SERPL-MCNC: 4.3 G/DL (ref 3.5–5.2)
ALP BLD-CCNC: 76 U/L (ref 35–104)
ALT SERPL-CCNC: 28 U/L (ref 5–33)
ANION GAP SERPL CALCULATED.3IONS-SCNC: 12 MMOL/L (ref 7–19)
AST SERPL-CCNC: 23 U/L (ref 5–32)
BASOPHILS ABSOLUTE: 0.1 K/UL (ref 0–0.2)
BASOPHILS RELATIVE PERCENT: 1 % (ref 0–1)
BILIRUB SERPL-MCNC: 0.5 MG/DL (ref 0.2–1.2)
BUN BLDV-MCNC: 11 MG/DL (ref 8–23)
CALCIUM SERPL-MCNC: 10.2 MG/DL (ref 8.8–10.2)
CHLORIDE BLD-SCNC: 102 MMOL/L (ref 98–111)
CO2: 26 MMOL/L (ref 22–29)
CREAT SERPL-MCNC: 0.6 MG/DL (ref 0.5–0.9)
EOSINOPHILS ABSOLUTE: 0.2 K/UL (ref 0–0.6)
EOSINOPHILS RELATIVE PERCENT: 2.9 % (ref 0–5)
FERRITIN: 135.4 NG/ML (ref 13–150)
GFR AFRICAN AMERICAN: >59
GFR NON-AFRICAN AMERICAN: >60
GLUCOSE BLD-MCNC: 102 MG/DL (ref 74–109)
HCT VFR BLD CALC: 38.1 % (ref 37–47)
HEMOGLOBIN: 12.7 G/DL (ref 12–16)
IMMATURE GRANULOCYTES #: 0 K/UL
IRON SATURATION: 35 % (ref 14–50)
IRON: 90 UG/DL (ref 37–145)
LYMPHOCYTES ABSOLUTE: 1.6 K/UL (ref 1.1–4.5)
LYMPHOCYTES RELATIVE PERCENT: 31 % (ref 20–40)
MCH RBC QN AUTO: 31.7 PG (ref 27–31)
MCHC RBC AUTO-ENTMCNC: 33.3 G/DL (ref 33–37)
MCV RBC AUTO: 95 FL (ref 81–99)
MONOCYTES ABSOLUTE: 0.4 K/UL (ref 0–0.9)
MONOCYTES RELATIVE PERCENT: 8 % (ref 0–10)
NEUTROPHILS ABSOLUTE: 3 K/UL (ref 1.5–7.5)
NEUTROPHILS RELATIVE PERCENT: 56.9 % (ref 50–65)
PDW BLD-RTO: 12.3 % (ref 11.5–14.5)
PLATELET # BLD: 329 K/UL (ref 130–400)
PMV BLD AUTO: 10.1 FL (ref 9.4–12.3)
POTASSIUM SERPL-SCNC: 4.4 MMOL/L (ref 3.5–5)
RBC # BLD: 4.01 M/UL (ref 4.2–5.4)
SODIUM BLD-SCNC: 140 MMOL/L (ref 136–145)
T4 FREE: 0.93 NG/DL (ref 0.93–1.7)
TOTAL IRON BINDING CAPACITY: 254 UG/DL (ref 250–400)
TOTAL PROTEIN: 8.6 G/DL (ref 6.6–8.7)
TSH SERPL DL<=0.05 MIU/L-ACNC: 2.07 UIU/ML (ref 0.27–4.2)
VITAMIN B-12: 919 PG/ML (ref 211–946)
VITAMIN D 25-HYDROXY: 21.1 NG/ML
WBC # BLD: 5.3 K/UL (ref 4.8–10.8)

## 2022-03-02 PROCEDURE — 1036F TOBACCO NON-USER: CPT | Performed by: NURSE PRACTITIONER

## 2022-03-02 PROCEDURE — 1123F ACP DISCUSS/DSCN MKR DOCD: CPT | Performed by: NURSE PRACTITIONER

## 2022-03-02 PROCEDURE — G8420 CALC BMI NORM PARAMETERS: HCPCS | Performed by: NURSE PRACTITIONER

## 2022-03-02 PROCEDURE — G8484 FLU IMMUNIZE NO ADMIN: HCPCS | Performed by: NURSE PRACTITIONER

## 2022-03-02 PROCEDURE — G8399 PT W/DXA RESULTS DOCUMENT: HCPCS | Performed by: NURSE PRACTITIONER

## 2022-03-02 PROCEDURE — G8427 DOCREV CUR MEDS BY ELIG CLIN: HCPCS | Performed by: NURSE PRACTITIONER

## 2022-03-02 PROCEDURE — 1090F PRES/ABSN URINE INCON ASSESS: CPT | Performed by: NURSE PRACTITIONER

## 2022-03-02 PROCEDURE — 4040F PNEUMOC VAC/ADMIN/RCVD: CPT | Performed by: NURSE PRACTITIONER

## 2022-03-02 PROCEDURE — 99214 OFFICE O/P EST MOD 30 MIN: CPT | Performed by: NURSE PRACTITIONER

## 2022-03-02 PROCEDURE — 3017F COLORECTAL CA SCREEN DOC REV: CPT | Performed by: NURSE PRACTITIONER

## 2022-03-02 RX ORDER — ALPRAZOLAM 0.5 MG/1
0.5 TABLET ORAL 2 TIMES DAILY PRN
COMMUNITY
End: 2022-06-08

## 2022-03-02 ASSESSMENT — ENCOUNTER SYMPTOMS
CHEST TIGHTNESS: 0
WHEEZING: 0
DIARRHEA: 0
SHORTNESS OF BREATH: 1
SORE THROAT: 0
ABDOMINAL PAIN: 0
NAUSEA: 0
COUGH: 0

## 2022-03-02 NOTE — PROGRESS NOTES
Sonja Cuenca is a 73 y.o. female who presents today for  Chief Complaint   Patient presents with    Fatigue       HPI:  She has had progressive fatigue for several months or longer but worse over the past couple months. She feels tired with daily activities, normal chores. She has had mild shortness of breath with exertion. None at rest.  No chest pain or pressure. She had Covid in January but no significant respiratory symptoms with that. She has had no cough. No recent fever or chills. No dysuria or frequency. She has occasional constipation but no GI symptoms otherwise. She has had some throat discomfort but not like a typical sore throat. Has hx of multiple myeloma followed by oncology which has been stable. Her last normal follow-up was canceled but was never rescheduled. She has a hx of osteoporosis and vitamin D deficiency. She takes calcium 1200 mg, vitamin D 1000 units daily. She has difficulty with medications and has declined bisphosphonates or other osteoporosis meds in the past.    Review of Systems   Constitutional: Positive for fatigue. HENT: Negative for congestion, ear pain and sore throat. Respiratory: Positive for shortness of breath. Negative for cough, chest tightness and wheezing. Cardiovascular: Negative for chest pain. Gastrointestinal: Negative for abdominal pain, diarrhea and nausea. Musculoskeletal: Negative for arthralgias and myalgias. Skin: Negative for rash. Neurological: Negative for dizziness and light-headedness. Past Medical History:   Diagnosis Date    Anxiety     Elevated blood pressure reading     Elevated serum protein level     GERD (gastroesophageal reflux disease)     History of multiple myeloma     Hypercalcemia     Hyperlipidemia     Monoclonal gammopathy        Current Outpatient Medications   Medication Sig Dispense Refill    ALPRAZolam (XANAX) 0.5 MG tablet Take 0.5 mg by mouth 2 times daily as needed.       vitamin B-12 (CYANOCOBALAMIN) 100 MCG tablet Take 50 mcg by mouth daily      docusate sodium (COLACE) 100 MG capsule Take 100 mg by mouth      vitamin D (CHOLECALCIFEROL) 1000 UNIT TABS tablet Take 2,000 Units by mouth daily       Ascorbic Acid (VITAMIN C) 500 MG CAPS Take 1 tablet by mouth daily.  aspirin 81 MG tablet Take 81 mg by mouth every 7 days       melatonin 3 MG TABS tablet Take 3 mg by mouth nightly.  L-LYSINE PO Take 1 tablet by mouth daily        No current facility-administered medications for this visit. Allergies   Allergen Reactions    Bentyl [Dicyclomine Hcl]      Makes her feel disoriented    Darvocet A500 [Propoxyphene N-Acetaminophen]     Propoxyphene     Sertraline Hcl Other (See Comments)    Albuterol Palpitations    Clarithromycin Palpitations    Lortab [Hydrocodone-Acetaminophen] Nausea Only and Nausea And Vomiting    Penicillins Nausea Only and Nausea And Vomiting     All \"Cillins\"       Past Surgical History:   Procedure Laterality Date    HYSTERECTOMY      TIBIA FRACTURE SURGERY Left     with pradeep in lower left leg       Social History     Tobacco Use    Smoking status: Never Smoker    Smokeless tobacco: Never Used   Vaping Use    Vaping Use: Never used   Substance Use Topics    Alcohol use: No    Drug use: No       Family History   Problem Relation Age of Onset    High Blood Pressure Mother     Cancer Father     High Cholesterol Brother        /78   Pulse 89   Temp 98.6 °F (37 °C) (Temporal)   Resp 18   Ht 5' 2\" (1.575 m)   Wt 121 lb (54.9 kg)   SpO2 99%   BMI 22.13 kg/m²     Physical Exam  Vitals reviewed. Constitutional:       General: She is not in acute distress. Appearance: Normal appearance. She is well-developed. HENT:      Head: Normocephalic.       Right Ear: Tympanic membrane and external ear normal.      Left Ear: Tympanic membrane and external ear normal.      Nose: Nose normal.      Mouth/Throat:      Mouth: Mucous membranes are moist.      Pharynx: No oropharyngeal exudate or posterior oropharyngeal erythema. Comments: Postpharyngeal cobblestoning with mild erythema, no exudate  Eyes:      Conjunctiva/sclera: Conjunctivae normal.      Pupils: Pupils are equal, round, and reactive to light. Neck:      Thyroid: No thyromegaly. Vascular: No carotid bruit or JVD. Trachea: No tracheal deviation. Cardiovascular:      Rate and Rhythm: Normal rate and regular rhythm. Heart sounds: Normal heart sounds. No murmur heard. Pulmonary:      Effort: Pulmonary effort is normal. No respiratory distress. Breath sounds: Normal breath sounds. No wheezing or rhonchi. Musculoskeletal:         General: Normal range of motion. Cervical back: Normal range of motion and neck supple. Lymphadenopathy:      Cervical: No cervical adenopathy. Skin:     General: Skin is warm and dry. Findings: No rash. Neurological:      Mental Status: She is alert. Psychiatric:         Mood and Affect: Mood normal.         Behavior: Behavior normal.         Thought Content: Thought content normal.         ASSESSMENT/PLAN:  1. Fatigue, unspecified type  -Check labs as outlined below. Further work-up pending results and if symptoms worsen or are not improving.  - CBC with Auto Differential; Future  - Comprehensive Metabolic Panel; Future  - Ferritin; Future  - Iron and TIBC; Future  - Vitamin B12; Future  - Vitamin D 25 Hydroxy; Future  - TSH; Future  - T4, Free; Future    2. Shortness of breath  -Lab as above  -CXR  -EKG  - XR CHEST (2 VW); Future  - EKG 12 Lead; Future    3. [facility]  -Encourage her to call and reschedule her follow-up with oncology. Has been stable. But needs regular follow-up with oncology to continue management. - Ferritin; Future  - Iron and TIBC; Future    4.  Age-related osteoporosis without current pathological fracture  -Stable, DEXA due later this year.  -Continue calcium, vitamin D    5. Vitamin D deficiency  -Check vitamin D with lab  - Vitamin D 25 Hydroxy; Future         Return for as scheduled. Pillo Cramer was seen today for fatigue. Diagnoses and all orders for this visit:    Fatigue, unspecified type  -     CBC with Auto Differential; Future  -     Comprehensive Metabolic Panel; Future  -     Ferritin; Future  -     Iron and TIBC; Future  -     Vitamin B12; Future  -     Vitamin D 25 Hydroxy; Future  -     TSH; Future  -     T4, Free; Future    Shortness of breath  -     XR CHEST (2 VW); Future  -     Cancel: EKG 12 Lead; Future  -     EKG 12 Lead; Future    Multiple myeloma, remission status unspecified (HCC)  -     Ferritin; Future  -     Iron and TIBC; Future    Age-related osteoporosis without current pathological fracture    Vitamin D deficiency  -     Vitamin D 25 Hydroxy; Future      There are no discontinued medications. There are no Patient Instructions on file for this visit. Patient voicesunderstanding and agrees to plans along with risks and benefits of plan. Counseling:  Pillo Francis's case, medications and options were discussed in detail. Patient was instructed to call the office if she questionsregarding her treatment. Should her conditions worsen, she should return to office to be reassessed by FIORELLA Gallo. she Should to go the closest Emergency Department for any emergency. They verbalizedunderstanding the above instructions. Return for as scheduled.

## 2022-03-04 ENCOUNTER — HOSPITAL ENCOUNTER (OUTPATIENT)
Dept: GENERAL RADIOLOGY | Age: 72
Discharge: HOME | End: 2022-03-04
Payer: MEDICARE

## 2022-03-04 ENCOUNTER — HOSPITAL ENCOUNTER (OUTPATIENT)
Dept: NON INVASIVE DIAGNOSTICS | Age: 72
Discharge: HOME | End: 2022-03-04
Payer: MEDICARE

## 2022-03-04 DIAGNOSIS — R06.02 SHORTNESS OF BREATH: ICD-10-CM

## 2022-03-04 DIAGNOSIS — R53.83 FATIGUE, UNSPECIFIED TYPE: Primary | ICD-10-CM

## 2022-03-04 DIAGNOSIS — R94.31 ST SEGMENT CHANGES ON ELECTROCARDIOGRAM: ICD-10-CM

## 2022-03-04 LAB
EKG P AXIS: 55 DEGREES
EKG P-R INTERVAL: 136 MS
EKG Q-T INTERVAL: 398 MS
EKG QRS DURATION: 82 MS
EKG QTC CALCULATION (BAZETT): 408 MS
EKG T AXIS: 45 DEGREES

## 2022-03-04 PROCEDURE — 71046 X-RAY EXAM CHEST 2 VIEWS: CPT

## 2022-03-04 PROCEDURE — 93010 ELECTROCARDIOGRAM REPORT: CPT | Performed by: INTERNAL MEDICINE

## 2022-03-04 PROCEDURE — 93005 ELECTROCARDIOGRAM TRACING: CPT

## 2022-03-07 ENCOUNTER — TELEMEDICINE (OUTPATIENT)
Dept: FAMILY MEDICINE CLINIC | Age: 72
End: 2022-03-07
Payer: MEDICARE

## 2022-03-07 DIAGNOSIS — R53.83 FATIGUE, UNSPECIFIED TYPE: ICD-10-CM

## 2022-03-07 DIAGNOSIS — R06.02 SHORTNESS OF BREATH: ICD-10-CM

## 2022-03-07 DIAGNOSIS — R09.81 CHRONIC NASAL CONGESTION: ICD-10-CM

## 2022-03-07 DIAGNOSIS — C90.00 MULTIPLE MYELOMA, REMISSION STATUS UNSPECIFIED (HCC): Primary | ICD-10-CM

## 2022-03-07 PROCEDURE — 99441 PR PHYS/QHP TELEPHONE EVALUATION 5-10 MIN: CPT | Performed by: FAMILY MEDICINE

## 2022-03-07 NOTE — PROGRESS NOTES
Diane Barger is a 73 y.o. female evaluated via telephone on 3/7/2022. Consent:  She and/or health care decision maker is aware that that she may receive a bill for this telephone service, which includes applicable co-pays, depending on her insurance coverage, and has provided verbal consent to proceed. Documentation:  I communicated with the patient and/or health care decision maker about follow up. She has persistent fatigue, sob with exertion since 2019, has stress, not sleeping well. No chest pain. Labs all stable. She reports sob with exertion for the past few months. She has fatigue with exertion at times. Details of this discussion including any medical advice provided: PLAN:    Refer to ENT for chronic nasal congestion. Patient feels sob related to nasal congestion. Will check labs. I affirm this is a Patient Initiated Episode with a Patient who has not had a related appointment within my department in the past 7 days or scheduled within the next 24 hours. Patient identification was verified at the start of the visit: Yes    Total Time: minutes: 5-10 minutes    Diane Barger was evaluated through a synchronous (real-time) audio encounter. The patient was located at home in a state where the provider was licensed to provide care.     Note: not billable if this call serves to triage the patient into an appointment for the relevant concern      Astrid John MD

## 2022-03-14 PROBLEM — R09.81 CHRONIC NASAL CONGESTION: Status: ACTIVE | Noted: 2022-03-14

## 2022-03-22 ENCOUNTER — HOSPITAL ENCOUNTER (OUTPATIENT)
Dept: NON INVASIVE DIAGNOSTICS | Age: 72
Discharge: HOME | End: 2022-03-22
Payer: MEDICARE

## 2022-03-22 DIAGNOSIS — R06.02 SHORTNESS OF BREATH: ICD-10-CM

## 2022-03-22 DIAGNOSIS — R53.83 FATIGUE, UNSPECIFIED TYPE: ICD-10-CM

## 2022-03-22 DIAGNOSIS — R94.31 ST SEGMENT CHANGES ON ELECTROCARDIOGRAM: ICD-10-CM

## 2022-03-22 LAB
LV EF: 50 %
LVEF MODALITY: NORMAL

## 2022-03-22 PROCEDURE — 93350 STRESS TTE ONLY: CPT

## 2022-03-23 ENCOUNTER — TELEPHONE (OUTPATIENT)
Dept: FAMILY MEDICINE CLINIC | Age: 72
End: 2022-03-23

## 2022-03-23 ENCOUNTER — TELEMEDICINE (OUTPATIENT)
Dept: FAMILY MEDICINE CLINIC | Age: 72
End: 2022-03-23
Payer: MEDICARE

## 2022-03-23 DIAGNOSIS — I48.91 ATRIAL FIBRILLATION, NEW ONSET (HCC): Primary | ICD-10-CM

## 2022-03-23 DIAGNOSIS — R53.83 FATIGUE, UNSPECIFIED TYPE: ICD-10-CM

## 2022-03-23 PROCEDURE — 99441 PR PHYS/QHP TELEPHONE EVALUATION 5-10 MIN: CPT | Performed by: NURSE PRACTITIONER

## 2022-03-23 ASSESSMENT — ENCOUNTER SYMPTOMS
WHEEZING: 0
NAUSEA: 0
CHEST TIGHTNESS: 0
SHORTNESS OF BREATH: 0
COUGH: 0
ABDOMINAL PAIN: 0
DIARRHEA: 0
SORE THROAT: 0

## 2022-03-28 ENCOUNTER — OFFICE VISIT (OUTPATIENT)
Dept: ENT CLINIC | Age: 72
End: 2022-03-28
Payer: MEDICARE

## 2022-03-28 VITALS
DIASTOLIC BLOOD PRESSURE: 76 MMHG | HEIGHT: 62 IN | BODY MASS INDEX: 22.26 KG/M2 | WEIGHT: 121 LBS | SYSTOLIC BLOOD PRESSURE: 128 MMHG

## 2022-03-28 DIAGNOSIS — K21.9 LARYNGOPHARYNGEAL REFLUX (LPR): Primary | ICD-10-CM

## 2022-03-28 PROCEDURE — 31575 DIAGNOSTIC LARYNGOSCOPY: CPT | Performed by: OTOLARYNGOLOGY

## 2022-03-28 PROCEDURE — 3017F COLORECTAL CA SCREEN DOC REV: CPT | Performed by: OTOLARYNGOLOGY

## 2022-03-28 PROCEDURE — 4040F PNEUMOC VAC/ADMIN/RCVD: CPT | Performed by: OTOLARYNGOLOGY

## 2022-03-28 PROCEDURE — G8427 DOCREV CUR MEDS BY ELIG CLIN: HCPCS | Performed by: OTOLARYNGOLOGY

## 2022-03-28 PROCEDURE — G8484 FLU IMMUNIZE NO ADMIN: HCPCS | Performed by: OTOLARYNGOLOGY

## 2022-03-28 PROCEDURE — 99204 OFFICE O/P NEW MOD 45 MIN: CPT | Performed by: OTOLARYNGOLOGY

## 2022-03-28 PROCEDURE — G8420 CALC BMI NORM PARAMETERS: HCPCS | Performed by: OTOLARYNGOLOGY

## 2022-03-28 PROCEDURE — 1123F ACP DISCUSS/DSCN MKR DOCD: CPT | Performed by: OTOLARYNGOLOGY

## 2022-03-28 PROCEDURE — G8399 PT W/DXA RESULTS DOCUMENT: HCPCS | Performed by: OTOLARYNGOLOGY

## 2022-03-28 PROCEDURE — 1090F PRES/ABSN URINE INCON ASSESS: CPT | Performed by: OTOLARYNGOLOGY

## 2022-03-28 PROCEDURE — 1036F TOBACCO NON-USER: CPT | Performed by: OTOLARYNGOLOGY

## 2022-03-28 RX ORDER — FAMOTIDINE 20 MG/1
20 TABLET, FILM COATED ORAL 2 TIMES DAILY
Qty: 60 TABLET | Refills: 1 | Status: SHIPPED | OUTPATIENT
Start: 2022-03-28

## 2022-03-28 ASSESSMENT — ENCOUNTER SYMPTOMS
GASTROINTESTINAL NEGATIVE: 1
RESPIRATORY NEGATIVE: 1
ALLERGIC/IMMUNOLOGIC NEGATIVE: 1
EYES NEGATIVE: 1
TROUBLE SWALLOWING: 1

## 2022-03-28 NOTE — PROGRESS NOTES
3/28/2022    Jose Lopez (:  1950) is a 73 y.o. female, Established patient, here for evaluation of the following chief complaint(s):  New Patient (throat pain )      Vitals:    22 0940   BP: 128/76   Weight: 121 lb (54.9 kg)   Height: 5' 2\" (1.575 m)       Wt Readings from Last 3 Encounters:   22 121 lb (54.9 kg)   22 121 lb (54.9 kg)   21 120 lb (54.4 kg)       BP Readings from Last 3 Encounters:   22 128/76   22 130/78   21 132/72         SUBJECTIVE/OBJECTIVE:    New patient seen today for fullness in her throat. She says after going on for 4 months. She describes fullness with occasional pain but not often. She does suffer from significant allergies but she says that she has no significant drainage. Also occasional nasal congestion. She feels like she is a mouth breather at night because of her prior nasal congestion which resolved with nasal spray for allergies many years ago. She does get occasional reflux. Nothing significant. Denies fevers night sweats or weight loss but does report feeling somewhat tired. She does have an appointment with cardiology. Review of Systems   Constitutional: Negative. HENT: Positive for congestion and trouble swallowing. Eyes: Negative. Respiratory: Negative. Cardiovascular: Negative. Gastrointestinal: Negative. Endocrine: Negative. Musculoskeletal: Negative. Skin: Negative. Allergic/Immunologic: Negative. Neurological: Negative. Hematological: Negative. Psychiatric/Behavioral: Negative. Physical Exam  Constitutional:       Appearance: Normal appearance. She is normal weight. HENT:      Head: Normocephalic and atraumatic. Nose: Nose normal.      Mouth/Throat:      Mouth: Mucous membranes are moist.      Pharynx: Oropharynx is clear. Eyes:      Extraocular Movements: Extraocular movements intact. Pupils: Pupils are equal, round, and reactive to light. Cardiovascular:      Rate and Rhythm: Normal rate and regular rhythm. Pulmonary:      Effort: Pulmonary effort is normal.      Breath sounds: Normal breath sounds. Musculoskeletal:      Cervical back: Normal range of motion. Skin:     General: Skin is warm and dry. Neurological:      General: No focal deficit present. Mental Status: She is alert and oriented to person, place, and time. Psychiatric:         Mood and Affect: Mood normal.         Behavior: Behavior normal.          Procedure Note:    Flexible Laryngoscopy      Procedure : Flexible Laryngoscopy  Surgeon: Alejandro Madsen MD  Anesthesia: Afrin with 4% lidocaine  Indication: Laryngeal mirror examination was not tolerated due to gag reflex  Description:  After verbal consent was obtained, the scope was passed along the floor of the left naris to the level of the larynx. There was no evidence of concerning masses or lesions of the base of tongue, vallecula, epiglottis, aryepiglottic folds, arytenoids, false vocal folds, true vocal folds, or pyriform sinuses. True vocal folds exhibited symmetric motion bilaterally without evidence of paralysis or paresis. The scope was removed. The patient tolerated the procedure without difficulty. There were no complications. Pertinent findings: Mild edema of the arytenoid    ASSESSMENT/PLAN:    1. Laryngopharyngeal reflux (LPR)  This is quite possibly laryngopharyngeal reflux. We will go with twice a day reflux medication for the next month and see how this helps. Return in about 4 weeks (around 4/25/2022) for throat fullness. An electronic signature was used to authenticate this note. Alejandro Madsen MD       Please note that this chart was generated using dragon dictation software. Although every effort was made to ensure the accuracy of this automated transcription, some errors in transcription may have occurred.

## 2022-04-01 ENCOUNTER — OFFICE VISIT (OUTPATIENT)
Dept: CARDIOLOGY CLINIC | Age: 72
End: 2022-04-01
Payer: MEDICARE

## 2022-04-01 VITALS
HEIGHT: 62 IN | WEIGHT: 120 LBS | HEART RATE: 74 BPM | SYSTOLIC BLOOD PRESSURE: 140 MMHG | DIASTOLIC BLOOD PRESSURE: 70 MMHG | OXYGEN SATURATION: 100 % | BODY MASS INDEX: 22.08 KG/M2

## 2022-04-01 DIAGNOSIS — I48.91 NEW ONSET A-FIB (HCC): Primary | ICD-10-CM

## 2022-04-01 DIAGNOSIS — I48.0 PAROXYSMAL A-FIB (HCC): ICD-10-CM

## 2022-04-01 PROCEDURE — 1123F ACP DISCUSS/DSCN MKR DOCD: CPT | Performed by: INTERNAL MEDICINE

## 2022-04-01 PROCEDURE — 93000 ELECTROCARDIOGRAM COMPLETE: CPT | Performed by: INTERNAL MEDICINE

## 2022-04-01 PROCEDURE — G8399 PT W/DXA RESULTS DOCUMENT: HCPCS | Performed by: INTERNAL MEDICINE

## 2022-04-01 PROCEDURE — 1090F PRES/ABSN URINE INCON ASSESS: CPT | Performed by: INTERNAL MEDICINE

## 2022-04-01 PROCEDURE — 99204 OFFICE O/P NEW MOD 45 MIN: CPT | Performed by: INTERNAL MEDICINE

## 2022-04-01 PROCEDURE — 4040F PNEUMOC VAC/ADMIN/RCVD: CPT | Performed by: INTERNAL MEDICINE

## 2022-04-01 PROCEDURE — G8420 CALC BMI NORM PARAMETERS: HCPCS | Performed by: INTERNAL MEDICINE

## 2022-04-01 PROCEDURE — 1036F TOBACCO NON-USER: CPT | Performed by: INTERNAL MEDICINE

## 2022-04-01 PROCEDURE — G8427 DOCREV CUR MEDS BY ELIG CLIN: HCPCS | Performed by: INTERNAL MEDICINE

## 2022-04-01 PROCEDURE — 3017F COLORECTAL CA SCREEN DOC REV: CPT | Performed by: INTERNAL MEDICINE

## 2022-04-01 RX ORDER — ASPIRIN 81 MG/1
81 TABLET ORAL DAILY
Qty: 90 TABLET | Refills: 1 | Status: SHIPPED | OUTPATIENT
Start: 2022-04-01

## 2022-04-01 ASSESSMENT — ENCOUNTER SYMPTOMS
BLOOD IN STOOL: 0
EYE PAIN: 0
CONSTIPATION: 0
FACIAL SWELLING: 0
NAUSEA: 0
CHEST TIGHTNESS: 0
COUGH: 0
SHORTNESS OF BREATH: 0
DIARRHEA: 0
VOMITING: 0
SORE THROAT: 0
EYE DISCHARGE: 0
EYE REDNESS: 0
ABDOMINAL PAIN: 0
WHEEZING: 0
ABDOMINAL DISTENTION: 0
APNEA: 0

## 2022-04-01 NOTE — PROGRESS NOTES
Cardiology Office Visit Note  [facility]  [address]  Phone: [phone]  Fax: [phone]                            Date:  4/1/2022  Patient: Camille Jimenez  Age:  73 y.o., 1950    Referral: Devan Garcia, *    REASON FOR VISIT:  New Patient  Cardiac risk analysis      PROBLEM LIST:    Patient Active Problem List    Diagnosis Date Noted    Chronic nasal congestion 03/14/2022     Priority: Low    Vitamin D deficiency 09/12/2021     Priority: Low    Age-related osteoporosis without current pathological fracture 08/15/2019     Priority: Low    Multiple myeloma ([facility] [address] 03/05/2019     Priority: Low    Bilateral carotid artery stenosis 03/05/2019     Priority: Low    Anxiety 02/05/2018     Priority: Low    Elevated blood pressure reading without diagnosis of hypertension 02/05/2018     Priority: Low    Hyperproteinemia 02/05/2018     Priority: Low    Postmenopause 02/05/2018     Priority: Low    Coccyx pain 08/04/2017     Priority: Low    Hyperlipidemia      Priority: Low    Hypercalcemia      Priority: Low    Elevated serum protein level      Priority: Low         PRESENTATION: Camille Jimenez is a 73y.o. year old female and in cardiology consultation today for further evaluation of atypical neck pain. Patient reports that over the past 4 months she has been having pain in her throat and neck. This is almost constant. No associated pain on swallowing. No fever or chills. She was evaluated by ENT and was assessed as having likely laryngopharyngeal reflux. He has not yet started famotidine which was recommended. She is known to have peripheral vascular disease with evidence for mild carotid artery plaque 9. No history of established coronary artery disease. No history of myocardial infarction, congestive heart failure or diabetes mellitus. She had a recent stress echocardiogram which was notable for fair exercise capacity with no evidence for ischemia. The test however was significant for frequent PACs and intermittent atrial fibrillation as well as an exaggerated blood pressure response to peak stress, 199/87. Most recent labs largely unremarkable including hemoglobin, electrolytes, creatinine and free T4. EKG today largely unremarkable showing sinus rhythm 73 bpm with nonspecific ST-T wave changes in the inferior lateral leads. Significantly no chest pain, shortness of breath, diaphoresis, palpitations or syncope. REVIEW OF SYSTEMS:  Review of Systems   Constitutional: Negative for chills, fatigue and fever. HENT: Negative for congestion, facial swelling, hearing loss and sore throat. Eyes: Negative for pain, discharge, redness and visual disturbance. Respiratory: Negative for apnea, cough, chest tightness, shortness of breath and wheezing. Cardiovascular: Negative for chest pain, palpitations and leg swelling. Gastrointestinal: Negative for abdominal distention, abdominal pain, blood in stool, constipation, diarrhea, nausea and vomiting. Endocrine: Negative for polydipsia, polyphagia and polyuria. Genitourinary: Negative for dysuria, flank pain, frequency and hematuria. Musculoskeletal: Negative for joint swelling, myalgias and neck pain. Skin: Negative for pallor and rash. Neurological: Negative for dizziness, syncope, speech difficulty, light-headedness, numbness and headaches. Psychiatric/Behavioral: Negative for confusion, hallucinations and sleep disturbance.        Past Medical History:      Diagnosis Date    Anxiety     Elevated blood pressure reading     Elevated serum protein level     GERD (gastroesophageal reflux disease)     History of multiple myeloma     Hypercalcemia     Hyperlipidemia     Monoclonal gammopathy        Past Surgical History:      Procedure Laterality Date    HYSTERECTOMY      TIBIA FRACTURE SURGERY Left     with pradeep in lower left leg       Medications:  Current Outpatient Medications Medication Sig Dispense Refill    aspirin EC 81 MG EC tablet Take 1 tablet by mouth daily 90 tablet 1    famotidine (PEPCID) 20 MG tablet Take 1 tablet by mouth 2 times daily 60 tablet 1    ALPRAZolam (XANAX) 0.5 MG tablet Take 0.5 mg by mouth 2 times daily as needed.  vitamin B-12 (CYANOCOBALAMIN) 100 MCG tablet Take 50 mcg by mouth daily      docusate sodium (COLACE) 100 MG capsule Take 100 mg by mouth      vitamin D (CHOLECALCIFEROL) 1000 UNIT TABS tablet Take 2,000 Units by mouth daily       Ascorbic Acid (VITAMIN C) 500 MG CAPS Take 1 tablet by mouth daily.  melatonin 3 MG TABS tablet Take 3 mg by mouth nightly.  L-LYSINE PO Take 1 tablet by mouth daily        No current facility-administered medications for this visit. Allergies:  Bentyl [dicyclomine hcl], Darvocet a500 [propoxyphene n-acetaminophen], Propoxyphene, Sertraline hcl, Albuterol, Clarithromycin, Lortab [hydrocodone-acetaminophen], and Penicillins    Social History:  Social History     Occupational History    Not on file   Tobacco Use    Smoking status: Never Smoker    Smokeless tobacco: Never Used   Vaping Use    Vaping Use: Never used   Substance and Sexual Activity    Alcohol use: No    Drug use: No    Sexual activity: Not on file         Family History:       Problem Relation Age of Onset    High Blood Pressure Mother     Cancer Father     High Cholesterol Brother          Physical Examination:  BP (!) 140/70   Pulse 74   Ht 5' 2\" (1.575 m)   Wt 120 lb (54.4 kg)   SpO2 100%   BMI 21.95 kg/m²   Physical Exam  Vitals reviewed. Constitutional:       General: She is not in acute distress. Appearance: Normal appearance. She is not ill-appearing, toxic-appearing or diaphoretic. HENT:      Head: Normocephalic and atraumatic. Eyes:      General: No scleral icterus. Right eye: No discharge. Left eye: No discharge.       Conjunctiva/sclera: Conjunctivae normal.   Neck:      Vascular: No carotid bruit. Cardiovascular:      Rate and Rhythm: Normal rate. Rhythm irregularly irregular. No extrasystoles are present. Chest Wall: PMI is not displaced. No thrill. Heart sounds: S1 normal and S2 normal. No murmur heard. No friction rub. No gallop. Pulmonary:      Effort: Pulmonary effort is normal. No tachypnea or respiratory distress. Breath sounds: Normal breath sounds. No stridor. No wheezing, rhonchi or rales. Chest:      Chest wall: No tenderness. Abdominal:      General: Bowel sounds are normal. There is no distension. Palpations: Abdomen is soft. There is no mass. Tenderness: There is no abdominal tenderness. There is no guarding. Musculoskeletal:         General: No swelling. Cervical back: Normal range of motion and neck supple. No rigidity. Right lower leg: No edema. Left lower leg: No edema. Skin:     General: Skin is warm and dry. Coloration: Skin is not jaundiced. Findings: No erythema or rash. Neurological:      General: No focal deficit present. Mental Status: She is alert and oriented to person, place, and time. Mental status is at baseline. Psychiatric:         Mood and Affect: Mood normal.         Behavior: Behavior normal.         Thought Content:  Thought content normal.           Labs:   CBC: No results found for: CBC   BMP: No results found for: BMP    BNP: No results found for: BNPINT   PT/INR: No results found for: PROTIME, INR, INR    APTT:  No results found for: APTT   CARDIAC ENZYMES: No results found for: CKTOTAL, CKMB, CKMBINDEX, TROPONINI   LIPID PANEL: No results found for: LIPIDPAN  LIVER PROFILE:   AST   Date Value Ref Range Status   03/02/2022 23 5 - 32 U/L Final     ALT   Date Value Ref Range Status   03/02/2022 28 5 - 33 U/L Final     Albumin   Date Value Ref Range Status   03/02/2022 4.3 3.5 - 5.2 g/dL Final                ASSESSMENT and PLAN:    Atypical neck pain, doubt cardiac

## 2022-04-14 ENCOUNTER — TELEPHONE (OUTPATIENT)
Dept: CARDIOLOGY CLINIC | Age: 72
End: 2022-04-14

## 2022-04-14 NOTE — TELEPHONE ENCOUNTER
Patient would like to do her Echo at [facility]. Please cancel appointment here and send orders to [facility] to have completed.

## 2022-04-26 LAB
LEFT VENTRICULAR EJECTION FRACTION HIGH VALUE: NORMAL
LEFT VENTRICULAR EJECTION FRACTION MODE: NORMAL
LV EF: NORMAL %

## 2022-04-27 DIAGNOSIS — I48.0 PAROXYSMAL A-FIB (HCC): ICD-10-CM

## 2022-04-27 DIAGNOSIS — I48.91 NEW ONSET A-FIB (HCC): ICD-10-CM

## 2022-04-29 ENCOUNTER — TELEPHONE (OUTPATIENT)
Dept: CARDIOLOGY CLINIC | Age: 72
End: 2022-04-29

## 2022-04-29 NOTE — TELEPHONE ENCOUNTER
Left message for patient to call back about results. Patient has appointment on Friday May 6, 2022.    ----- Message from Wilber Ocampo MD sent at 4/29/2022  7:50 AM CDT -----  Echocardiogram is reassuring documenting structurally normal heart. Follow-up as planned.

## 2022-05-02 ENCOUNTER — TELEPHONE (OUTPATIENT)
Dept: CARDIOLOGY CLINIC | Age: 72
End: 2022-05-02

## 2022-05-02 NOTE — TELEPHONE ENCOUNTER
----- Message from Mark Rendon MD sent at 4/29/2022  7:50 AM CDT -----  Echocardiogram is reassuring documenting structurally normal heart. Follow-up as planned.

## 2022-05-13 ENCOUNTER — OFFICE VISIT (OUTPATIENT)
Dept: CARDIOLOGY CLINIC | Age: 72
End: 2022-05-13
Payer: MEDICARE

## 2022-05-13 VITALS
SYSTOLIC BLOOD PRESSURE: 126 MMHG | DIASTOLIC BLOOD PRESSURE: 84 MMHG | HEART RATE: 65 BPM | WEIGHT: 122 LBS | BODY MASS INDEX: 22.45 KG/M2 | HEIGHT: 62 IN

## 2022-05-13 DIAGNOSIS — E78.5 DYSLIPIDEMIA: ICD-10-CM

## 2022-05-13 DIAGNOSIS — I48.0 PAROXYSMAL A-FIB (HCC): Primary | ICD-10-CM

## 2022-05-13 PROCEDURE — 4040F PNEUMOC VAC/ADMIN/RCVD: CPT | Performed by: INTERNAL MEDICINE

## 2022-05-13 PROCEDURE — G8420 CALC BMI NORM PARAMETERS: HCPCS | Performed by: INTERNAL MEDICINE

## 2022-05-13 PROCEDURE — 93000 ELECTROCARDIOGRAM COMPLETE: CPT | Performed by: INTERNAL MEDICINE

## 2022-05-13 PROCEDURE — 1123F ACP DISCUSS/DSCN MKR DOCD: CPT | Performed by: INTERNAL MEDICINE

## 2022-05-13 PROCEDURE — G8399 PT W/DXA RESULTS DOCUMENT: HCPCS | Performed by: INTERNAL MEDICINE

## 2022-05-13 PROCEDURE — 99213 OFFICE O/P EST LOW 20 MIN: CPT | Performed by: INTERNAL MEDICINE

## 2022-05-13 PROCEDURE — G8427 DOCREV CUR MEDS BY ELIG CLIN: HCPCS | Performed by: INTERNAL MEDICINE

## 2022-05-13 PROCEDURE — 3017F COLORECTAL CA SCREEN DOC REV: CPT | Performed by: INTERNAL MEDICINE

## 2022-05-13 PROCEDURE — 1036F TOBACCO NON-USER: CPT | Performed by: INTERNAL MEDICINE

## 2022-05-13 PROCEDURE — 1090F PRES/ABSN URINE INCON ASSESS: CPT | Performed by: INTERNAL MEDICINE

## 2022-05-13 RX ORDER — ATORVASTATIN CALCIUM 40 MG/1
40 TABLET, FILM COATED ORAL DAILY
Qty: 90 TABLET | Refills: 2 | Status: SHIPPED | OUTPATIENT
Start: 2022-05-13 | End: 2022-10-13

## 2022-05-13 ASSESSMENT — ENCOUNTER SYMPTOMS
ABDOMINAL PAIN: 0
ABDOMINAL DISTENTION: 0
DIARRHEA: 0
COUGH: 0
CHEST TIGHTNESS: 0
FACIAL SWELLING: 0
WHEEZING: 0
BLOOD IN STOOL: 0
VOMITING: 0
CONSTIPATION: 0
NAUSEA: 0
EYE DISCHARGE: 0
EYE PAIN: 0
SORE THROAT: 0
EYE REDNESS: 0
APNEA: 0
SHORTNESS OF BREATH: 0

## 2022-05-13 NOTE — PROGRESS NOTES
Cardiology Office Visit Note  [address] [id number]  Phone: [phone]  Fax: [phone]                            Date:  5/13/2022  Patient: Ibeth Alexandra  Age:  73 y.o., 1950    Referral: No ref. provider found    REASON FOR VISIT:  Follow-up (New onset a-[facility]))         PROBLEM LIST:    Patient Active Problem List    Diagnosis Date Noted    Chronic nasal congestion 03/14/2022     Priority: Low    Vitamin D deficiency 09/12/2021     Priority: Low    Age-related osteoporosis without current pathological fracture 08/15/2019     Priority: Low    Multiple myeloma ([address] 03/05/2019     Priority: Low    Bilateral carotid artery stenosis 03/05/2019     Priority: Low    Anxiety 02/05/2018     Priority: Low    Elevated blood pressure reading without diagnosis of hypertension 02/05/2018     Priority: Low    Hyperproteinemia 02/05/2018     Priority: Low    Postmenopause 02/05/2018     Priority: Low    Coccyx pain 08/04/2017     Priority: Low    Hyperlipidemia      Priority: Low    Hypercalcemia      Priority: Low    Elevated serum protein level      Priority: Low         PRESENTATION: Ibeth Alexandra is a 73y.o. year old female is seen today for routine follow-up exam.  There is reasonable concern about her blood pressure at the last visit. However, home blood pressure monitoring has been favorable with normal documented blood pressures. Blood pressure in the office today also within normal limits. It is apparent at this time that instances of elevated blood pressure may well have been due to whitecoat hypertension. She reports that the atypical neck pain associated with laryngeal reflux has improved, now on to [facility] intermittently. She is to follow-up with her ENT doctor.   Cardiac evaluation remains reassuring including recent stress echocardiogram and 2D echocardiogram.  Notably however patient does have a documented history of intermittent atrial fibrillation however remains reluctant to start anticoagulants. REVIEW OF SYSTEMS:  Review of Systems   Constitutional: Negative for chills, fatigue and fever. HENT: Negative for congestion, facial swelling, hearing loss and sore throat. Eyes: Negative for pain, discharge, redness and visual disturbance. Respiratory: Negative for apnea, cough, chest tightness, shortness of breath and wheezing. Cardiovascular: Negative for chest pain, palpitations and leg swelling. Gastrointestinal: Negative for abdominal distention, abdominal pain, blood in stool, constipation, diarrhea, nausea and vomiting. Endocrine: Negative for polydipsia, polyphagia and polyuria. Genitourinary: Negative for dysuria, flank pain, frequency and hematuria. Musculoskeletal: Negative for joint swelling, myalgias and neck pain. Skin: Negative for pallor and rash. Neurological: Negative for dizziness, syncope, speech difficulty, light-headedness, numbness and headaches. Psychiatric/Behavioral: Negative for confusion, hallucinations and sleep disturbance. Past Medical History:      Diagnosis Date    Anxiety     Elevated blood pressure reading     Elevated serum protein level     GERD (gastroesophageal reflux disease)     History of multiple myeloma     Hypercalcemia     Hyperlipidemia     Monoclonal gammopathy        Past Surgical History:      Procedure Laterality Date    HYSTERECTOMY      TIBIA FRACTURE SURGERY Left     with pradeep in lower left leg       Medications:  Current Outpatient Medications   Medication Sig Dispense Refill    aspirin EC 81 MG EC tablet Take 1 tablet by mouth daily 90 tablet 1    ALPRAZolam (XANAX) 0.5 MG tablet Take 0.5 mg by mouth 2 times daily as needed.       vitamin B-12 (CYANOCOBALAMIN) 100 MCG tablet Take 50 mcg by mouth daily      docusate sodium (COLACE) 100 MG capsule Take 100 mg by mouth      vitamin D (CHOLECALCIFEROL) 1000 UNIT TABS tablet Take 2,000 Units by mouth daily       Ascorbic Acid (VITAMIN C) 500 MG CAPS Take 1 tablet by mouth daily.  melatonin 3 MG TABS tablet Take 3 mg by mouth nightly.  famotidine (PEPCID) 20 MG tablet Take 1 tablet by mouth 2 times daily (Patient not taking: Reported on 5/13/2022) 60 tablet 1    L-LYSINE PO Take 1 tablet by mouth daily  (Patient not taking: Reported on 5/13/2022)       No current facility-administered medications for this visit. Allergies:  Bentyl [dicyclomine hcl], Darvocet a500 [propoxyphene n-acetaminophen], Propoxyphene, Sertraline hcl, Albuterol, Clarithromycin, Lortab [hydrocodone-acetaminophen], and Penicillins    Social History:  Social History     Occupational History    Not on file   Tobacco Use    Smoking status: Never Smoker    Smokeless tobacco: Never Used   Vaping Use    Vaping Use: Never used   Substance and Sexual Activity    Alcohol use: No    Drug use: No    Sexual activity: Not on file         Family History:       Problem Relation Age of Onset    High Blood Pressure Mother     Cancer Father     High Cholesterol Brother          Physical Examination:  /84 (Site: Left Upper Arm, Position: Sitting)   Pulse 65   Ht 5' 2\" (1.575 m)   Wt 122 lb (55.3 kg)   BMI 22.31 kg/m²   Physical Exam  Vitals reviewed. Constitutional:       General: She is not in acute distress. Appearance: Normal appearance. She is not ill-appearing, toxic-appearing or diaphoretic. HENT:      Head: Normocephalic and atraumatic. Eyes:      General: No scleral icterus. Right eye: No discharge. Left eye: No discharge. Conjunctiva/sclera: Conjunctivae normal.   Neck:      Vascular: No carotid bruit. Cardiovascular:      Rate and Rhythm: Normal rate and regular rhythm. No extrasystoles are present. Chest Wall: PMI is not displaced. No thrill. Pulses:           Carotid pulses are 3+ on the right side and 3+ on the left side.        Radial pulses are 3+ on the right side and 3+ on the left side. Dorsalis pedis pulses are 3+ on the right side and 3+ on the left side. Posterior tibial pulses are 3+ on the right side and 3+ on the left side. Heart sounds: S1 normal and S2 normal. No murmur heard. No friction rub. No gallop. Pulmonary:      Effort: Pulmonary effort is normal. No tachypnea or respiratory distress. Breath sounds: Normal breath sounds. No stridor. No wheezing, rhonchi or rales. Chest:      Chest wall: No tenderness. Abdominal:      General: Bowel sounds are normal. There is no distension. Palpations: Abdomen is soft. There is no mass. Tenderness: There is no abdominal tenderness. There is no guarding. Musculoskeletal:         General: No swelling. Cervical back: Normal range of motion and neck supple. No rigidity. Right lower leg: No edema. Left lower leg: No edema. Skin:     General: Skin is warm and dry. Coloration: Skin is not jaundiced. Findings: No erythema or rash. Neurological:      General: No focal deficit present. Mental Status: She is alert and oriented to person, place, and time. Mental status is at baseline. Psychiatric:         Mood and Affect: Mood normal.         Behavior: Behavior normal.         Thought Content:  Thought content normal.           Labs:   CBC: No results found for: CBC   BMP: No results found for: BMP    BNP: No results found for: BNPINT   PT/INR: No results found for: PROTIME, INR, INR    APTT:  No results found for: APTT   CARDIAC ENZYMES: No results found for: CKTOTAL, CKMB, CKMBINDEX, TROPONINI   LIPID PANEL: No results found for: LIPIDPAN  LIVER PROFILE:   AST   Date Value Ref Range Status   03/02/2022 23 5 - 32 U/L Final     ALT   Date Value Ref Range Status   03/02/2022 28 5 - 33 U/L Final     Albumin   Date Value Ref Range Status   03/02/2022 4.3 3.5 - 5.2 g/dL Final                  ASSESSMENT and PLAN:    Atypical neck pain, improved-- non-cardiac     Whitecoat hypertension     Intermittent atrial fibrillation, recently diagnosis  Increased risk factors for thromboembolic phenomenon (peripheral vascular disease, age, and gender). She continues to decline antithrombotic therapy. Agreeable to baby aspirin only. She stated full and complete understanding of her increased risk of stroke.     Peripheral vascular disease, mild carotid artery plaque. No history of stroke. Would benefit from cholesterol reduction to optimal LDL level less than 70 if agreeable to statin therapy.       Overall stable from a cardiovascular standpoint. Continue current treatment plan. Return in about 6 months (around 11/13/2022). Electronically signed by Huma Sotelo MD on 5/13/2022 at 9:52 AM    Huma Sotelo MD, JENELLE, [facility]  Noninvasive Cardiology Consultant    This dictation was generated by voice recognition computer software. Although all attempts are made to edit the dictation for accuracy, there may be errors in the transcription that are not intended.

## 2022-06-08 DIAGNOSIS — F41.9 ANXIETY: Primary | ICD-10-CM

## 2022-06-08 RX ORDER — ALPRAZOLAM 0.5 MG/1
TABLET ORAL
Qty: 60 TABLET | Refills: 2 | Status: SHIPPED | OUTPATIENT
Start: 2022-06-08 | End: 2022-07-08

## 2022-06-08 NOTE — TELEPHONE ENCOUNTER
Ashlee Vann called to request a refill on her medication. Last office visit : 3/23/2022   Next office visit : 9/1/2022     Last UDS:   Amphetamine Screen, Urine   Date Value Ref Range Status   08/15/2019 neg  Final     Barbiturate Screen, Urine   Date Value Ref Range Status   08/15/2019 neg  Final     Benzodiazepine Screen, Urine   Date Value Ref Range Status   08/15/2019 positive  Final     Buprenorphine Urine   Date Value Ref Range Status   08/15/2019 neg  Final     Cocaine Metabolite Screen, Urine   Date Value Ref Range Status   08/15/2019 neg  Final     Gabapentin Screen, Urine   Date Value Ref Range Status   08/15/2019 neg  Final     MDMA, Urine   Date Value Ref Range Status   08/15/2019 neg  Final     Methamphetamine, Urine   Date Value Ref Range Status   08/15/2019 neg  Final     Opiate Scrn, Ur   Date Value Ref Range Status   08/15/2019 neg  Final     Oxycodone Screen, Ur   Date Value Ref Range Status   08/15/2019 neg  Final     PCP Screen, Urine   Date Value Ref Range Status   08/15/2019 neg  Final     Propoxyphene Screen, Urine   Date Value Ref Range Status   08/15/2019 neg  Final     THC Screen, Urine   Date Value Ref Range Status   08/15/2019 neg  Final     Tricyclic Antidepressants, Urine   Date Value Ref Range Status   08/15/2019 neg  Final       Last Tomeka Hamlin: today  Medication Contract: due   Last Fill: 11/18/21    Requested Prescriptions     Pending Prescriptions Disp Refills    ALPRAZolam (XANAX) 0.5 MG tablet [Pharmacy Med Name: ALPRAZOLAM 0.5 MG TABS 0.5 Tablet] 60 tablet 2     Sig: TAKE ONE TABLET BY MOUTH TWICE A DAY AS NEEDED FOR SLEEP OR ANXIETY ** MAY CAUSE DROWSINESS **         Please approve or refuse this medication.    Vahe Still MA

## 2022-09-27 ENCOUNTER — TELEPHONE (OUTPATIENT)
Dept: CARDIOLOGY CLINIC | Age: 72
End: 2022-09-27

## 2022-10-13 ENCOUNTER — OFFICE VISIT (OUTPATIENT)
Dept: FAMILY MEDICINE CLINIC | Age: 72
End: 2022-10-13
Payer: MEDICARE

## 2022-10-13 VITALS
HEIGHT: 63 IN | WEIGHT: 122.25 LBS | DIASTOLIC BLOOD PRESSURE: 90 MMHG | HEART RATE: 77 BPM | BODY MASS INDEX: 21.66 KG/M2 | SYSTOLIC BLOOD PRESSURE: 140 MMHG | OXYGEN SATURATION: 99 % | TEMPERATURE: 97.3 F

## 2022-10-13 DIAGNOSIS — M25.572 PAIN IN JOINTS OF BOTH FEET: ICD-10-CM

## 2022-10-13 DIAGNOSIS — E55.9 VITAMIN D DEFICIENCY: ICD-10-CM

## 2022-10-13 DIAGNOSIS — R03.0 ELEVATED BLOOD PRESSURE READING WITHOUT DIAGNOSIS OF HYPERTENSION: ICD-10-CM

## 2022-10-13 DIAGNOSIS — E78.2 MIXED HYPERLIPIDEMIA: ICD-10-CM

## 2022-10-13 DIAGNOSIS — F41.9 ANXIETY: Primary | ICD-10-CM

## 2022-10-13 DIAGNOSIS — M25.571 PAIN IN JOINTS OF BOTH FEET: ICD-10-CM

## 2022-10-13 PROCEDURE — G8420 CALC BMI NORM PARAMETERS: HCPCS | Performed by: FAMILY MEDICINE

## 2022-10-13 PROCEDURE — 3017F COLORECTAL CA SCREEN DOC REV: CPT | Performed by: FAMILY MEDICINE

## 2022-10-13 PROCEDURE — 99214 OFFICE O/P EST MOD 30 MIN: CPT | Performed by: FAMILY MEDICINE

## 2022-10-13 PROCEDURE — 1036F TOBACCO NON-USER: CPT | Performed by: FAMILY MEDICINE

## 2022-10-13 PROCEDURE — G8399 PT W/DXA RESULTS DOCUMENT: HCPCS | Performed by: FAMILY MEDICINE

## 2022-10-13 PROCEDURE — G8484 FLU IMMUNIZE NO ADMIN: HCPCS | Performed by: FAMILY MEDICINE

## 2022-10-13 PROCEDURE — 1090F PRES/ABSN URINE INCON ASSESS: CPT | Performed by: FAMILY MEDICINE

## 2022-10-13 PROCEDURE — G8427 DOCREV CUR MEDS BY ELIG CLIN: HCPCS | Performed by: FAMILY MEDICINE

## 2022-10-13 PROCEDURE — 1123F ACP DISCUSS/DSCN MKR DOCD: CPT | Performed by: FAMILY MEDICINE

## 2022-10-13 SDOH — ECONOMIC STABILITY: FOOD INSECURITY: WITHIN THE PAST 12 MONTHS, THE FOOD YOU BOUGHT JUST DIDN'T LAST AND YOU DIDN'T HAVE MONEY TO GET MORE.: NEVER TRUE

## 2022-10-13 SDOH — ECONOMIC STABILITY: FOOD INSECURITY: WITHIN THE PAST 12 MONTHS, YOU WORRIED THAT YOUR FOOD WOULD RUN OUT BEFORE YOU GOT MONEY TO BUY MORE.: NEVER TRUE

## 2022-10-13 ASSESSMENT — SOCIAL DETERMINANTS OF HEALTH (SDOH): HOW HARD IS IT FOR YOU TO PAY FOR THE VERY BASICS LIKE FOOD, HOUSING, MEDICAL CARE, AND HEATING?: NOT VERY HARD

## 2022-10-13 ASSESSMENT — PATIENT HEALTH QUESTIONNAIRE - PHQ9
SUM OF ALL RESPONSES TO PHQ QUESTIONS 1-9: 0
DEPRESSION UNABLE TO ASSESS: FUNCTIONAL CAPACITY MOTIVATION LIMITS ACCURACY
SUM OF ALL RESPONSES TO PHQ QUESTIONS 1-9: 0
SUM OF ALL RESPONSES TO PHQ QUESTIONS 1-9: 0
1. LITTLE INTEREST OR PLEASURE IN DOING THINGS: 0
SUM OF ALL RESPONSES TO PHQ QUESTIONS 1-9: 0
2. FEELING DOWN, DEPRESSED OR HOPELESS: 0
SUM OF ALL RESPONSES TO PHQ9 QUESTIONS 1 & 2: 0

## 2022-10-13 NOTE — PROGRESS NOTES
mouth nightly. L-LYSINE PO Take 1 tablet by mouth daily       No current facility-administered medications for this visit. Allergies   Allergen Reactions    Bentyl [Dicyclomine Hcl]      Makes her feel disoriented    Darvocet A500 [Propoxyphene N-Acetaminophen]     Propoxyphene     Sertraline Hcl Other (See Comments)    Albuterol Palpitations    Clarithromycin Palpitations    Lortab [Hydrocodone-Acetaminophen] Nausea Only and Nausea And Vomiting    Penicillins Nausea Only and Nausea And Vomiting     All \"Cillins\"       Health Maintenance   Topic Date Due    COVID-19 Vaccine (1) Never done    Hepatitis C screen  Never done    Shingles vaccine (1 of 2) Never done    DTaP/Tdap/Td vaccine (2 - Td or Tdap) 07/06/2021    Flu vaccine (1) Never done    Colorectal Cancer Screen  09/10/2022    Breast cancer screen  10/19/2022    Depression Screen  10/27/2023    Annual Wellness Visit (AWV)  10/28/2023    Lipids  10/21/2027    DEXA (modify frequency per FRAX score)  Completed    Pneumococcal 65+ years Vaccine  Completed    Hepatitis A vaccine  Aged Out    Hib vaccine  Aged Out    Meningococcal (ACWY) vaccine  Aged Out       Subjective:     Review of Systems   Constitutional:  Negative for chills and fever. HENT:  Negative for congestion. Respiratory:  Negative for cough, chest tightness and shortness of breath. Cardiovascular:  Negative for chest pain, palpitations and leg swelling. Gastrointestinal:  Negative for abdominal pain, anal bleeding, constipation, diarrhea and nausea. Genitourinary:  Negative for difficulty urinating. Musculoskeletal:  Positive for arthralgias. Psychiatric/Behavioral: Negative. See HPI for visit specific review of symptoms. All others negative      Objective:   BP (!) 140/90   Pulse 77   Temp 97.3 °F (36.3 °C)   Ht 5' 2.5\" (1.588 m)   Wt 122 lb 4 oz (55.5 kg)   SpO2 99%   BMI 22.00 kg/m²    Physical Exam  Constitutional:       Appearance: She is well-developed. She is not ill-appearing. Eyes:      Pupils: Pupils are equal, round, and reactive to light. Cardiovascular:      Rate and Rhythm: Normal rate and regular rhythm. Heart sounds: No murmur heard. No friction rub. Pulmonary:      Effort: Pulmonary effort is normal. No respiratory distress. Breath sounds: Normal breath sounds. No wheezing or rales. Abdominal:      General: Bowel sounds are normal. There is no distension. Palpations: Abdomen is soft. Tenderness: There is no abdominal tenderness. There is no guarding or rebound. Musculoskeletal:         General: Tenderness (metatarsals bilaterally) present. Cervical back: Normal range of motion and neck supple. Neurological:      Mental Status: She is alert.    Psychiatric:         Behavior: Behavior normal.         Lab Review   Recent Results (from the past 672 hour(s))   Vitamin D 25 Hydroxy    Collection Time: 10/21/22  3:16 PM   Result Value Ref Range    Vit D, 25-Hydroxy 23.7 (L) >=30 ng/mL   Sedimentation Rate    Collection Time: 10/21/22  3:16 PM   Result Value Ref Range    Sed Rate 14 0 - 25 mm/Hr   Rheumatoid Factor    Collection Time: 10/21/22  3:16 PM   Result Value Ref Range    Rheumatoid Factor <10.0 <14 IU/mL   Uric Acid    Collection Time: 10/21/22  3:16 PM   Result Value Ref Range    Uric Acid, Serum 4.0 2.4 - 5.7 mg/dL   Cyclic Citrul Peptide Antibody, IgG    Collection Time: 10/21/22  3:16 PM   Result Value Ref Range    CCP IgG Antibodies 2 0 - 19 Units   GINO Screen with Reflex    Collection Time: 10/21/22  3:16 PM   Result Value Ref Range    GINO Ab, IgG ENDER None Detected None Detected   Lipid Panel    Collection Time: 10/21/22  3:16 PM   Result Value Ref Range    Cholesterol, Total 227 (H) 160 - 199 mg/dL    Triglycerides 61 0 - 149 mg/dL    HDL 77 65 - 121 mg/dL    LDL Calculated 138 <100 mg/dL   Comprehensive Metabolic Panel    Collection Time: 10/21/22  3:16 PM   Result Value Ref Range    Sodium 130 (L) 136 - 145 mmol/L    Potassium 4.1 3.5 - 5.0 mmol/L    Chloride 93 (L) 98 - 111 mmol/L    CO2 26 22 - 29 mmol/L    Anion Gap 11 7 - 19 mmol/L    Glucose 91 74 - 109 mg/dL    BUN 9 8 - 23 mg/dL    Creatinine 0.6 0.5 - 0.9 mg/dL    Est, Glom Filt Rate >60 >60    Calcium 10.4 (H) 8.8 - 10.2 mg/dL    Total Protein 8.9 (H) 6.6 - 8.7 g/dL    Albumin 4.5 3.5 - 5.2 g/dL    Total Bilirubin 0.6 0.2 - 1.2 mg/dL    Alkaline Phosphatase 78 35 - 104 U/L    ALT 27 5 - 33 U/L    AST 26 5 - 32 U/L   CBC with Auto Differential    Collection Time: 10/21/22  3:16 PM   Result Value Ref Range    WBC 9.0 4.8 - 10.8 K/uL    RBC 4.18 (L) 4.20 - 5.40 M/uL    Hemoglobin 13.3 12.0 - 16.0 g/dL    Hematocrit 39.1 37.0 - 47.0 %    MCV 93.5 81.0 - 99.0 fL    MCH 31.8 (H) 27.0 - 31.0 pg    MCHC 34.0 33.0 - 37.0 g/dL    RDW 12.1 11.5 - 14.5 %    Platelets 622 176 - 057 K/uL    MPV 10.6 9.4 - 12.3 fL    Neutrophils % 67.4 (H) 50.0 - 65.0 %    Lymphocytes % 25.3 20.0 - 40.0 %    Monocytes % 4.7 0.0 - 10.0 %    Eosinophils % 1.9 0.0 - 5.0 %    Basophils % 0.6 0.0 - 1.0 %    Neutrophils Absolute 6.1 1.5 - 7.5 K/uL    Immature Granulocytes # 0.0 K/uL    Lymphocytes Absolute 2.3 1.1 - 4.5 K/uL    Monocytes Absolute 0.40 0.00 - 0.90 K/uL    Eosinophils Absolute 0.20 0.00 - 0.60 K/uL    Basophils Absolute 0.10 0.00 - 0.20 K/uL               Assessment & Plan: The following diagnoses and conditions are stable withno further orders unless indicated:  Tabitha Soliman was seen today for foot pain and edema. Diagnoses and all orders for this visit:    Anxiety  Xanax daily. Encouraged stress reduction techniques. Pain in joints of both feet  -     CBC with Auto Differential; Future  -     GINO Screen with Reflex; Future  -     Cyclic Citrul Peptide Antibody, IgG; Future  -     Uric Acid; Future  -     Rheumatoid Factor; Future  -     Sedimentation Rate; Future  -     XR FOOT LEFT (MIN 3 VIEWS); Future  -     XR FOOT RIGHT (MIN 3 VIEWS);  Future  Xrays left and right feet to evaluate for arthritis vs. Other. Will check labs. Wear good support shoes. Vitamin D deficiency  -     Vitamin D 25 Hydroxy; Future  Check labs. Mixed hyperlipidemia  -     Comprehensive Metabolic Panel; Future  -     Lipid Panel; Future  Will check labs. Elevated blood pressure reading without diagnosis of hypertension  BP elevated, monitor home readings, if persistently >140/90, consider meds. Return for already scheduled. Discussed use, benefit, and side effects of prescribed medications. All patient questions answered. Pt voiced understanding. Reviewed health maintenance. Instructed to continue current medications, diet and exercise. Patient agreedwith treatment plan. Follow up as directed.

## 2022-10-21 DIAGNOSIS — M25.572 PAIN IN JOINTS OF BOTH FEET: ICD-10-CM

## 2022-10-21 DIAGNOSIS — M25.571 PAIN IN JOINTS OF BOTH FEET: ICD-10-CM

## 2022-10-21 DIAGNOSIS — E78.2 MIXED HYPERLIPIDEMIA: ICD-10-CM

## 2022-10-21 DIAGNOSIS — E55.9 VITAMIN D DEFICIENCY: ICD-10-CM

## 2022-10-21 LAB
ALBUMIN SERPL-MCNC: 4.5 G/DL (ref 3.5–5.2)
ALP BLD-CCNC: 78 U/L (ref 35–104)
ALT SERPL-CCNC: 27 U/L (ref 5–33)
ANION GAP SERPL CALCULATED.3IONS-SCNC: 11 MMOL/L (ref 7–19)
AST SERPL-CCNC: 26 U/L (ref 5–32)
BASOPHILS ABSOLUTE: 0.1 K/UL (ref 0–0.2)
BASOPHILS RELATIVE PERCENT: 0.6 % (ref 0–1)
BILIRUB SERPL-MCNC: 0.6 MG/DL (ref 0.2–1.2)
BUN BLDV-MCNC: 9 MG/DL (ref 8–23)
CALCIUM SERPL-MCNC: 10.4 MG/DL (ref 8.8–10.2)
CHLORIDE BLD-SCNC: 93 MMOL/L (ref 98–111)
CHOLESTEROL, TOTAL: 227 MG/DL (ref 160–199)
CO2: 26 MMOL/L (ref 22–29)
CREAT SERPL-MCNC: 0.6 MG/DL (ref 0.5–0.9)
EOSINOPHILS ABSOLUTE: 0.2 K/UL (ref 0–0.6)
EOSINOPHILS RELATIVE PERCENT: 1.9 % (ref 0–5)
GFR SERPL CREATININE-BSD FRML MDRD: >60 ML/MIN/{1.73_M2}
GLUCOSE BLD-MCNC: 91 MG/DL (ref 74–109)
HCT VFR BLD CALC: 39.1 % (ref 37–47)
HDLC SERPL-MCNC: 77 MG/DL (ref 65–121)
HEMOGLOBIN: 13.3 G/DL (ref 12–16)
IMMATURE GRANULOCYTES #: 0 K/UL
LDL CHOLESTEROL CALCULATED: 138 MG/DL
LYMPHOCYTES ABSOLUTE: 2.3 K/UL (ref 1.1–4.5)
LYMPHOCYTES RELATIVE PERCENT: 25.3 % (ref 20–40)
MCH RBC QN AUTO: 31.8 PG (ref 27–31)
MCHC RBC AUTO-ENTMCNC: 34 G/DL (ref 33–37)
MCV RBC AUTO: 93.5 FL (ref 81–99)
MONOCYTES ABSOLUTE: 0.4 K/UL (ref 0–0.9)
MONOCYTES RELATIVE PERCENT: 4.7 % (ref 0–10)
NEUTROPHILS ABSOLUTE: 6.1 K/UL (ref 1.5–7.5)
NEUTROPHILS RELATIVE PERCENT: 67.4 % (ref 50–65)
PDW BLD-RTO: 12.1 % (ref 11.5–14.5)
PLATELET # BLD: 371 K/UL (ref 130–400)
PMV BLD AUTO: 10.6 FL (ref 9.4–12.3)
POTASSIUM SERPL-SCNC: 4.1 MMOL/L (ref 3.5–5)
RBC # BLD: 4.18 M/UL (ref 4.2–5.4)
RHEUMATOID FACTOR: <10 IU/ML
SEDIMENTATION RATE, ERYTHROCYTE: 14 MM/HR (ref 0–25)
SODIUM BLD-SCNC: 130 MMOL/L (ref 136–145)
TOTAL PROTEIN: 8.9 G/DL (ref 6.6–8.7)
TRIGL SERPL-MCNC: 61 MG/DL (ref 0–149)
URIC ACID, SERUM: 4 MG/DL (ref 2.4–5.7)
VITAMIN D 25-HYDROXY: 23.7 NG/ML
WBC # BLD: 9 K/UL (ref 4.8–10.8)

## 2022-10-24 DIAGNOSIS — M25.571 PAIN IN JOINTS OF BOTH FEET: ICD-10-CM

## 2022-10-24 DIAGNOSIS — M25.572 PAIN IN JOINTS OF BOTH FEET: ICD-10-CM

## 2022-10-24 LAB — ANA IGG, ELISA: NORMAL

## 2022-10-25 DIAGNOSIS — E78.2 MIXED HYPERLIPIDEMIA: ICD-10-CM

## 2022-10-25 DIAGNOSIS — E87.1 HYPONATREMIA: Primary | ICD-10-CM

## 2022-10-25 LAB — CCP IGG ANTIBODIES: 2 UNITS (ref 0–19)

## 2022-10-27 ENCOUNTER — OFFICE VISIT (OUTPATIENT)
Dept: FAMILY MEDICINE CLINIC | Age: 72
End: 2022-10-27
Payer: MEDICARE

## 2022-10-27 VITALS
WEIGHT: 122 LBS | HEIGHT: 63 IN | OXYGEN SATURATION: 99 % | TEMPERATURE: 98.4 F | BODY MASS INDEX: 21.62 KG/M2 | DIASTOLIC BLOOD PRESSURE: 84 MMHG | SYSTOLIC BLOOD PRESSURE: 126 MMHG | HEART RATE: 79 BPM

## 2022-10-27 DIAGNOSIS — Z00.00 MEDICARE ANNUAL WELLNESS VISIT, SUBSEQUENT: Primary | ICD-10-CM

## 2022-10-27 PROCEDURE — 1123F ACP DISCUSS/DSCN MKR DOCD: CPT | Performed by: NURSE PRACTITIONER

## 2022-10-27 PROCEDURE — G0439 PPPS, SUBSEQ VISIT: HCPCS | Performed by: NURSE PRACTITIONER

## 2022-10-27 PROCEDURE — 3017F COLORECTAL CA SCREEN DOC REV: CPT | Performed by: NURSE PRACTITIONER

## 2022-10-27 PROCEDURE — G8484 FLU IMMUNIZE NO ADMIN: HCPCS | Performed by: NURSE PRACTITIONER

## 2022-10-27 ASSESSMENT — LIFESTYLE VARIABLES
HOW OFTEN DO YOU HAVE A DRINK CONTAINING ALCOHOL: NEVER
HOW MANY STANDARD DRINKS CONTAINING ALCOHOL DO YOU HAVE ON A TYPICAL DAY: PATIENT DOES NOT DRINK

## 2022-10-27 ASSESSMENT — PATIENT HEALTH QUESTIONNAIRE - PHQ9
SUM OF ALL RESPONSES TO PHQ9 QUESTIONS 1 & 2: 0
SUM OF ALL RESPONSES TO PHQ QUESTIONS 1-9: 0
2. FEELING DOWN, DEPRESSED OR HOPELESS: 0
1. LITTLE INTEREST OR PLEASURE IN DOING THINGS: 0
SUM OF ALL RESPONSES TO PHQ QUESTIONS 1-9: 0

## 2022-10-27 NOTE — PROGRESS NOTES
Medicare Annual Wellness Visit    Silvino Anguiano is here for Medicare AWV    Assessment & Plan   Medicare annual wellness visit, subsequent    Recommendations for Preventive Services Due: see orders and patient instructions/AVS.  Recommended screening schedule for the next 5-10 years is provided to the patient in written form: see Patient Instructions/AVS.     Return for Medicare Annual Wellness Visit in 1 year. Subjective   The following acute and/or chronic problems were also addressed today:  Annual Wellness visit. Patient's complete Health Risk Assessment and screening values have been reviewed and are found in Flowsheets. The following problems were reviewed today and where indicated follow up appointments were made and/or referrals ordered. Positive Risk Factor Screenings with Interventions:      Depression:  PHQ-2 Score: 0  PHQ-9 Total Score: 0    Severity:1-4 = minimal depression, 5-9 = mild depression, 10-14 = moderate depression, 15-19 = moderately severe depression, 20-27 = severe depression        General Health and ACP:  General  In general, how would you say your health is?: Fair  In the past 7 days, have you experienced any of the following: New or Increased Pain, New or Increased Fatigue, Loneliness, Social Isolation, Stress or Anger?: No  Do you get the social and emotional support that you need?: Yes  Do you have a Living Will?: Yes    Advance Directives       Power of  Living Will ACP-Advance Directive ACP-Power of     Not on File Not on File Not on File Not on File        General Health Risk Interventions:  Fatigue: she feels it is due to less sleep at night. States she wakes up at times during the night to urinate and have trouble going back to sleep.       Health Habits/Nutrition:  Physical Activity: Insufficiently Active    Days of Exercise per Week: 3 days    Minutes of Exercise per Session: 20 min     Have you lost any weight without trying in the past 3 months?: No  Body mass index: 21.61  Have you seen the dentist within the past year?: (!) No  Health Habits/Nutrition Interventions:  Inadequate physical activity:  states she moves a lot and up and down stairs a lot. Nutritional issues:  patient is not ready to address his/her nutritional/weight issues at this time  Dental exam overdue:  states she can not afford a dental exam at this time. Hearing/Vision:  Do you or your family notice any trouble with your hearing that hasn't been managed with hearing aids?: No  Do you have difficulty driving, watching TV, or doing any of your daily activities because of your eyesight?: No  Have you had an eye exam within the past year?: (!) No  No results found. Hearing/Vision Interventions:  Denies any hearing or vision concerns. Wears glasses. Safety:  Do you have working smoke detectors?: Yes  Do you have any tripping hazards - loose or unsecured carpets or rugs?: No  Do you have any tripping hazards - clutter in doorways, halls, or stairs?: No  Do you have either shower bars, grab bars, non-slip mats or non-slip surfaces in your shower or bathtub?: (!) No  Do all of your stairways have a railing or banister?: Yes  Do you always fasten your seatbelt when you are in a car?: Yes  Safety Interventions:  Denies any problems with safety           Objective   Vitals:    10/27/22 1311   BP: 126/84   Pulse: 79   Temp: 98.4 °F (36.9 °C)   SpO2: 99%   Weight: 122 lb (55.3 kg)   Height: 5' 3\" (1.6 m)      Body mass index is 21.61 kg/m².       General Appearance: alert and oriented to person, place and time, well developed and well- nourished, in no acute distress  Skin: warm and dry, no rash or erythema  Head: normocephalic and atraumatic  Eyes: pupils equal, round, and reactive to light, extraocular eye movements intact, conjunctivae normal  ENT: tympanic membrane, external ear and ear canal normal bilaterally, nose without deformity, nasal mucosa and turbinates normal without polyps  Neck: supple and non-tender without mass, no thyromegaly or thyroid nodules, no cervical lymphadenopathy  Pulmonary/Chest: clear to auscultation bilaterally- no wheezes, rales or rhonchi, normal air movement, no respiratory distress  Cardiovascular: normal rate, regular rhythm, normal S1 and S2, no murmurs, rubs, clicks, or gallops, distal pulses intact, no carotid bruits  Abdomen: soft, non-tender, non-distended, normal bowel sounds, no masses or organomegaly  Extremities: no cyanosis, clubbing or edema  Musculoskeletal: normal range of motion, no joint swelling, deformity or tenderness  Neurologic: reflexes normal and symmetric, no cranial nerve deficit, gait, coordination and speech normal       Allergies   Allergen Reactions    Bentyl [Dicyclomine Hcl]      Makes her feel disoriented    Darvocet A500 [Propoxyphene N-Acetaminophen]     Propoxyphene     Sertraline Hcl Other (See Comments)    Albuterol Palpitations    Clarithromycin Palpitations    Lortab [Hydrocodone-Acetaminophen] Nausea Only and Nausea And Vomiting    Penicillins Nausea Only and Nausea And Vomiting     All \"Cillins\"     Prior to Visit Medications    Medication Sig Taking? Authorizing Provider   aspirin EC 81 MG EC tablet Take 1 tablet by mouth daily Yes Shira Rosen MD   vitamin B-12 (CYANOCOBALAMIN) 100 MCG tablet Take 50 mcg by mouth daily Yes Historical Provider, MD   docusate sodium (COLACE) 100 MG capsule Take 100 mg by mouth Yes Historical Provider, MD   vitamin D (CHOLECALCIFEROL) 1000 UNIT TABS tablet Take 2,000 Units by mouth daily  Yes Historical Provider, MD   Ascorbic Acid (VITAMIN C) 500 MG CAPS Take 1 tablet by mouth daily. Yes Historical Provider, MD   melatonin 3 MG TABS tablet Take 3 mg by mouth nightly.  Yes Historical Provider, MD   L-LYSINE PO Take 1 tablet by mouth daily Yes Historical Provider, MD   atorvastatin (LIPITOR) 40 MG tablet Take 1 tablet by mouth daily  Patient not taking: Reported on 10/13/2022 Doug Birmingham MD   famotidine (PEPCID) 20 MG tablet Take 1 tablet by mouth 2 times daily  Patient not taking: Reported on 10/27/2022  Geoff Tapia MD       [facility] (Including outside providers/suppliers regularly involved in providing care):   Patient Care Team:  Cristobal Baez MD as PCP - General (Family Medicine)  Cristobal Baez MD as PCP - [facility]  Kuldip Beyer MD as Consulting Physician (Oncology)  Doug Birmingham MD as Consulting Physician (Cardiology)     Reviewed and updated this visit:  Tobacco  Allergies  Meds  Med Hx  Surg Hx  Soc Hx  Fam Hx

## 2022-10-27 NOTE — PATIENT INSTRUCTIONS
Personalized Preventive Plan for Pepe Rankin - 10/27/2022  Medicare offers a range of preventive health benefits. Some of the tests and screenings are paid in full while other may be subject to a deductible, co-insurance, and/or copay. Some of these benefits include a comprehensive review of your medical history including lifestyle, illnesses that may run in your family, and various assessments and screenings as appropriate. After reviewing your medical record and screening and assessments performed today your provider may have ordered immunizations, labs, imaging, and/or referrals for you. A list of these orders (if applicable) as well as your Preventive Care list are included within your After Visit Summary for your review. Other Preventive Recommendations:    A preventive eye exam performed by an eye specialist is recommended every 1-2 years to screen for glaucoma; cataracts, macular degeneration, and other eye disorders. A preventive dental visit is recommended every 6 months. Try to get at least 150 minutes of exercise per week or 10,000 steps per day on a pedometer . Order or download the FREE \"Exercise & Physical Activity: Your Everyday Guide\" from [employer]. Call 7-569.481.4877 or search [employer] online. You need 5752-3626 mg of calcium and 8201-4813 IU of vitamin D per day. It is possible to meet your calcium requirement with diet alone, but a vitamin D supplement is usually necessary to meet this goal.  When exposed to the sun, use a sunscreen that protects against both UVA and UVB radiation with an SPF of 30 or greater. Reapply every 2 to 3 hours or after sweating, drying off with a towel, or swimming. Always wear a seat belt when traveling in a car. Always wear a helmet when riding a bicycle or motorcycle.

## 2022-11-07 ASSESSMENT — ENCOUNTER SYMPTOMS
NAUSEA: 0
CONSTIPATION: 0
COUGH: 0
SHORTNESS OF BREATH: 0
DIARRHEA: 0
ANAL BLEEDING: 0
CHEST TIGHTNESS: 0
ABDOMINAL PAIN: 0

## 2022-12-08 ENCOUNTER — OFFICE VISIT (OUTPATIENT)
Dept: CARDIOLOGY CLINIC | Age: 72
End: 2022-12-08
Payer: MEDICARE

## 2022-12-08 VITALS
HEIGHT: 62 IN | SYSTOLIC BLOOD PRESSURE: 140 MMHG | WEIGHT: 121 LBS | OXYGEN SATURATION: 98 % | HEART RATE: 81 BPM | DIASTOLIC BLOOD PRESSURE: 80 MMHG | BODY MASS INDEX: 22.26 KG/M2

## 2022-12-08 DIAGNOSIS — I48.0 PAROXYSMAL A-FIB (HCC): Primary | ICD-10-CM

## 2022-12-08 PROCEDURE — 1090F PRES/ABSN URINE INCON ASSESS: CPT | Performed by: INTERNAL MEDICINE

## 2022-12-08 PROCEDURE — 93000 ELECTROCARDIOGRAM COMPLETE: CPT | Performed by: INTERNAL MEDICINE

## 2022-12-08 PROCEDURE — 99214 OFFICE O/P EST MOD 30 MIN: CPT | Performed by: INTERNAL MEDICINE

## 2022-12-08 PROCEDURE — 1123F ACP DISCUSS/DSCN MKR DOCD: CPT | Performed by: INTERNAL MEDICINE

## 2022-12-08 PROCEDURE — 1036F TOBACCO NON-USER: CPT | Performed by: INTERNAL MEDICINE

## 2022-12-08 PROCEDURE — G8484 FLU IMMUNIZE NO ADMIN: HCPCS | Performed by: INTERNAL MEDICINE

## 2022-12-08 PROCEDURE — G8427 DOCREV CUR MEDS BY ELIG CLIN: HCPCS | Performed by: INTERNAL MEDICINE

## 2022-12-08 PROCEDURE — G8420 CALC BMI NORM PARAMETERS: HCPCS | Performed by: INTERNAL MEDICINE

## 2022-12-08 PROCEDURE — G8399 PT W/DXA RESULTS DOCUMENT: HCPCS | Performed by: INTERNAL MEDICINE

## 2022-12-08 PROCEDURE — 3017F COLORECTAL CA SCREEN DOC REV: CPT | Performed by: INTERNAL MEDICINE

## 2022-12-08 ASSESSMENT — ENCOUNTER SYMPTOMS
ABDOMINAL DISTENTION: 0
EYE REDNESS: 0
EYE PAIN: 0
FACIAL SWELLING: 0
VOMITING: 0
SORE THROAT: 0
COUGH: 0
ABDOMINAL PAIN: 0
EYE DISCHARGE: 0
DIARRHEA: 0
CONSTIPATION: 0
NAUSEA: 0
SHORTNESS OF BREATH: 0
WHEEZING: 0
APNEA: 0
CHEST TIGHTNESS: 0
BLOOD IN STOOL: 0

## 2022-12-08 NOTE — PROGRESS NOTES
Cardiology Office Visit Note  [address]  [id number]  Phone: [phone]  Fax: [phone]                            Date:  12/8/2022  Patient: Aby Manzanares  Age:  81 y.o., 1950    Referral: No ref. provider found    REASON FOR VISIT:  6 Month Follow-Up ([facility]         PROBLEM LIST:    Patient Active Problem List    Diagnosis Date Noted    Chronic nasal congestion 03/14/2022     Priority: Low    Vitamin D deficiency 09/12/2021     Priority: Low    Age-related osteoporosis without current pathological fracture 08/15/2019     Priority: Low    Multiple myeloma ([facility] [address] 03/05/2019     Priority: Low    Bilateral carotid artery stenosis 03/05/2019     Priority: Low    Anxiety 02/05/2018     Priority: Low    Elevated blood pressure reading without diagnosis of hypertension 02/05/2018     Priority: Low    Hyperproteinemia 02/05/2018     Priority: Low    Postmenopause 02/05/2018     Priority: Low    Coccyx pain 08/04/2017     Priority: Low    Hyperlipidemia      Priority: Low    Hypercalcemia      Priority: Low    Elevated serum protein level      Priority: Low         PRESENTATION: Aby Manzanares is a 81y.o. year old female is seen today for an elective cardiology follow-up visit. Her past medical history notable for paroxysmal atrial fibrillation not on anticoagulation therapy due to patient's preference. Also has a history of mild carotid artery disease without history of stroke. She also has documented whitecoat hypertension. Today she reports that she is feeling well without any new or worsening symptoms. She continues to decline anticoagulation therapy. She remains on baby aspirin. No hospitalizations or emergency room visits since last office appointment. She remains physically active without any major limiting symptoms. REVIEW OF SYSTEMS:  Review of Systems   Constitutional:  Negative for chills, fatigue and fever.    HENT:  Negative for congestion, facial swelling, hearing loss and sore throat. Eyes:  Negative for pain, discharge, redness and visual disturbance. Respiratory:  Negative for apnea, cough, chest tightness, shortness of breath and wheezing. Cardiovascular:  Negative for chest pain, palpitations and leg swelling. Gastrointestinal:  Negative for abdominal distention, abdominal pain, blood in stool, constipation, diarrhea, nausea and vomiting. Endocrine: Negative for polydipsia, polyphagia and polyuria. Genitourinary:  Negative for dysuria, flank pain, frequency and hematuria. Musculoskeletal:  Negative for joint swelling, myalgias and neck pain. Skin:  Negative for pallor and rash. Neurological:  Negative for dizziness, syncope, speech difficulty, light-headedness, numbness and headaches. Psychiatric/Behavioral:  Negative for confusion, hallucinations and sleep disturbance. Past Medical History:      Diagnosis Date    Anxiety     Elevated blood pressure reading     Elevated serum protein level     GERD (gastroesophageal reflux disease)     History of multiple myeloma     Hypercalcemia     Hyperlipidemia     Monoclonal gammopathy        Past Surgical History:      Procedure Laterality Date    HYSTERECTOMY (CERVIX STATUS UNKNOWN)      TIBIA FRACTURE SURGERY Left     with pradeep in lower left leg       Medications:  Current Outpatient Medications   Medication Sig Dispense Refill    aspirin EC 81 MG EC tablet Take 1 tablet by mouth daily 90 tablet 1    vitamin B-12 (CYANOCOBALAMIN) 100 MCG tablet Take 50 mcg by mouth daily      docusate sodium (COLACE) 100 MG capsule Take 100 mg by mouth      vitamin D (CHOLECALCIFEROL) 1000 UNIT TABS tablet Take 2,000 Units by mouth daily       Ascorbic Acid (VITAMIN C) 500 MG CAPS Take 1 tablet by mouth daily. melatonin 3 MG TABS tablet Take 3 mg by mouth nightly as needed      L-LYSINE PO Take 1 tablet by mouth daily PRN       No current facility-administered medications for this visit. Allergies:  Bentyl [dicyclomine hcl], Darvocet a500 [propoxyphene n-acetaminophen], Propoxyphene, Sertraline hcl, Albuterol, Clarithromycin, Lortab [hydrocodone-acetaminophen], and Penicillins    Social History:  Social History     Occupational History    Not on file   Tobacco Use    Smoking status: Never    Smokeless tobacco: Never   Vaping Use    Vaping Use: Never used   Substance and Sexual Activity    Alcohol use: No    Drug use: No    Sexual activity: Not on file         Family History:       Problem Relation Age of Onset    High Blood Pressure Mother     Cancer Father     High Cholesterol Brother          Physical Examination:  BP (!) 140/80   Pulse 81   Ht 5' 2\" (1.575 m)   Wt 121 lb (54.9 kg)   SpO2 98%   BMI 22.13 kg/m²   Physical Exam  Vitals reviewed. Constitutional:       General: She is not in acute distress. Appearance: Normal appearance. She is not ill-appearing, toxic-appearing or diaphoretic. HENT:      Head: Normocephalic and atraumatic. Eyes:      General: No scleral icterus. Right eye: No discharge. Left eye: No discharge. Conjunctiva/sclera: Conjunctivae normal.   Neck:      Vascular: No carotid bruit. Cardiovascular:      Rate and Rhythm: Normal rate and regular rhythm. No extrasystoles are present. Chest Wall: PMI is not displaced. No thrill. Heart sounds: S1 normal and S2 normal. No murmur heard. No friction rub. No gallop. Pulmonary:      Effort: Pulmonary effort is normal. No tachypnea or respiratory distress. Breath sounds: Normal breath sounds. No stridor. No wheezing, rhonchi or rales. Chest:      Chest wall: No tenderness. Abdominal:      General: Bowel sounds are normal. There is no distension. Palpations: Abdomen is soft. There is no mass. Tenderness: There is no abdominal tenderness. There is no guarding. Musculoskeletal:         General: No swelling.       Cervical back: Normal range of motion and neck supple. No rigidity. Right lower leg: No edema. Left lower leg: No edema. Skin:     General: Skin is warm and dry. Coloration: Skin is not jaundiced. Findings: No erythema or rash. Neurological:      General: No focal deficit present. Mental Status: She is alert and oriented to person, place, and time. Mental status is at baseline. Psychiatric:         Mood and Affect: Mood normal.         Behavior: Behavior normal.         Thought Content: Thought content normal.         Labs:     APTT:  No results found for: APTT   CARDIAC ENZYMES: No results found for: CKTOTAL, CKMB, CKMBINDEX, TROPONINI   LIPID PANEL: No results found for: LIPIDPAN  LIVER PROFILE:   AST   Date Value Ref Range Status   10/21/2022 26 5 - 32 U/L Final     ALT   Date Value Ref Range Status   10/21/2022 27 5 - 33 U/L Final     Albumin   Date Value Ref Range Status   10/21/2022 4.5 3.5 - 5.2 g/dL Final              ASSESSMENT and PLAN:    Paroxysmal atrial fibrillation, CHADS2 vascular risk score 3 (peripheral vascular disease, age and gender). Patient continues to decline antithrombotic therapy. Agreeable to baby aspirin only. Peripheral vascular disease, mild bilateral carotid artery disease with less than 50% stenosis. No history of CVA/TIA. Dyslipidemia goal LDL less than 70  Patient declined statin therapy    Overall patient remained stable from a started vascular standpoint. Continue current conservative treatment plan. Electronically signed by Randal Melchor MD on 12/8/2022 at 2:13 PM    Randal Melchor MD, JENELLE, [facility]  Noninvasive Cardiology Consultant    This dictation was generated by voice recognition computer software. Although all attempts are made to edit the dictation for accuracy, there may be errors in the transcription that are not intended.

## 2023-01-23 ENCOUNTER — HOSPITAL ENCOUNTER (OUTPATIENT)
Dept: INFUSION THERAPY | Age: 73
Discharge: HOME | End: 2023-01-23
Payer: MEDICARE

## 2023-01-23 ENCOUNTER — OFFICE VISIT (OUTPATIENT)
Dept: HEMATOLOGY | Age: 73
End: 2023-01-23
Payer: MEDICARE

## 2023-01-23 VITALS
OXYGEN SATURATION: 99 % | DIASTOLIC BLOOD PRESSURE: 70 MMHG | SYSTOLIC BLOOD PRESSURE: 138 MMHG | HEIGHT: 63 IN | HEART RATE: 92 BPM | WEIGHT: 124 LBS | BODY MASS INDEX: 21.97 KG/M2

## 2023-01-23 DIAGNOSIS — D47.2 SMOLDERING MULTIPLE MYELOMA: Primary | ICD-10-CM

## 2023-01-23 DIAGNOSIS — I48.20 CHRONIC ATRIAL FIBRILLATION (HCC): ICD-10-CM

## 2023-01-23 DIAGNOSIS — D47.2 SMOLDERING MULTIPLE MYELOMA: ICD-10-CM

## 2023-01-23 DIAGNOSIS — Z71.89 CARE PLAN DISCUSSED WITH PATIENT: ICD-10-CM

## 2023-01-23 LAB
ALBUMIN SERPL-MCNC: 4.5 G/DL (ref 3.5–5.2)
ALP BLD-CCNC: 95 U/L (ref 35–104)
ALT SERPL-CCNC: 38 U/L (ref 9–52)
ANION GAP SERPL CALCULATED.3IONS-SCNC: 6 MMOL/L (ref 7–19)
AST SERPL-CCNC: 35 U/L (ref 14–36)
BILIRUB SERPL-MCNC: 0.4 MG/DL (ref 0.2–1.3)
BUN BLDV-MCNC: 15 MG/DL (ref 7–17)
CALCIUM SERPL-MCNC: 10.6 MG/DL (ref 8.4–10.2)
CHLORIDE BLD-SCNC: 107 MMOL/L (ref 98–111)
CO2: 30 MMOL/L (ref 22–29)
CREAT SERPL-MCNC: 0.7 MG/DL (ref 0.5–1)
GFR SERPL CREATININE-BSD FRML MDRD: >60 ML/MIN/{1.73_M2}
GLOBULIN: 4.4 G/DL
GLUCOSE BLD-MCNC: 98 MG/DL (ref 74–106)
HCT VFR BLD CALC: 42.5 % (ref 34.1–44.9)
HEMOGLOBIN: 13.6 G/DL (ref 11.2–15.7)
LYMPHOCYTES ABSOLUTE: 2.7 K/UL (ref 1.18–3.74)
LYMPHOCYTES RELATIVE PERCENT: 33.8 % (ref 19.3–53.1)
MCH RBC QN AUTO: 30.9 PG (ref 25.6–32.2)
MCHC RBC AUTO-ENTMCNC: 32 G/DL (ref 32.3–35.5)
MCV RBC AUTO: 96.6 FL (ref 79.4–94.8)
MONOCYTES ABSOLUTE: 0.6 K/UL (ref 0.24–0.82)
MONOCYTES RELATIVE PERCENT: 7.6 % (ref 4.7–12.5)
NEUTROPHILS ABSOLUTE: 4.8 K/UL (ref 1.56–6.13)
NEUTROPHILS RELATIVE PERCENT: 58.6 % (ref 34–71.1)
PDW BLD-RTO: 12.6 % (ref 11.7–14.4)
PLATELET # BLD: 290 K/UL (ref 182–369)
PMV BLD AUTO: 9.7 FL (ref 7.4–10.4)
POTASSIUM SERPL-SCNC: 4.2 MMOL/L (ref 3.5–5.1)
RBC # BLD: 4.4 M/UL (ref 3.93–5.22)
SODIUM BLD-SCNC: 143 MMOL/L (ref 137–145)
TOTAL PROTEIN: 9 G/DL (ref 6.3–8.2)
WBC # BLD: 8.1 K/UL (ref 3.98–10.04)

## 2023-01-23 PROCEDURE — G8420 CALC BMI NORM PARAMETERS: HCPCS | Performed by: NURSE PRACTITIONER

## 2023-01-23 PROCEDURE — 85025 COMPLETE CBC W/AUTO DIFF WBC: CPT

## 2023-01-23 PROCEDURE — 80053 COMPREHEN METABOLIC PANEL: CPT

## 2023-01-23 PROCEDURE — 1123F ACP DISCUSS/DSCN MKR DOCD: CPT | Performed by: NURSE PRACTITIONER

## 2023-01-23 PROCEDURE — 99212 OFFICE O/P EST SF 10 MIN: CPT

## 2023-01-23 PROCEDURE — 99213 OFFICE O/P EST LOW 20 MIN: CPT | Performed by: NURSE PRACTITIONER

## 2023-01-23 PROCEDURE — 1036F TOBACCO NON-USER: CPT | Performed by: NURSE PRACTITIONER

## 2023-01-23 PROCEDURE — G8484 FLU IMMUNIZE NO ADMIN: HCPCS | Performed by: NURSE PRACTITIONER

## 2023-01-23 PROCEDURE — G8427 DOCREV CUR MEDS BY ELIG CLIN: HCPCS | Performed by: NURSE PRACTITIONER

## 2023-01-23 PROCEDURE — 1090F PRES/ABSN URINE INCON ASSESS: CPT | Performed by: NURSE PRACTITIONER

## 2023-01-23 PROCEDURE — 3017F COLORECTAL CA SCREEN DOC REV: CPT | Performed by: NURSE PRACTITIONER

## 2023-01-23 PROCEDURE — 36415 COLL VENOUS BLD VENIPUNCTURE: CPT

## 2023-01-23 PROCEDURE — G8399 PT W/DXA RESULTS DOCUMENT: HCPCS | Performed by: NURSE PRACTITIONER

## 2023-01-23 ASSESSMENT — ENCOUNTER SYMPTOMS
RESPIRATORY NEGATIVE: 1
WHEEZING: 0
EYES NEGATIVE: 1
NAUSEA: 0
COUGH: 0
CONSTIPATION: 0
DIARRHEA: 0
BACK PAIN: 0
GASTROINTESTINAL NEGATIVE: 1
SORE THROAT: 0
ABDOMINAL PAIN: 0
BLOOD IN STOOL: 0
EYE PAIN: 0
SHORTNESS OF BREATH: 0
EYE REDNESS: 0
VOMITING: 0
EYE DISCHARGE: 0

## 2023-01-23 NOTE — PROGRESS NOTES
Progress Note      Pt Name: Myesha De Paz  YOB: 1950  MRN: [id number]    Date of evaluation: 1/23/2023  History Obtained From:  patient, electronic medical record    CHIEF COMPLAINT:    Chief Complaint   Patient presents with    Follow-up     Smoldering multiple myeloma ([facility] [address]    Discuss Labs     HISTORY OF PRESENT ILLNESS:    Myesha De Paz is a 81 y.o.  female who is currently being followed for smoldering multiple myeloma, IgG kappa restricted, initially diagnosed November 2016, with no known myeloma defining events and yet to require intervention. Current recommendation is for routine surveillance and lab monitoring. Unfortunately Bryson Pacheco has been lost to follow-up and was last seen in clinic on 9/23/2021, myeloma studies that day were stable as documented below. She returns today in follow-up to establish care, since her last office visit she has a new diagnosis of atrial fibrillation and is currently anticoagulated with aspirin 81 mg daily. Bryson Pacheco has no new complaints today and indicates that overall she is doing well. Today's clinic visit to include physical assessment, review of systems, any lab or radiographic findings that were available and plan of care are documented below. ONCOLOGIC HISTORY:   Diagnosis  Smoldering multiple myeloma, November 2016  IgG kappa restricted     Treatment summary  Close laboratory/clinical surveillance     Bryson Pacheco was seen on initial hematologic consultation by Navdeep Virgen on 8/4/2016 as a referral by Dr. Jeff Lazcano for evaluation of a monoclonal protein finding. She was seen by her primary care for a annual visit in July 2016 and was found to have a elevated protein at 9.3 and mildly elevated calcium levels at 10.7. This prompted a workup with serum protein electrophoresis which showed a M spike of 2, also confirmed by us on 8/4/2016. K/L light chains ratio 4.08. IgG elevated at 2258 mg/dl. IgA low at 67 mg/dl (immuneparesis).  Beta-2 microglobulin was 1.5. She complained of fatigue but has never had any fever no chills no night history of repeated infections, no weight loss. She had occasional back pain but denied any other bone pain. She also complained of occasional abdominal pain due to her inflammatory bowel syndrome. She has no other major medical problems except for anxiety. PET/CT scan 8/19/2016-unremarkable. No evidence of bone metastasis or myelomatous involvement. Therefore, she is diagnosed with smoldering multiple myeloma, M spike 2 (IgG isotype, immunoparesis IgM, Bence-Abad proteinuria (225 mg/dl). Bone marrow biopsy showed involvement by 10-12% plasma cells. No CRAB criteria met for myeloma. M.D. visit 11/23/2016- she is doing well without no new complaints. MM studies performed on 11/17/2016 compared with 08/09/2016  K/L ratio = 4.08->7.04  Urine ptn- 229-> 346 mg  M-spike- 2.0->2.    6/3/2019- SPEP showed M spike 2.2, IgG 2345  8/9/2019-PTH = 30 (WNL)  8/23/2019- bone survey was unremarkable and showed no lytic lesions. 8/27/2019- SPEP showed M spike 2.0  9/4/2019- CMP showed a normal creatinine 0.6, calcium 10.4 hemoglobin 14.1, WBC 5.8, platelets 553,877. No CRAB criteria. Free kappa light chain 74, lambda light chain 9.2, kappa/lambda ratio = 8.13  1/2/2020- MM labs showed IgG 2707, IgM 44, kappa light chains = 76, lambda light chain = 6.6, kappa/lambda = 11.6. M -spike 2.2  9/18/20- MM labs: B2M= 1.5, Kappa light chains=84.2 (H), lambda light chains 6.3, Kappa/lambda=13.37 (H), IgG 2177 (H), IgA 48 (L), IgM 33.  M-spike 2.0  3/22/2021- MM labs IgG 2704, Beta-2 Microglobulin 1.5, Kappa 75.7, Kappa/Lambda Ratio 9.23, M-spike 2.1    09/23/2021 Serology results  B2M 1.3  Kappa light chains 112  Lambda light chains 8.8  Kappa/lambda ratio 12.73  IgG 2615, IgA 48, IgM 38  M-spike 2.0      Past Medical History:    Past Medical History:   Diagnosis Date    Anxiety     Elevated blood pressure reading     Elevated serum protein level     GERD (gastroesophageal reflux disease)     History of multiple myeloma     Hypercalcemia     Hyperlipidemia     Monoclonal gammopathy        Past Surgical History:    Past Surgical History:   Procedure Laterality Date    HYSTERECTOMY (CERVIX STATUS UNKNOWN)      TIBIA FRACTURE SURGERY Left     with pradeep in lower left leg       Current Medications:    Current Outpatient Medications   Medication Sig Dispense Refill    aspirin EC 81 MG EC tablet Take 1 tablet by mouth daily 90 tablet 1    vitamin B-12 (CYANOCOBALAMIN) 100 MCG tablet Take 50 mcg by mouth daily      docusate sodium (COLACE) 100 MG capsule Take 100 mg by mouth      vitamin D (CHOLECALCIFEROL) 1000 UNIT TABS tablet Take 2,000 Units by mouth daily       Ascorbic Acid (VITAMIN C) 500 MG CAPS Take 1 tablet by mouth daily. melatonin 3 MG TABS tablet Take 3 mg by mouth nightly as needed      L-LYSINE PO Take 1 tablet by mouth daily PRN       No current facility-administered medications for this visit. Allergies:    Allergies   Allergen Reactions    Bentyl [Dicyclomine Hcl]      Makes her feel disoriented    Darvocet A500 [Propoxyphene N-Acetaminophen]     Propoxyphene     Sertraline Hcl Other (See Comments)    Albuterol Palpitations    Clarithromycin Palpitations    Lortab [Hydrocodone-Acetaminophen] Nausea Only and Nausea And Vomiting    Penicillins Nausea Only and Nausea And Vomiting     All \"Cillins\"       Social History:    Social History     Tobacco Use    Smoking status: Never    Smokeless tobacco: Never   Vaping Use    Vaping Use: Never used   Substance Use Topics    Alcohol use: No    Drug use: No       Family History:   Family History   Problem Relation Age of Onset    High Blood Pressure Mother     Cancer Father     High Cholesterol Brother        Vitals:  Vitals:    01/23/23 1349   BP: 138/70   Pulse: 92   SpO2: 99%   Weight: 124 lb (56.2 kg)   Height: 5' 3\" (1.6 m)        Subjective   REVIEW OF SYSTEMS:   Review of Systems   Constitutional:  Positive for fatigue. Negative for chills, diaphoresis and fever. HENT: Negative. Negative for congestion, ear pain, hearing loss, nosebleeds, sore throat and tinnitus. Eyes: Negative. Negative for pain, discharge and redness. Respiratory: Negative. Negative for cough, shortness of breath and wheezing. Cardiovascular: Negative. Negative for chest pain, palpitations and leg swelling. Gastrointestinal: Negative. Negative for abdominal pain, blood in stool, constipation, diarrhea, nausea and vomiting. Endocrine: Negative for polydipsia. Genitourinary:  Negative for dysuria, flank pain, frequency, hematuria and urgency. Musculoskeletal: Negative. Negative for back pain, myalgias and neck pain. Skin: Negative. Negative for rash. Neurological: Negative. Negative for dizziness, tremors, seizures, weakness and headaches. Hematological:  Does not bruise/bleed easily. Psychiatric/Behavioral: Negative. The patient is not nervous/anxious. Objective   PHYSICAL EXAM:  Physical Exam  Vitals reviewed. Constitutional:       General: She is not in acute distress. Appearance: She is well-developed. HENT:      Head: Normocephalic and atraumatic. Mouth/Throat:      Pharynx: Uvula midline. Tonsils: No tonsillar exudate. Eyes:      General: Lids are normal.      Conjunctiva/sclera: Conjunctivae normal.      Pupils: Pupils are equal, round, and reactive to light. Neck:      Thyroid: No thyroid mass or thyromegaly. Vascular: No JVD. Trachea: Trachea normal. No tracheal deviation. Cardiovascular:      Rate and Rhythm: Normal rate. Rhythm regularly irregular. Pulses: Normal pulses. Heart sounds: Normal heart sounds. Pulmonary:      Effort: Pulmonary effort is normal. No respiratory distress. Breath sounds: Normal breath sounds. No wheezing or rales. Chest:      Chest wall: No tenderness.    Abdominal:      General: Bowel sounds are normal. There is no distension. Palpations: Abdomen is soft. There is no mass. Tenderness: There is no abdominal tenderness. There is no guarding. Musculoskeletal:         General: No tenderness or deformity. Cervical back: Normal range of motion and neck supple. Comments: Range of motion within normal limits x4 extremities   Skin:     General: Skin is warm. Findings: No bruising, erythema or rash. Neurological:      Mental Status: She is alert and oriented to person, place, and time. Cranial Nerves: No cranial nerve deficit. Coordination: Coordination normal.   Psychiatric:         Behavior: Behavior normal.         Thought Content: Thought content normal.       Labs reviewed today:  Lab Results   Component Value Date    WBC 8.10 01/23/2023    HGB 13.6 01/23/2023    HCT 42.5 01/23/2023    MCV 96.6 (H) 01/23/2023     01/23/2023     Lab Results   Component Value Date    NEUTROABS 4.80 01/23/2023       ASSESSMENT/PLAN:      1. Smoldering multiple myeloma, IgG kappa restricted, initially diagnosed November 2016, with no known myeloma defining events and yet to require intervention. Current recommendation is for routine surveillance and lab monitoring. Unfortunately Bashir Castro has been lost to follow-up and was last seen in clinic on 9/23/2021, myeloma studies that day were stable as documented below. 09/23/2021 Serology results  B2M 1.3  Kappa light chains 112  Lambda light chains 8.8  Kappa/lambda ratio 12.73  IgG 2615, IgA 48, IgM 38  M-spike 2.0    CBC today revealed WBC of 8.1, hemoglobin 13.6 and a platelet count of 520,961. CMP revealed a creatinine of 0.7 and a calcium of 10.6. Serologies to be obtained today  - MONOCLONAL PROTEIN AND FLC, SERUM; Future  - Beta 2 Microglobulin, Serum; Future  - Comprehensive Metabolic Panel;  Future    -Continue with conservative monitoring with myeloma studies until labs suggest progression of disease and a myeloma defining event occurs    2. Chronic atrial fibrillation, currently anticoagulated with aspirin 81 mg daily, managed by Dr. Bobo Barajas    3. Care plan discussed with patient    I discussed all of the above findings included in the assessment and plan with the patient and the patient is in agreement to move forward with current recommendations/treatment. I have addressed all of their questions and concerns that were verbalized. FOLLOW UP:  Follow-up appointment made for 6 months months, or sooner, if needed  Continue to follow with other medical providers as recommended  Labs at next visit: CBC, CMP and myeloma studies    EMR Dragon/Transcription disclaimer:   Much of this encounter note is an electronic transcription/translation of spoken language to printed text. The electronic translation of spoken language may permit erroneous, or at times, nonsensical words or phrases to be inadvertently transcribed; although attempts have made to review the note for such errors, some may still exist.  Please excuse any unrecognized transcription errors and contact us if the error is unintelligible or needs documented correction. Also, portions of this note have been copied forward, however, changed to reflect the most current clinical status of this patient. Electronically signed by FIORELLA Hoffman on 1/23/2023 at 4:20 PM  Kaylin ORNELAS am pre-charting as a registered nurse for FIORELLA Mireles.

## 2023-01-25 LAB — BETA-2 MICROGLOBULIN: 1.8 MG/L

## 2023-01-26 LAB
+IMM: ABNORMAL
ALBUMIN SERPL-MCNC: 4.25 G/DL (ref 3.75–5.01)
ALPHA-1-GLOBULIN: 0.26 G/DL (ref 0.19–0.46)
ALPHA-2-GLOBULIN: 0.65 G/DL (ref 0.48–1.05)
BETA GLOBULIN: 1.18 G/DL (ref 0.48–1.1)
GAMMA GLOBULIN: 2.06 G/DL (ref 0.62–1.51)
IGA: 46 MG/DL (ref 68–408)
IGG: 2745 MG/DL (ref 768–1632)
IGM: 35 MG/DL (ref 35–263)
KAPPA FREE LIGHT CHAINS QNT: 112.6 MG/L (ref 3.3–19.4)
KAPPA/LAMBDA FREE LIGHT CHAIN RATIO: 13.4 (ref 0.26–1.65)
LAMBDA FREE LIGHT CHAINS QNT: 8.4 MG/L (ref 5.71–26.3)
SPE/IFE INTERPRETATION: ABNORMAL
TOTAL PROTEIN: 8.4 G/DL (ref 6.3–8.2)

## 2023-01-30 ENCOUNTER — OFFICE VISIT (OUTPATIENT)
Dept: PRIMARY CARE CLINIC | Age: 73
End: 2023-01-30
Payer: MEDICARE

## 2023-01-30 VITALS
DIASTOLIC BLOOD PRESSURE: 82 MMHG | HEART RATE: 95 BPM | WEIGHT: 121.6 LBS | TEMPERATURE: 98.5 F | BODY MASS INDEX: 21.54 KG/M2 | SYSTOLIC BLOOD PRESSURE: 160 MMHG | OXYGEN SATURATION: 96 %

## 2023-01-30 DIAGNOSIS — Z11.52 ENCOUNTER FOR SCREENING FOR COVID-19: ICD-10-CM

## 2023-01-30 DIAGNOSIS — J02.9 SORE THROAT: ICD-10-CM

## 2023-01-30 DIAGNOSIS — J06.9 VIRAL URI: Primary | ICD-10-CM

## 2023-01-30 LAB
S PYO AG THROAT QL: NORMAL
SARS-COV-2, PCR: NOT DETECTED

## 2023-01-30 PROCEDURE — 1036F TOBACCO NON-USER: CPT | Performed by: NURSE PRACTITIONER

## 2023-01-30 PROCEDURE — G8420 CALC BMI NORM PARAMETERS: HCPCS | Performed by: NURSE PRACTITIONER

## 2023-01-30 PROCEDURE — 1090F PRES/ABSN URINE INCON ASSESS: CPT | Performed by: NURSE PRACTITIONER

## 2023-01-30 PROCEDURE — 87880 STREP A ASSAY W/OPTIC: CPT | Performed by: NURSE PRACTITIONER

## 2023-01-30 PROCEDURE — G8399 PT W/DXA RESULTS DOCUMENT: HCPCS | Performed by: NURSE PRACTITIONER

## 2023-01-30 PROCEDURE — 3017F COLORECTAL CA SCREEN DOC REV: CPT | Performed by: NURSE PRACTITIONER

## 2023-01-30 PROCEDURE — 99213 OFFICE O/P EST LOW 20 MIN: CPT | Performed by: NURSE PRACTITIONER

## 2023-01-30 PROCEDURE — G8484 FLU IMMUNIZE NO ADMIN: HCPCS | Performed by: NURSE PRACTITIONER

## 2023-01-30 PROCEDURE — 1123F ACP DISCUSS/DSCN MKR DOCD: CPT | Performed by: NURSE PRACTITIONER

## 2023-01-30 PROCEDURE — G8427 DOCREV CUR MEDS BY ELIG CLIN: HCPCS | Performed by: NURSE PRACTITIONER

## 2023-01-30 ASSESSMENT — ENCOUNTER SYMPTOMS
RHINORRHEA: 0
BLOOD IN STOOL: 0
EYE ITCHING: 0
NAUSEA: 0
SORE THROAT: 1
VOMITING: 0
COUGH: 0
DIARRHEA: 0
EYE DISCHARGE: 0
ABDOMINAL PAIN: 0
WHEEZING: 0
COLOR CHANGE: 0
SHORTNESS OF BREATH: 0
SINUS PRESSURE: 0
CONSTIPATION: 0

## 2023-01-30 NOTE — PATIENT INSTRUCTIONS
COVID-19 Information    What are the symptoms of COVID-19? -- Symptoms usually start 4 or 5 days after a person is infected with the virus. But in some people, it can take up to 2 weeks for symptoms to appear. Some people never show symptoms at all. When symptoms do happen, they can include:  ?Fever  ? Cough  ? Trouble breathing  ? Feeling tired  ? Shaking chills  ? Muscle aches  ? Headache  ? Sore throat  ? Runny or stuffy nose  ? Problems with sense of smell or taste    Some people have digestive problems like nausea or diarrhea. There have also been some reports of rashes or other skin symptoms. For example, some people with COVID-19 get reddish-purple spots on their fingers or toes. But it's not clear why or how often this happens. For most people, symptoms will get better within a few days to weeks. But a small number of people get very sick and stop being able to breathe on their own. In severe cases, their organs stop working, which can lead to death. Some people with COVID-19 continue to have some symptoms for weeks or months. This seems to be more likely in people who are sick enough to need to stay in the hospital. But this can also happen in people who did not get very sick. Doctors are still learning about the long-term effects of COVID-19. While children can get COVID-19, they are less likely than adults to have severe symptoms. How is COVID-19 spread? -- The virus that causes COVID-19 mainly spreads from person to person. This usually happens when an infected person coughs, sneezes, or talks near other people. The virus is passed through tiny particles from the infected person's lungs and airway. These particles can easily travel through the air to other people who are nearby. In some cases, like in indoor spaces where the same air keeps being blown around, virus in the particles might be able to spread to other people who are farther away.  The virus can be passed easily between people who live together. But it can also spread at gatherings where people are talking close together, shaking hands, hugging, sharing food, or even singing together. Eating at restaurants raises the risk of infection, since people tend to be close to each other and not covering their faces. Doctors also think it is possible to get infected if you touch a surface that has the virus on it and then touch your mouth, nose, or eyes. However, this is probably not very common. A person can be infected, and spread the virus to others, even without having any symptoms. Can COVID-19 be prevented? -- The best way to prevent COVID-19 is to get vaccinated. People age 11 and older can get a vaccine. People age 15 and older should also get a \"booster\" shot to give them extra protection. Experts believe that vaccines are one of the most important ways to control the COVID-19 pandemic. People who are fully vaccinated are at much lower risk of getting sick from the virus. If you are not yet vaccinated, there are other ways to help protect yourself and others:  ? Practice \"social distancing. \" It's most important to avoid contact with people who are sick. But social distancing also means staying at least 6 feet (about 2 meters) from anyone outside your household. That's because the virus can spread easily through close contact, and it's not always possible to know who is infected. ?Wear a face mask when you need to go be in public around other people. It might also help protect you from others who could be infected. Make sure your mask covers your mouth and nose. ? Wash your hands with soap and water often. This is especially important after being out in public or touching surfaces that many other people also touch, like door handles or railings. The risk of getting infected by touching items like this is probably not very high. Still, it's a good idea to wash your hands often.  This also helps protect you from other illnesses, like the flu or the common cold.  ?Avoid touching your face, especially your mouth, nose, and eyes.    What if I feel fine but think I was exposed? -- If you think you were in close contact with someone with COVID-19, what to do next depends on whether you are vaccinated. It also depends on how long ago you got the vaccine and whether you have had a booster shot. Although people who are fully vaccinated are less likely to get sick and infect others, it can still happen. This is why it's important to take steps to lower this risk.    First, think about these questions:  ?Have you gotten a booster shot?  ?Have you had 2 doses of the Pfizer or Moderna vaccine within the last 6 months?  ?Have you had the Isaiah and Isaiah vaccine within the last 2 months?    If you answered \"yes\" to any of the above questions, experts recommend doing the following after being exposed to someone with COVID-19:  ?You do not need to self-quarantine. But you should wear a mask around all other people for 10 days.  ?If possible, get tested 5 days after the exposure:  If your test is negative, continue to wear a mask around other people until 10 total days have passed  If your test is positive, stay home and \"self-isolate\" for at least 5 days  ?If you start to have symptoms at any point, stay home and get tested again    If you have not been vaccinated at all, or if you answered \"no\" to all of the above questions, experts recommend doing the following:  ?Self-quarantine for 5 days after the exposure. This means staying home and away from other people as much as possible. If you need to be around people, like in your home, wear a mask.  ?If possible, get tested 5 days after the exposure:  If your test is negative, continue to wear a mask around other people until 10 total days have passed  If your test is positive, continue to stay home and \"self-isolate\" for at least another 5 days  ?If you start to have symptoms at any point, stay home and get tested  again  The guidance around what to do after being exposed has changed over time. That's because experts have learned more about the virus and when a person is most likely to infect others. If you are not sure whether you need to self-quarantine, or when you can go back to your normal activities, ask your doctor or nurse. What should I do if someone in my home has COVID-19? -- If someone in your home has COVID-19, there are additional things you can do to protect yourself and others:  ?Keep the sick person away from others - The sick person should stay in a separate room, and use a different bathroom if possible. They should also eat in their own room. Experts also recommend that the person stay away from pets in the house until they are better. ? Have them wear a mask - The sick person should wear a mask when they are in the same room as other people. If they can't wear a mask, you can help protect yourself by covering your face when you are in the room with them. ?Wash hands [employer] your hands with soap and water often. ? Clean often - Here are some specific things that can help:  Wear disposable gloves when you clean. It's also a good idea to wear gloves when you have to touch the sick person's laundry, dishes, utensils, or trash. Wash your hands after removing your gloves. Regularly clean things that are touched a lot. This includes counters, bedside tables, doorknobs, computers, phones, and bathroom surfaces. Clean things in your home with soap and water, but also use disinfectants on appropriate surfaces. Some cleaning products work well to kill bacteria, but not viruses, so it's important to check labels. How is COVID-19 treated? -- Many people will be able to stay home while they get better.  But people with serious symptoms or other health problems might need to go to the hospital.  ?Mild illness - Mild illness means you might have symptoms like fever and cough, but you do not have trouble breathing. Most people with COVID-19 have mild illness and can rest at home until they get better. This usually takes about 2 weeks, but it's not the same for everyone. If you are recovering from COVID-19, it's important to stay home and \"self-isolate\" while you are most likely to spread the virus. Self-isolation means staying apart from other people, even the people you live with. When you can stop self-isolation will depend on how long it has been since you had symptoms. If you are generally healthy and your symptoms are improving (or you don't have symptoms), experts recommend self-isolating for at least 5 days. The 5 days starts the day after you develop symptoms or get tested. After this, you should wear a mask around all other people for 5 more days. If you are not sure how long to self-isolate, or if you still have symptoms after 5 days, talk to your doctor or nurse. You should also check with your doctor or nurse if you have a weakened immune system. If you are at risk for getting seriously ill, doctors might recommend treatment even if you only have mild symptoms. This can lower your risk of getting sicker. Options might include pills that you take for a few days, a treatment called \"monoclonal antibodies\" that is given through an IV or as a shot, and another medicine that is given by IV. Doctors also might recommend being part of a clinical trial. This is a scientific study that tests new medicines to see how well they work. Do not try any new medicines or treatments without talking to a doctor. ? Severe illness - If you have more severe illness with trouble breathing, you might need to stay in the hospital, possibly in the intensive care unit (also called the \"ICU\"). While you are there, you will most likely be in a special isolation room. Only medical staff will be allowed in the room, and they will have to wear special gowns, gloves, masks, and eye protection.   The doctors and nurses can monitor and support your breathing and other body functions and make you as comfortable as possible. You might need extra oxygen to help you breathe easily. If you are having a very hard time breathing, you might need a breathing tube. The tube goes down your throat and into your lungs. It is connected to a machine to help you breathe, called a \"ventilator. \" You might also get medicines that have been shown to help some people with severe COVID-19. COVID Recommendations  Medications such as mucinex, flonase, and claritin may help with symptoms. Use tylenol/motrin as needed for body aches/fevers  To boost your immune system, it is recommended to take Vitamin B, C, D, and zinc.  Zinc  mg daily for 5 days and then decrease to 50 mg a day. Sometimes higher doses may cause nausea. Vitamin C 1000 mg a day  Vitamin D 20 - 25 mcg a day  Take Aspirin 81 mg unless you have an allergy to aspirin or you have stomach issues/bleeding disorders. Delsym is a great OTC cough suppressant. Drink plenty of water to stay hydrated. Get adequate rest. Try lying on your stomach intermittently to help you breathe easier. Get up and move around when you can. Perform deep breathing exercises  Check your oxygen saturation regularly and if below 90%, go to the ER. Go to the ER if you have severe SOA, chest pain, difficulty breathing, or you can not keep your fever below 102F with medications.

## 2023-01-30 NOTE — PROGRESS NOTES
[address] [facility] [address] [place] [address]  Dept: [phone]  Dept Fax: [phone]  Loc: [phone]    Milla Ruffin is a 81 y.o. female who presents today for her medical conditions/complaints as noted below. Milla Ruffin is complaining of Pharyngitis (Started 1/28/2023)        HPI:   Pharyngitis  This is a new problem. The current episode started in the past 7 days (2 days ago). The problem occurs constantly. The problem has been waxing and waning. Associated symptoms include chills, congestion, fatigue, a fever (intermittent, low grade), myalgias and a sore throat. Pertinent negatives include no abdominal pain, chest pain, coughing, headaches, nausea, rash or vomiting. The symptoms are aggravated by swallowing. She has tried acetaminophen for the symptoms. The treatment provided mild relief.    No known COVID or flu exposure recently    Past Medical History:   Diagnosis Date    Anxiety     Elevated blood pressure reading     Elevated serum protein level     GERD (gastroesophageal reflux disease)     History of multiple myeloma     Hypercalcemia     Hyperlipidemia     Monoclonal gammopathy        Past Surgical History:   Procedure Laterality Date    HYSTERECTOMY (CERVIX STATUS UNKNOWN)      TIBIA FRACTURE SURGERY Left     with pradeep in lower left leg       Family History   Problem Relation Age of Onset    High Blood Pressure Mother     Cancer Father     High Cholesterol Brother        Social History     Tobacco Use    Smoking status: Never    Smokeless tobacco: Never   Substance Use Topics    Alcohol use: No        Current Outpatient Medications   Medication Sig Dispense Refill    aspirin EC 81 MG EC tablet Take 1 tablet by mouth daily 90 tablet 1    vitamin B-12 (CYANOCOBALAMIN) 100 MCG tablet Take 50 mcg by mouth daily      docusate sodium (COLACE) 100 MG capsule Take 100 mg by mouth      vitamin D (CHOLECALCIFEROL) 1000 UNIT TABS tablet Take 2,000 Units by mouth daily       Ascorbic Acid (VITAMIN C) 500 MG CAPS Take 1 tablet by mouth daily. melatonin 3 MG TABS tablet Take 3 mg by mouth nightly as needed      L-LYSINE PO Take 1 tablet by mouth daily PRN (Patient not taking: Reported on 1/30/2023)       No current facility-administered medications for this visit. Allergies   Allergen Reactions    Bentyl [Dicyclomine Hcl]      Makes her feel disoriented    Darvocet A500 [Propoxyphene N-Acetaminophen]     Propoxyphene     Sertraline Hcl Other (See Comments)    Albuterol Palpitations    Clarithromycin Palpitations    Lortab [Hydrocodone-Acetaminophen] Nausea Only and Nausea And Vomiting    Penicillins Nausea Only and Nausea And Vomiting     All \"Cillins\"       Health Maintenance   Topic Date Due    COVID-19 Vaccine (1) Never done    Hepatitis C screen  Never done    Shingles vaccine (1 of 2) Never done    DTaP/Tdap/Td vaccine (2 - Td or Tdap) 07/06/2021    Flu vaccine (1) Never done    Colorectal Cancer Screen  09/10/2022    Breast cancer screen  10/19/2022    Depression Screen  10/27/2023    Annual Wellness Visit (AWV)  10/28/2023    Lipids  10/21/2027    DEXA (modify frequency per FRAX score)  Completed    Pneumococcal 65+ years Vaccine  Completed    Hepatitis A vaccine  Aged Out    Hib vaccine  Aged Out    Meningococcal (ACWY) vaccine  Aged Out       Subjective:   Review of Systems   Constitutional:  Positive for chills, fatigue and fever (intermittent, low grade). Negative for activity change and appetite change. HENT:  Positive for congestion and sore throat. Negative for ear pain, rhinorrhea and sinus pressure. Eyes:  Negative for discharge and itching. Respiratory:  Negative for cough, shortness of breath and wheezing. Cardiovascular:  Negative for chest pain. Gastrointestinal:  Negative for abdominal pain, blood in stool, constipation, diarrhea, nausea and vomiting. Musculoskeletal:  Positive for myalgias.    Skin: Negative for color change and rash. Neurological:  Negative for dizziness and headaches. All other systems reviewed and are negative. Objective    Physical Exam  Vitals and nursing note reviewed. Constitutional:       General: She is not in acute distress. Appearance: Normal appearance. HENT:      Head: Normocephalic and atraumatic. Right Ear: Tympanic membrane and ear canal normal.      Left Ear: Tympanic membrane and ear canal normal.      Mouth/Throat:      Mouth: Mucous membranes are moist.      Pharynx: Posterior oropharyngeal erythema present. Comments: Post nasal drip noted    Eyes:      Extraocular Movements: Extraocular movements intact. Pupils: Pupils are equal, round, and reactive to light. Cardiovascular:      Rate and Rhythm: Normal rate and regular rhythm. Pulses: Normal pulses. Heart sounds: Normal heart sounds. No murmur heard. Pulmonary:      Effort: Pulmonary effort is normal. No respiratory distress. Breath sounds: Normal breath sounds. No wheezing. Skin:     General: Skin is warm. Capillary Refill: Capillary refill takes less than 2 seconds. Coloration: Skin is not pale. Findings: No rash. Neurological:      General: No focal deficit present. Mental Status: She is alert and oriented to person, place, and time. Deep Tendon Reflexes: Reflexes are normal and symmetric. Psychiatric:         Attention and Perception: Attention normal.         Mood and Affect: Mood normal.         Behavior: Behavior normal. Behavior is cooperative. Thought Content: Thought content normal.       BP (!) 160/82 (Site: Right Upper Arm, Position: Sitting, Cuff Size: Medium Adult)   Pulse 95   Temp 98.5 °F (36.9 °C) (Temporal)   Wt 121 lb 9.6 oz (55.2 kg)   SpO2 96%   BMI 21.54 kg/m²     Assessment         Diagnosis Orders   1. Viral URI        2. Sore throat  POCT rapid strep A      3.  Encounter for screening for COVID-19  COVID-19 Plan   COVID testing today; will call with results  Quarantine guidelines discussed  Medications such as zyrtec or claritin may help with symptoms. Also use OTC cough medicine like delsym if applicable. Use tylenol/motrin as needed for body aches/fevers unless contraindicated  Multivitamin is recommended to help boost the immune system  Drink plenty of water to stay hydrated. May use humidifier as needed. Allow adequate rest.  Encourage deep breathing exercises  Recommended staying home until fever free for at least 24 hours without medication and symptoms are improving. Go to the ER if you develop severe SOA, chest pain, difficulty breathing, decreased urine output or signs of dehydration, severe abdominal pain or vomiting, or you can not keep the fever below 102F with medications. Orders Placed This Encounter   Procedures    COVID-19     Scheduling Instructions:      1) Due to current limited availability of the COVID-19 test, tests will be prioritized based on responses to questions above. Testing may be delayed due to volume. 2) Print and instruct patient to adhere to CDC home isolation program. (Link Above)              3) Set up or refer patient for a monitoring program.              4) Have patient sign up for and leverage MyChart (if not previously done). Order Specific Question:   Is this test for diagnosis or screening? Answer:   Screening     Order Specific Question:   Symptomatic for COVID-19 as defined by CDC? Answer:   No     Order Specific Question:   Date of Symptom Onset     Answer:   N/A     Order Specific Question:   Hospitalized for COVID-19? Answer:   No     Order Specific Question:   Admitted to ICU for COVID-19? Answer:   No     Order Specific Question:   Employed in healthcare setting? Answer:   Unknown     Order Specific Question:   Resident in a congregate (group) care setting?      Answer:   Unknown     Order Specific Question: Previously tested for COVID-19? Answer:   Yes     Order Specific Question:   Pregnant? Answer:   No    POCT rapid strep A     Results for orders placed or performed in visit on 01/30/23   POCT rapid strep A   Result Value Ref Range    Strep A Ag None Detected None Detected     Return if symptoms worsen or fail to improve. Discussed use, benefits, and side effects of any prescribed medications. All patient questions were answered. Patient voiced understanding of care plan. Patient was given educational materials - see patient instructions below. Patient Instructions   COVID-19 Information    What are the symptoms of COVID-19? -- Symptoms usually start 4 or 5 days after a person is infected with the virus. But in some people, it can take up to 2 weeks for symptoms to appear. Some people never show symptoms at all. When symptoms do happen, they can include:  ?Fever  ? Cough  ? Trouble breathing  ? Feeling tired  ? Shaking chills  ? Muscle aches  ? Headache  ? Sore throat  ? Runny or stuffy nose  ? Problems with sense of smell or taste    Some people have digestive problems like nausea or diarrhea. There have also been some reports of rashes or other skin symptoms. For example, some people with COVID-19 get reddish-purple spots on their fingers or toes. But it's not clear why or how often this happens. For most people, symptoms will get better within a few days to weeks. But a small number of people get very sick and stop being able to breathe on their own. In severe cases, their organs stop working, which can lead to death. Some people with COVID-19 continue to have some symptoms for weeks or months. This seems to be more likely in people who are sick enough to need to stay in the hospital. But this can also happen in people who did not get very sick. Doctors are still learning about the long-term effects of COVID-19.   While children can get COVID-19, they are less likely than adults to have severe symptoms. How is COVID-19 spread? -- The virus that causes COVID-19 mainly spreads from person to person. This usually happens when an infected person coughs, sneezes, or talks near other people. The virus is passed through tiny particles from the infected person's lungs and airway. These particles can easily travel through the air to other people who are nearby. In some cases, like in indoor spaces where the same air keeps being blown around, virus in the particles might be able to spread to other people who are farther away. The virus can be passed easily between people who live together. But it can also spread at gatherings where people are talking close together, shaking hands, hugging, sharing food, or even singing together. Eating at restaurants raises the risk of infection, since people tend to be close to each other and not covering their faces. Doctors also think it is possible to get infected if you touch a surface that has the virus on it and then touch your mouth, nose, or eyes. However, this is probably not very common. A person can be infected, and spread the virus to others, even without having any symptoms. Can COVID-19 be prevented? -- The best way to prevent COVID-19 is to get vaccinated. People age 11 and older can get a vaccine. People age 15 and older should also get a \"booster\" shot to give them extra protection. Experts believe that vaccines are one of the most important ways to control the COVID-19 pandemic. People who are fully vaccinated are at much lower risk of getting sick from the virus. If you are not yet vaccinated, there are other ways to help protect yourself and others:  ? Practice \"social distancing. \" It's most important to avoid contact with people who are sick. But social distancing also means staying at least 6 feet (about 2 meters) from anyone outside your household.  That's because the virus can spread easily through close contact, and it's not always possible to know who is infected. ?Wear a face mask when you need to go be in public around other people. It might also help protect you from others who could be infected. Make sure your mask covers your mouth and nose. ? Wash your hands with soap and water often. This is especially important after being out in public or touching surfaces that many other people also touch, like door handles or railings. The risk of getting infected by touching items like this is probably not very high. Still, it's a good idea to wash your hands often. This also helps protect you from other illnesses, like the flu or the common cold. ? Avoid touching your face, especially your mouth, nose, and eyes. What if I feel fine but think I was exposed? -- If you think you were in close contact with someone with COVID-19, what to do next depends on whether you are vaccinated. It also depends on how long ago you got the vaccine and whether you have had a booster shot. Although people who are fully vaccinated are less likely to get sick and infect others, it can still happen. This is why it's important to take steps to lower this risk. First, think about these questions:  ?Have you gotten a booster shot? ?Have you had 2 doses of the Pfizer or Moderna vaccine within the last 6 months? ?Have you had the [employer] vaccine within the last 2 months? If you answered \"yes\" to any of the above questions, experts recommend doing the following after being exposed to someone with COVID-19:  ?You do not need to self-quarantine. But you should wear a mask around all other people for 10 days. ?If possible, get tested 5 days after the exposure: If your test is negative, continue to wear a mask around other people until 10 total days have passed  If your test is positive, stay home and \"self-isolate\" for at least 5 days  ? If you start to have symptoms at any point, stay home and get tested again    If you have not been vaccinated at all, or if you answered \"no\" to all of the above questions, experts recommend doing the following:  ?Self-quarantine for 5 days after the exposure. This means staying home and away from other people as much as possible. If you need to be around people, like in your home, wear a mask. ?If possible, get tested 5 days after the exposure: If your test is negative, continue to wear a mask around other people until 10 total days have passed  If your test is positive, continue to stay home and \"self-isolate\" for at least another 5 days  ? If you start to have symptoms at any point, stay home and get tested again  The guidance around what to do after being exposed has changed over time. That's because experts have learned more about the virus and when a person is most likely to infect others. If you are not sure whether you need to self-quarantine, or when you can go back to your normal activities, ask your doctor or nurse. What should I do if someone in my home has COVID-19? -- If someone in your home has COVID-19, there are additional things you can do to protect yourself and others:  ?Keep the sick person away from others - The sick person should stay in a separate room, and use a different bathroom if possible. They should also eat in their own room. Experts also recommend that the person stay away from pets in the house until they are better. ? Have them wear a mask - The sick person should wear a mask when they are in the same room as other people. If they can't wear a mask, you can help protect yourself by covering your face when you are in the room with them. ?Wash hands [employer] your hands with soap and water often. ? Clean often - Here are some specific things that can help:  Wear disposable gloves when you clean. It's also a good idea to wear gloves when you have to touch the sick person's laundry, dishes, utensils, or trash. Wash your hands after removing your gloves. Regularly clean things that are touched a lot.  This includes counters, bedside tables, doorknobs, computers, phones, and bathroom surfaces. Clean things in your home with soap and water, but also use disinfectants on appropriate surfaces. Some cleaning products work well to kill bacteria, but not viruses, so it's important to check labels. How is COVID-19 treated? -- Many people will be able to stay home while they get better. But people with serious symptoms or other health problems might need to go to the hospital.  ?Mild illness - Mild illness means you might have symptoms like fever and cough, but you do not have trouble breathing. Most people with COVID-19 have mild illness and can rest at home until they get better. This usually takes about 2 weeks, but it's not the same for everyone. If you are recovering from COVID-19, it's important to stay home and \"self-isolate\" while you are most likely to spread the virus. Self-isolation means staying apart from other people, even the people you live with. When you can stop self-isolation will depend on how long it has been since you had symptoms. If you are generally healthy and your symptoms are improving (or you don't have symptoms), experts recommend self-isolating for at least 5 days. The 5 days starts the day after you develop symptoms or get tested. After this, you should wear a mask around all other people for 5 more days. If you are not sure how long to self-isolate, or if you still have symptoms after 5 days, talk to your doctor or nurse. You should also check with your doctor or nurse if you have a weakened immune system. If you are at risk for getting seriously ill, doctors might recommend treatment even if you only have mild symptoms. This can lower your risk of getting sicker. Options might include pills that you take for a few days, a treatment called \"monoclonal antibodies\" that is given through an IV or as a shot, and another medicine that is given by IV.  Doctors also might recommend being part of a clinical trial. This is a scientific study that tests new medicines to see how well they work. Do not try any new medicines or treatments without talking to a doctor. ? Severe illness - If you have more severe illness with trouble breathing, you might need to stay in the hospital, possibly in the intensive care unit (also called the \"ICU\"). While you are there, you will most likely be in a special isolation room. Only medical staff will be allowed in the room, and they will have to wear special gowns, gloves, masks, and eye protection. The doctors and nurses can monitor and support your breathing and other body functions and make you as comfortable as possible. You might need extra oxygen to help you breathe easily. If you are having a very hard time breathing, you might need a breathing tube. The tube goes down your throat and into your lungs. It is connected to a machine to help you breathe, called a \"ventilator. \" You might also get medicines that have been shown to help some people with severe COVID-19. COVID Recommendations  Medications such as mucinex, flonase, and claritin may help with symptoms. Use tylenol/motrin as needed for body aches/fevers  To boost your immune system, it is recommended to take Vitamin B, C, D, and zinc.  Zinc  mg daily for 5 days and then decrease to 50 mg a day. Sometimes higher doses may cause nausea. Vitamin C 1000 mg a day  Vitamin D 20 - 25 mcg a day  Take Aspirin 81 mg unless you have an allergy to aspirin or you have stomach issues/bleeding disorders. Delsym is a great OTC cough suppressant. Drink plenty of water to stay hydrated. Get adequate rest. Try lying on your stomach intermittently to help you breathe easier. Get up and move around when you can. Perform deep breathing exercises  Check your oxygen saturation regularly and if below 90%, go to the ER.    Go to the ER if you have severe SOA, chest pain, difficulty breathing, or you can not keep your fever below 102F with medications.          Electronically signed by FIORELLA Greer CNP on 1/30/2023 at 2:02 PM

## 2023-02-06 ENCOUNTER — TELEPHONE (OUTPATIENT)
Dept: HEMATOLOGY | Age: 73
End: 2023-02-06

## 2023-02-06 NOTE — TELEPHONE ENCOUNTER
Called patient and reviewed lab results. Instructed that it is showing and  increase M-spike on her myeloma studies and that Gisele would like to see her in 4 months instead of six months. Appointment set up for 05/15 at 1300. Patient v/u and is agreeable to plan.

## 2023-02-06 NOTE — TELEPHONE ENCOUNTER
----- Message from FIORELLA Mendenhall sent at 1/30/2023  9:15 AM CST -----  M spike 2.58 is slowly increasing, continues to have no myeloma defining events. We will continue with conservative monitoring:  We will change follow-up appointment to 4 months versus 6 months    Please change appointment and contact patient

## 2023-02-07 ENCOUNTER — OFFICE VISIT (OUTPATIENT)
Dept: PRIMARY CARE CLINIC | Age: 73
End: 2023-02-07
Payer: MEDICARE

## 2023-02-07 DIAGNOSIS — J06.9 VIRAL UPPER RESPIRATORY TRACT INFECTION: ICD-10-CM

## 2023-02-07 PROCEDURE — 1123F ACP DISCUSS/DSCN MKR DOCD: CPT | Performed by: NURSE PRACTITIONER

## 2023-02-07 PROCEDURE — G8420 CALC BMI NORM PARAMETERS: HCPCS | Performed by: NURSE PRACTITIONER

## 2023-02-07 PROCEDURE — G8484 FLU IMMUNIZE NO ADMIN: HCPCS | Performed by: NURSE PRACTITIONER

## 2023-02-07 PROCEDURE — G8399 PT W/DXA RESULTS DOCUMENT: HCPCS | Performed by: NURSE PRACTITIONER

## 2023-02-07 PROCEDURE — 1036F TOBACCO NON-USER: CPT | Performed by: NURSE PRACTITIONER

## 2023-02-07 PROCEDURE — 3017F COLORECTAL CA SCREEN DOC REV: CPT | Performed by: NURSE PRACTITIONER

## 2023-02-07 PROCEDURE — 99214 OFFICE O/P EST MOD 30 MIN: CPT | Performed by: NURSE PRACTITIONER

## 2023-02-07 PROCEDURE — 1090F PRES/ABSN URINE INCON ASSESS: CPT | Performed by: NURSE PRACTITIONER

## 2023-02-07 PROCEDURE — G8427 DOCREV CUR MEDS BY ELIG CLIN: HCPCS | Performed by: NURSE PRACTITIONER

## 2023-02-07 RX ORDER — BENZONATATE 100 MG/1
100 CAPSULE ORAL 3 TIMES DAILY PRN
Qty: 30 CAPSULE | Refills: 0 | Status: SHIPPED | OUTPATIENT
Start: 2023-02-07 | End: 2023-02-17

## 2023-02-07 SDOH — ECONOMIC STABILITY: INCOME INSECURITY: HOW HARD IS IT FOR YOU TO PAY FOR THE VERY BASICS LIKE FOOD, HOUSING, MEDICAL CARE, AND HEATING?: NOT HARD AT ALL

## 2023-02-07 SDOH — ECONOMIC STABILITY: FOOD INSECURITY: WITHIN THE PAST 12 MONTHS, THE FOOD YOU BOUGHT JUST DIDN'T LAST AND YOU DIDN'T HAVE MONEY TO GET MORE.: NEVER TRUE

## 2023-02-07 SDOH — ECONOMIC STABILITY: FOOD INSECURITY: WITHIN THE PAST 12 MONTHS, YOU WORRIED THAT YOUR FOOD WOULD RUN OUT BEFORE YOU GOT MONEY TO BUY MORE.: NEVER TRUE

## 2023-02-07 SDOH — ECONOMIC STABILITY: HOUSING INSECURITY
IN THE LAST 12 MONTHS, WAS THERE A TIME WHEN YOU DID NOT HAVE A STEADY PLACE TO SLEEP OR SLEPT IN A SHELTER (INCLUDING NOW)?: NO

## 2023-02-07 ASSESSMENT — PATIENT HEALTH QUESTIONNAIRE - PHQ9
SUM OF ALL RESPONSES TO PHQ QUESTIONS 1-9: 0
2. FEELING DOWN, DEPRESSED OR HOPELESS: 0
SUM OF ALL RESPONSES TO PHQ9 QUESTIONS 1 & 2: 0
SUM OF ALL RESPONSES TO PHQ QUESTIONS 1-9: 0
SUM OF ALL RESPONSES TO PHQ QUESTIONS 1-9: 0
1. LITTLE INTEREST OR PLEASURE IN DOING THINGS: 0
SUM OF ALL RESPONSES TO PHQ QUESTIONS 1-9: 0

## 2023-02-07 ASSESSMENT — ENCOUNTER SYMPTOMS
CHEST TIGHTNESS: 0
VOMITING: 0
DIARRHEA: 0
SHORTNESS OF BREATH: 0
NAUSEA: 0
ABDOMINAL PAIN: 0
COUGH: 1
SORE THROAT: 1
COLOR CHANGE: 0

## 2023-02-07 NOTE — PROGRESS NOTES
2023     Estephania Nurse (:  1950) is a 81 y.o. female,Established patient, here for evaluation of the following chief complaint(s):  Cough and Pharyngitis      ASSESSMENT/PLAN:  1. Viral upper respiratory tract infection  Assessment & Plan:   Patient here today for concerns of cough and sore throat that began a week and a half ago. She was tested for COVID and strep. Both of which were negative. She denies history of asthma, but does note environmental allergies. She states that she has been having what she classifies as severe coughing episodes. She has been using Delsym with little relief. She reports multiple allergies to antibiotics, so would like to hold off for now. Additionally she is allergic to Prednisone. Will treat with Tessalon Perles, in addition to recommendation to increase water intake, use of a cool mist humidifier and Flonase. She will call or return in one week if not improved. Orders:  -     benzonatate (TESSALON) 100 MG capsule; Take 1 capsule by mouth 3 times daily as needed for Cough, Disp-30 capsule, R-0Normal      Return if symptoms worsen or fail to improve. SUBJECTIVE/OBJECTIVE:  HPI    Prior to Visit Medications    Medication Sig Taking? Authorizing Provider   benzonatate (TESSALON) 100 MG capsule Take 1 capsule by mouth 3 times daily as needed for Cough Yes FIORELLA Vicente - CNP   aspirin EC 81 MG EC tablet Take 1 tablet by mouth daily Yes Bandar Hillman MD   vitamin B-12 (CYANOCOBALAMIN) 100 MCG tablet Take 50 mcg by mouth daily Yes Historical Provider, MD   docusate sodium (COLACE) 100 MG capsule Take 100 mg by mouth Yes Historical Provider, MD   vitamin D (CHOLECALCIFEROL) 1000 UNIT TABS tablet Take 2,000 Units by mouth daily  Yes Historical Provider, MD   Ascorbic Acid (VITAMIN C) 500 MG CAPS Take 1 tablet by mouth daily.  Yes Historical Provider, MD   melatonin 3 MG TABS tablet Take 3 mg by mouth nightly as needed Yes Historical Provider, MD   L-LYSINE PO Take 1 tablet by mouth daily PRN Yes Historical Provider, MD       Review of Systems   Constitutional:  Positive for fatigue. Negative for activity change and fever. HENT:  Positive for ear pain, postnasal drip and sore throat. Negative for congestion. Respiratory:  Positive for cough. Negative for chest tightness and shortness of breath. Cardiovascular:  Negative for chest pain. Gastrointestinal:  Negative for abdominal pain, diarrhea, nausea and vomiting. Genitourinary:  Negative for frequency and urgency. Musculoskeletal:  Negative for arthralgias and myalgias. Skin:  Negative for color change. Neurological:  Negative for dizziness, weakness and numbness. Psychiatric/Behavioral:  Negative for agitation. The patient is not nervous/anxious. There were no vitals taken for this visit. Physical Exam  Constitutional:       Appearance: Normal appearance. HENT:      Right Ear: Tympanic membrane normal.      Left Ear: Tympanic membrane normal.      Mouth/Throat:      Mouth: Mucous membranes are moist.      Pharynx: Oropharynx is clear. Posterior oropharyngeal erythema (post nasal drip noted) present. Cardiovascular:      Rate and Rhythm: Normal rate and regular rhythm. Pulmonary:      Effort: Pulmonary effort is normal.      Breath sounds: Normal breath sounds. Neurological:      Mental Status: She is alert.        Electronically signed by FIORELLA Winn CNP on 2/7/2023 at 1:28 PM.

## 2023-02-07 NOTE — ASSESSMENT & PLAN NOTE
Patient here today for concerns of cough and sore throat that began a week and a half ago. She was tested for COVID and strep. Both of which were negative. She denies history of asthma, but does note environmental allergies. She states that she has been having what she classifies as severe coughing episodes. She has been using Delsym with little relief. She reports multiple allergies to antibiotics, so would like to hold off for now. Additionally she is allergic to Prednisone. Will treat with Tessalon Perles, in addition to recommendation to increase water intake, use of a cool mist humidifier and Flonase. She will call or return in one week if not improved.

## 2023-02-07 NOTE — PATIENT INSTRUCTIONS
Increase water intake  Tessalon Perles as needed for cough  Use of a cool mist humidifier at night.    Flonase nasal spray

## 2023-02-09 NOTE — PROGRESS NOTES
09-Feb-2023 17:05 Non-medical: Not on file   Tobacco Use    Smoking status: Never Smoker    Smokeless tobacco: Never Used   Substance and Sexual Activity    Alcohol use: No    Drug use: No    Sexual activity: Not on file   Lifestyle    Physical activity:     Days per week: Not on file     Minutes per session: Not on file    Stress: Not on file   Relationships    Social connections:     Talks on phone: Not on file     Gets together: Not on file     Attends Quaker service: Not on file     Active member of club or organization: Not on file     Attends meetings of clubs or organizations: Not on file     Relationship status: Not on file    Intimate partner violence:     Fear of current or ex partner: Not on file     Emotionally abused: Not on file     Physically abused: Not on file     Forced sexual activity: Not on file   Other Topics Concern    Not on file   Social History Narrative    Not on file        Substance Abuse Interventions:  · No further action needed. Health Risk Assessment:     General  In general, how would you say your health is?: Fair  In the past 7 days, have you experienced any of the following? New or Increased Pain, New or Increased Fatigue, Loneliness, Social Isolation, Stress or Anger?: None of These  Do you get the social and emotional support that you need?: Yes  Do you have a Living Will?: Yes  General Health Risk Interventions:  · No further action needed. Health Habits/Nutrition  Do you exercise for at least 20 minutes 2-3 times per week?: Yes  Have you lost any weight without trying in the past 3 months?: No  Do you eat fewer than 2 meals per day?: No  Have you seen a dentist within the past year?: (!) No  Body mass index is 20.59 kg/m².   Health Habits/Nutrition Interventions:  · Encouraged to see dentist.     Ruben Gr you or your family notice any trouble with your hearing?: No  Do you have difficulty driving, watching TV, or doing any of your daily activities because of your

## 2023-05-15 ENCOUNTER — HOSPITAL ENCOUNTER (OUTPATIENT)
Dept: INFUSION THERAPY | Age: 73
Discharge: HOME | End: 2023-05-15
Payer: MEDICARE

## 2023-05-15 ENCOUNTER — OFFICE VISIT (OUTPATIENT)
Dept: HEMATOLOGY | Age: 73
End: 2023-05-15
Payer: MEDICARE

## 2023-05-15 VITALS
SYSTOLIC BLOOD PRESSURE: 156 MMHG | OXYGEN SATURATION: 98 % | WEIGHT: 121 LBS | HEART RATE: 80 BPM | DIASTOLIC BLOOD PRESSURE: 82 MMHG | BODY MASS INDEX: 21.44 KG/M2 | HEIGHT: 63 IN

## 2023-05-15 DIAGNOSIS — D47.2 SMOLDERING MULTIPLE MYELOMA: ICD-10-CM

## 2023-05-15 DIAGNOSIS — Z71.89 CARE PLAN DISCUSSED WITH PATIENT: ICD-10-CM

## 2023-05-15 DIAGNOSIS — I48.20 CHRONIC ATRIAL FIBRILLATION (HCC): ICD-10-CM

## 2023-05-15 DIAGNOSIS — D47.2 SMOLDERING MULTIPLE MYELOMA: Primary | ICD-10-CM

## 2023-05-15 LAB
ALBUMIN SERPL-MCNC: 4.4 G/DL (ref 3.5–5.2)
ALP SERPL-CCNC: 88 U/L (ref 35–104)
ALT SERPL-CCNC: 50 U/L (ref 9–52)
ANION GAP SERPL CALCULATED.3IONS-SCNC: 8 MMOL/L (ref 7–19)
AST SERPL-CCNC: 36 U/L (ref 14–36)
BASOPHILS # BLD: 0.06 K/UL (ref 0.01–0.08)
BASOPHILS NFR BLD: 0.9 % (ref 0.1–1.2)
BILIRUB SERPL-MCNC: <0.2 MG/DL (ref 0.2–1.3)
BUN SERPL-MCNC: 17 MG/DL (ref 7–17)
CALCIUM SERPL-MCNC: 10.1 MG/DL (ref 8.4–10.2)
CHLORIDE SERPL-SCNC: 102 MMOL/L (ref 98–111)
CO2 SERPL-SCNC: 31 MMOL/L (ref 22–29)
CREAT SERPL-MCNC: 0.8 MG/DL (ref 0.5–1)
EOSINOPHIL # BLD: 0.23 K/UL (ref 0.04–0.54)
EOSINOPHIL NFR BLD: 3.6 % (ref 0.7–7)
ERYTHROCYTE [DISTWIDTH] IN BLOOD BY AUTOMATED COUNT: 12.7 % (ref 11.7–14.4)
GLOBULIN: 5.1 G/DL
GLUCOSE SERPL-MCNC: 96 MG/DL (ref 74–106)
HCT VFR BLD AUTO: 37.7 % (ref 34.1–44.9)
HGB BLD-MCNC: 12.6 G/DL (ref 11.2–15.7)
LYMPHOCYTES # BLD: 2.28 K/UL (ref 1.18–3.74)
LYMPHOCYTES NFR BLD: 35.5 % (ref 19.3–53.1)
MCH RBC QN AUTO: 31.3 PG (ref 25.6–32.2)
MCHC RBC AUTO-ENTMCNC: 33.4 G/DL (ref 32.3–35.5)
MCV RBC AUTO: 93.8 FL (ref 79.4–94.8)
MONOCYTES # BLD: 0.36 K/UL (ref 0.24–0.82)
MONOCYTES NFR BLD: 5.6 % (ref 4.7–12.5)
NEUTROPHILS # BLD: 3.49 K/UL (ref 1.56–6.13)
NEUTS SEG NFR BLD: 54.2 % (ref 34–71.1)
PLATELET # BLD AUTO: 254 K/UL (ref 182–369)
PMV BLD AUTO: 10.7 FL (ref 7.4–10.4)
POTASSIUM SERPL-SCNC: 4.6 MMOL/L (ref 3.5–5.1)
PROT SERPL-MCNC: 9.4 G/DL (ref 6.3–8.2)
RBC # BLD AUTO: 4.02 M/UL (ref 3.93–5.22)
SODIUM SERPL-SCNC: 141 MMOL/L (ref 137–145)
WBC # BLD AUTO: 6.43 K/UL (ref 3.98–10.04)

## 2023-05-15 PROCEDURE — 1090F PRES/ABSN URINE INCON ASSESS: CPT | Performed by: NURSE PRACTITIONER

## 2023-05-15 PROCEDURE — 99212 OFFICE O/P EST SF 10 MIN: CPT

## 2023-05-15 PROCEDURE — G8399 PT W/DXA RESULTS DOCUMENT: HCPCS | Performed by: NURSE PRACTITIONER

## 2023-05-15 PROCEDURE — 1123F ACP DISCUSS/DSCN MKR DOCD: CPT | Performed by: NURSE PRACTITIONER

## 2023-05-15 PROCEDURE — 36415 COLL VENOUS BLD VENIPUNCTURE: CPT

## 2023-05-15 PROCEDURE — 85025 COMPLETE CBC W/AUTO DIFF WBC: CPT

## 2023-05-15 PROCEDURE — 80053 COMPREHEN METABOLIC PANEL: CPT

## 2023-05-15 PROCEDURE — G8427 DOCREV CUR MEDS BY ELIG CLIN: HCPCS | Performed by: NURSE PRACTITIONER

## 2023-05-15 PROCEDURE — 99213 OFFICE O/P EST LOW 20 MIN: CPT | Performed by: NURSE PRACTITIONER

## 2023-05-15 PROCEDURE — 3017F COLORECTAL CA SCREEN DOC REV: CPT | Performed by: NURSE PRACTITIONER

## 2023-05-15 PROCEDURE — G8420 CALC BMI NORM PARAMETERS: HCPCS | Performed by: NURSE PRACTITIONER

## 2023-05-15 PROCEDURE — 1036F TOBACCO NON-USER: CPT | Performed by: NURSE PRACTITIONER

## 2023-05-15 ASSESSMENT — ENCOUNTER SYMPTOMS: SHORTNESS OF BREATH: 1

## 2023-05-17 ASSESSMENT — ENCOUNTER SYMPTOMS
EYE REDNESS: 0
GASTROINTESTINAL NEGATIVE: 1
BACK PAIN: 0
DIARRHEA: 0
EYE DISCHARGE: 0
BLOOD IN STOOL: 0
CONSTIPATION: 0
EYES NEGATIVE: 1
ABDOMINAL PAIN: 0
VOMITING: 0
WHEEZING: 0
EYE PAIN: 0
SORE THROAT: 0
COUGH: 0
NAUSEA: 0

## 2023-05-18 LAB — B2 MICROGLOB SERPL-MCNC: 1.8 MG/L

## 2023-05-19 LAB
ALBUMIN SERPL-MCNC: 4.3 G/DL (ref 3.75–5.01)
ALPHA1 GLOB SERPL ELPH-MCNC: 0.26 G/DL (ref 0.19–0.46)
ALPHA2 GLOB SERPL ELPH-MCNC: 0.65 G/DL (ref 0.48–1.05)
B-GLOBULIN SERPL ELPH-MCNC: 2.01 G/DL (ref 0.48–1.1)
DEPRECATED KAPPA LC FREE/LAMBDA SER: 13.72 {RATIO} (ref 0.26–1.65)
GAMMA GLOB SERPL ELPH-MCNC: 1.28 G/DL (ref 0.62–1.51)
IGA SERPL-MCNC: 54 MG/DL (ref 68–408)
IGG SERPL-MCNC: 2697 MG/DL (ref 768–1632)
IGM SERPL-MCNC: 37 MG/DL (ref 35–263)
INTERPRETATION SERPL IFE-IMP: ABNORMAL
INTERPRETATION SERPL IFE-IMP: ABNORMAL
KAPPA LC FREE SER-MCNC: 121.14 MG/L (ref 3.3–19.4)
LAMBDA LC FREE SERPL-MCNC: 8.83 MG/L (ref 5.71–26.3)
PROT SERPL-MCNC: 8.5 G/DL (ref 6.3–8.2)

## 2023-05-25 ENCOUNTER — TELEPHONE (OUTPATIENT)
Dept: HEMATOLOGY | Age: 73
End: 2023-05-25

## 2023-05-25 NOTE — TELEPHONE ENCOUNTER
Called patient regarding lab results. Instructed that her M spike has gone up slightly to 2.64 and that [employer] would like for her to get a skeletal survey (last one was in 2019) and a 24 hour urine for SPEP. Patient states that she no longer has a supplemental insurance due to premium going so high and cannot afford that 20% that is will cost her at this time to do these test.  States that she is trying to find another supplemental insurance and will call when to scheduled these when she has it in place.

## 2023-05-25 NOTE — TELEPHONE ENCOUNTER
----- Message from FIORELLA Quiros sent at 5/25/2023  9:07 AM CDT -----  Please call Silvia Terrell and request that she have a skeletal survey repeated, last completed in 2019. Her M spike increased just a little bit but has not yet reached 3.      Please order if she agrees

## 2023-06-07 DIAGNOSIS — M72.2 PLANTAR FASCIITIS OF LEFT FOOT: Primary | ICD-10-CM

## 2023-08-31 DIAGNOSIS — F41.9 ANXIETY: ICD-10-CM

## 2023-08-31 RX ORDER — ALPRAZOLAM 0.5 MG/1
TABLET ORAL
Qty: 60 TABLET | Refills: 2 | Status: SHIPPED | OUTPATIENT
Start: 2023-08-31 | End: 2023-09-30

## 2023-08-31 NOTE — TELEPHONE ENCOUNTER
Kirsten Sylviauzma called to request a refill on her medication. Last office visit : 2/7/2023   Next office visit : 11/2/2023     Last UDS:   Amphetamine Screen, Urine   Date Value Ref Range Status   08/15/2019 neg  Final     Barbiturate Screen, Urine   Date Value Ref Range Status   08/15/2019 neg  Final     Benzodiazepine Screen, Urine   Date Value Ref Range Status   08/15/2019 positive  Final     Buprenorphine Urine   Date Value Ref Range Status   08/15/2019 neg  Final     Cocaine Metabolite Screen, Urine   Date Value Ref Range Status   08/15/2019 neg  Final     Gabapentin Screen, Urine   Date Value Ref Range Status   08/15/2019 neg  Final     MDMA, Urine   Date Value Ref Range Status   08/15/2019 neg  Final     Methamphetamine, Urine   Date Value Ref Range Status   08/15/2019 neg  Final     Opiate Scrn, Ur   Date Value Ref Range Status   08/15/2019 neg  Final     Oxycodone Screen, Ur   Date Value Ref Range Status   08/15/2019 neg  Final     PCP Screen, Urine   Date Value Ref Range Status   08/15/2019 neg  Final     Propoxyphene Screen, Urine   Date Value Ref Range Status   08/15/2019 neg  Final     THC Screen, Urine   Date Value Ref Range Status   08/15/2019 neg  Final     Tricyclic Antidepressants, Urine   Date Value Ref Range Status   08/15/2019 neg  Final       Last Roxanne Alfredo: 8/31/23  Medication Contract: na   Last Fill:     Requested Prescriptions     Pending Prescriptions Disp Refills    ALPRAZolam (XANAX) 0.5 MG tablet 60 tablet 0     Sig: TAKE ONE TABLET BY MOUTH TWICE A DAY AS NEEDED FOR SLEEP OR ANXIETY **MAY MAKE DROWSY**         Please approve or refuse this medication.    Armando Sánchez MA

## 2023-10-26 DIAGNOSIS — E78.2 MIXED HYPERLIPIDEMIA: ICD-10-CM

## 2023-10-26 DIAGNOSIS — E55.9 VITAMIN D DEFICIENCY: ICD-10-CM

## 2023-10-26 DIAGNOSIS — C90.00 MULTIPLE MYELOMA, REMISSION STATUS UNSPECIFIED (HCC): Primary | ICD-10-CM

## 2023-10-27 DIAGNOSIS — C90.00 MULTIPLE MYELOMA, REMISSION STATUS UNSPECIFIED (HCC): ICD-10-CM

## 2023-10-27 DIAGNOSIS — E78.2 MIXED HYPERLIPIDEMIA: ICD-10-CM

## 2023-10-27 DIAGNOSIS — E55.9 VITAMIN D DEFICIENCY: ICD-10-CM

## 2023-10-27 LAB
25(OH)D3 SERPL-MCNC: 21 NG/ML
ALBUMIN SERPL-MCNC: 4.3 G/DL (ref 3.5–5.2)
ALP SERPL-CCNC: 83 U/L (ref 35–104)
ALT SERPL-CCNC: 46 U/L (ref 5–33)
ANION GAP SERPL CALCULATED.3IONS-SCNC: 7 MMOL/L (ref 7–19)
AST SERPL-CCNC: 40 U/L (ref 5–32)
BASOPHILS # BLD: 0 K/UL (ref 0–0.2)
BASOPHILS NFR BLD: 0.7 % (ref 0–1)
BILIRUB SERPL-MCNC: 0.6 MG/DL (ref 0.2–1.2)
BUN SERPL-MCNC: 13 MG/DL (ref 8–23)
CALCIUM SERPL-MCNC: 10.3 MG/DL (ref 8.8–10.2)
CHLORIDE SERPL-SCNC: 99 MMOL/L (ref 98–111)
CHOLEST SERPL-MCNC: 205 MG/DL (ref 160–199)
CO2 SERPL-SCNC: 30 MMOL/L (ref 22–29)
CREAT SERPL-MCNC: 0.5 MG/DL (ref 0.5–0.9)
EOSINOPHIL # BLD: 0.3 K/UL (ref 0–0.6)
EOSINOPHIL NFR BLD: 4.2 % (ref 0–5)
ERYTHROCYTE [DISTWIDTH] IN BLOOD BY AUTOMATED COUNT: 12.3 % (ref 11.5–14.5)
GLUCOSE SERPL-MCNC: 90 MG/DL (ref 74–109)
HCT VFR BLD AUTO: 39.2 % (ref 37–47)
HDLC SERPL-MCNC: 75 MG/DL (ref 65–121)
HGB BLD-MCNC: 13.5 G/DL (ref 12–16)
IMM GRANULOCYTES # BLD: 0 K/UL
LDLC SERPL CALC-MCNC: 114 MG/DL
LYMPHOCYTES # BLD: 2.4 K/UL (ref 1.1–4.5)
LYMPHOCYTES NFR BLD: 39.6 % (ref 20–40)
MCH RBC QN AUTO: 32.1 PG (ref 27–31)
MCHC RBC AUTO-ENTMCNC: 34.4 G/DL (ref 33–37)
MCV RBC AUTO: 93.1 FL (ref 81–99)
MONOCYTES # BLD: 0.5 K/UL (ref 0–0.9)
MONOCYTES NFR BLD: 9 % (ref 0–10)
NEUTROPHILS # BLD: 2.8 K/UL (ref 1.5–7.5)
NEUTS SEG NFR BLD: 46.3 % (ref 50–65)
PLATELET # BLD AUTO: 349 K/UL (ref 130–400)
PMV BLD AUTO: 10.3 FL (ref 9.4–12.3)
POTASSIUM SERPL-SCNC: 4 MMOL/L (ref 3.5–5)
PROT SERPL-MCNC: 8.6 G/DL (ref 6.6–8.7)
RBC # BLD AUTO: 4.21 M/UL (ref 4.2–5.4)
SODIUM SERPL-SCNC: 136 MMOL/L (ref 136–145)
TRIGL SERPL-MCNC: 79 MG/DL (ref 0–149)
WBC # BLD AUTO: 6 K/UL (ref 4.8–10.8)

## 2023-11-02 ENCOUNTER — OFFICE VISIT (OUTPATIENT)
Dept: PRIMARY CARE CLINIC | Age: 73
End: 2023-11-02
Payer: MEDICARE

## 2023-11-02 VITALS
HEIGHT: 63 IN | OXYGEN SATURATION: 98 % | HEART RATE: 73 BPM | SYSTOLIC BLOOD PRESSURE: 156 MMHG | DIASTOLIC BLOOD PRESSURE: 84 MMHG | BODY MASS INDEX: 21.44 KG/M2 | WEIGHT: 121 LBS | TEMPERATURE: 97.2 F

## 2023-11-02 DIAGNOSIS — E88.09 HYPERPROTEINEMIA: ICD-10-CM

## 2023-11-02 DIAGNOSIS — Z00.00 MEDICARE ANNUAL WELLNESS VISIT, SUBSEQUENT: Primary | ICD-10-CM

## 2023-11-02 DIAGNOSIS — E78.2 MIXED HYPERLIPIDEMIA: ICD-10-CM

## 2023-11-02 DIAGNOSIS — F41.9 ANXIETY: ICD-10-CM

## 2023-11-02 DIAGNOSIS — R06.02 SHORTNESS OF BREATH: ICD-10-CM

## 2023-11-02 DIAGNOSIS — E55.9 VITAMIN D DEFICIENCY: ICD-10-CM

## 2023-11-02 DIAGNOSIS — Z78.0 POSTMENOPAUSE: ICD-10-CM

## 2023-11-02 DIAGNOSIS — Z12.11 ENCOUNTER FOR SCREENING FOR MALIGNANT NEOPLASM OF COLON: ICD-10-CM

## 2023-11-02 DIAGNOSIS — E83.52 HYPERCALCEMIA: ICD-10-CM

## 2023-11-02 DIAGNOSIS — C90.00 MULTIPLE MYELOMA, REMISSION STATUS UNSPECIFIED (HCC): ICD-10-CM

## 2023-11-02 DIAGNOSIS — Z12.31 ENCOUNTER FOR SCREENING MAMMOGRAM FOR BREAST CANCER: ICD-10-CM

## 2023-11-02 DIAGNOSIS — I48.0 PAROXYSMAL A-FIB (HCC): ICD-10-CM

## 2023-11-02 PROBLEM — J06.9 VIRAL UPPER RESPIRATORY TRACT INFECTION: Status: RESOLVED | Noted: 2023-02-07 | Resolved: 2023-11-02

## 2023-11-02 PROCEDURE — G8399 PT W/DXA RESULTS DOCUMENT: HCPCS | Performed by: FAMILY MEDICINE

## 2023-11-02 PROCEDURE — G8420 CALC BMI NORM PARAMETERS: HCPCS | Performed by: FAMILY MEDICINE

## 2023-11-02 PROCEDURE — 3017F COLORECTAL CA SCREEN DOC REV: CPT | Performed by: FAMILY MEDICINE

## 2023-11-02 PROCEDURE — G8427 DOCREV CUR MEDS BY ELIG CLIN: HCPCS | Performed by: FAMILY MEDICINE

## 2023-11-02 PROCEDURE — 1123F ACP DISCUSS/DSCN MKR DOCD: CPT | Performed by: FAMILY MEDICINE

## 2023-11-02 PROCEDURE — G0439 PPPS, SUBSEQ VISIT: HCPCS | Performed by: FAMILY MEDICINE

## 2023-11-02 PROCEDURE — 1090F PRES/ABSN URINE INCON ASSESS: CPT | Performed by: FAMILY MEDICINE

## 2023-11-02 PROCEDURE — 99214 OFFICE O/P EST MOD 30 MIN: CPT | Performed by: FAMILY MEDICINE

## 2023-11-02 PROCEDURE — G8484 FLU IMMUNIZE NO ADMIN: HCPCS | Performed by: FAMILY MEDICINE

## 2023-11-02 PROCEDURE — 1036F TOBACCO NON-USER: CPT | Performed by: FAMILY MEDICINE

## 2023-11-02 RX ORDER — ALPRAZOLAM 0.5 MG/1
TABLET ORAL
Qty: 60 TABLET | Refills: 2 | Status: CANCELLED | OUTPATIENT
Start: 2023-11-02 | End: 2023-12-02

## 2023-11-02 ASSESSMENT — PATIENT HEALTH QUESTIONNAIRE - PHQ9
SUM OF ALL RESPONSES TO PHQ QUESTIONS 1-9: 1
1. LITTLE INTEREST OR PLEASURE IN DOING THINGS: 0
SUM OF ALL RESPONSES TO PHQ QUESTIONS 1-9: 1
SUM OF ALL RESPONSES TO PHQ9 QUESTIONS 1 & 2: 1
2. FEELING DOWN, DEPRESSED OR HOPELESS: 1

## 2023-11-02 NOTE — PATIENT INSTRUCTIONS
We are committed to providing you with the best care possible. In order to help us achieve these goals please remember to bring all medications, herbal products, and over the counter supplements with you to each visit. If your provider has ordered testing for you, please be sure to follow up with our office if you have not received results within 7 days after the testing took place. *If you receive a survey after visiting one of our offices, please take time to share your experience concerning your physician office visit. These surveys are confidential and no health information about you is shared. We are eager to improve for you and we are counting on your feedback to help make that happen. Learning About Stress  What is stress? Stress is your body's response to a hard situation. Your body can have a physical, emotional, or mental response. Stress is a fact of life for most people, and it affects everyone differently. What causes stress for you may not be stressful for someone else. A lot of things can cause stress. You may feel stress when you go on a job interview, take a test, or run a race. This kind of short-term stress is normal and even useful. It can help you if you need to work hard or react quickly. For example, stress can help you finish an important job on time. Long-term stress is caused by ongoing stressful situations or events. Examples of long-term stress include long-term health problems, ongoing problems at work, or conflicts in your family. Long-term stress can harm your health. How does stress affect your health? When you are stressed, your body responds as though you are in danger. It makes hormones that speed up your heart, make you breathe faster, and give you a burst of energy. This is called the fight-or-flight stress response. If the stress is over quickly, your body goes back to normal and no harm is done.   But if stress happens too often or lasts too long, it

## 2023-11-08 DIAGNOSIS — Z12.11 ENCOUNTER FOR SCREENING FOR MALIGNANT NEOPLASM OF COLON: ICD-10-CM

## 2023-11-08 LAB
REASON FOR REJECTION: NORMAL
REJECTED TEST: NORMAL

## 2023-11-13 ASSESSMENT — ENCOUNTER SYMPTOMS
ANAL BLEEDING: 0
NAUSEA: 0
ABDOMINAL PAIN: 0
CONSTIPATION: 0
COUGH: 0
CHEST TIGHTNESS: 0
SHORTNESS OF BREATH: 1
DIARRHEA: 0

## 2023-11-17 ENCOUNTER — HOSPITAL ENCOUNTER (OUTPATIENT)
Dept: INFUSION THERAPY | Age: 73
Discharge: HOME | End: 2023-11-17
Payer: MEDICARE

## 2023-11-17 ENCOUNTER — OFFICE VISIT (OUTPATIENT)
Dept: HEMATOLOGY | Age: 73
End: 2023-11-17
Payer: MEDICARE

## 2023-11-17 VITALS
HEIGHT: 63 IN | TEMPERATURE: 97.7 F | OXYGEN SATURATION: 98 % | WEIGHT: 122 LBS | HEART RATE: 72 BPM | BODY MASS INDEX: 21.62 KG/M2 | DIASTOLIC BLOOD PRESSURE: 90 MMHG | SYSTOLIC BLOOD PRESSURE: 152 MMHG

## 2023-11-17 DIAGNOSIS — D47.2 SMOLDERING MULTIPLE MYELOMA: ICD-10-CM

## 2023-11-17 DIAGNOSIS — D47.2 SMOLDERING MULTIPLE MYELOMA: Primary | ICD-10-CM

## 2023-11-17 DIAGNOSIS — Z71.89 CARE PLAN DISCUSSED WITH PATIENT: ICD-10-CM

## 2023-11-17 DIAGNOSIS — I48.20 CHRONIC ATRIAL FIBRILLATION (HCC): ICD-10-CM

## 2023-11-17 LAB
ALBUMIN SERPL-MCNC: 4 G/DL (ref 3.5–5.2)
ALP SERPL-CCNC: 95 U/L (ref 35–104)
ALT SERPL-CCNC: 44 U/L (ref 9–52)
ANION GAP SERPL CALCULATED.3IONS-SCNC: 11 MMOL/L (ref 7–19)
AST SERPL-CCNC: 35 U/L (ref 14–36)
BASOPHILS # BLD: 0.05 K/UL (ref 0.01–0.08)
BASOPHILS NFR BLD: 0.8 % (ref 0.1–1.2)
BILIRUB SERPL-MCNC: 0.5 MG/DL (ref 0.2–1.3)
BUN SERPL-MCNC: 16 MG/DL (ref 7–17)
CALCIUM SERPL-MCNC: 10.5 MG/DL (ref 8.4–10.2)
CHLORIDE SERPL-SCNC: 103 MMOL/L (ref 98–111)
CO2 SERPL-SCNC: 27 MMOL/L (ref 22–29)
CREAT SERPL-MCNC: 0.7 MG/DL (ref 0.5–1)
EOSINOPHIL # BLD: 0.26 K/UL (ref 0.04–0.54)
EOSINOPHIL NFR BLD: 4 % (ref 0.7–7)
ERYTHROCYTE [DISTWIDTH] IN BLOOD BY AUTOMATED COUNT: 12.1 % (ref 11.7–14.4)
GLOBULIN: 3.2 G/DL
GLUCOSE SERPL-MCNC: 103 MG/DL (ref 74–106)
HCT VFR BLD AUTO: 39.4 % (ref 34.1–44.9)
HGB BLD-MCNC: 13.7 G/DL (ref 11.2–15.7)
LYMPHOCYTES # BLD: 2.21 K/UL (ref 1.18–3.74)
LYMPHOCYTES NFR BLD: 34.4 % (ref 19.3–53.1)
MCH RBC QN AUTO: 31.4 PG (ref 25.6–32.2)
MCHC RBC AUTO-ENTMCNC: 34.8 G/DL (ref 32.3–35.5)
MCV RBC AUTO: 90.4 FL (ref 79.4–94.8)
MONOCYTES # BLD: 0.42 K/UL (ref 0.24–0.82)
MONOCYTES NFR BLD: 6.5 % (ref 4.7–12.5)
NEUTROPHILS # BLD: 3.47 K/UL (ref 1.56–6.13)
NEUTS SEG NFR BLD: 54.1 % (ref 34–71.1)
PLATELET # BLD AUTO: 346 K/UL (ref 182–369)
PMV BLD AUTO: 9.9 FL (ref 7.4–10.4)
POTASSIUM SERPL-SCNC: 4.6 MMOL/L (ref 3.5–5.1)
PROT SERPL-MCNC: 9.5 G/DL (ref 6.3–8.2)
RBC # BLD AUTO: 4.36 M/UL (ref 3.93–5.22)
SODIUM SERPL-SCNC: 141 MMOL/L (ref 137–145)
WBC # BLD AUTO: 6.42 K/UL (ref 3.98–10.04)

## 2023-11-17 PROCEDURE — 1123F ACP DISCUSS/DSCN MKR DOCD: CPT | Performed by: NURSE PRACTITIONER

## 2023-11-17 PROCEDURE — 1036F TOBACCO NON-USER: CPT | Performed by: NURSE PRACTITIONER

## 2023-11-17 PROCEDURE — G8427 DOCREV CUR MEDS BY ELIG CLIN: HCPCS | Performed by: NURSE PRACTITIONER

## 2023-11-17 PROCEDURE — 85025 COMPLETE CBC W/AUTO DIFF WBC: CPT

## 2023-11-17 PROCEDURE — 36415 COLL VENOUS BLD VENIPUNCTURE: CPT

## 2023-11-17 PROCEDURE — 99213 OFFICE O/P EST LOW 20 MIN: CPT | Performed by: NURSE PRACTITIONER

## 2023-11-17 PROCEDURE — 3017F COLORECTAL CA SCREEN DOC REV: CPT | Performed by: NURSE PRACTITIONER

## 2023-11-17 PROCEDURE — G8399 PT W/DXA RESULTS DOCUMENT: HCPCS | Performed by: NURSE PRACTITIONER

## 2023-11-17 PROCEDURE — G8484 FLU IMMUNIZE NO ADMIN: HCPCS | Performed by: NURSE PRACTITIONER

## 2023-11-17 PROCEDURE — 1090F PRES/ABSN URINE INCON ASSESS: CPT | Performed by: NURSE PRACTITIONER

## 2023-11-17 PROCEDURE — 99212 OFFICE O/P EST SF 10 MIN: CPT

## 2023-11-17 PROCEDURE — 80053 COMPREHEN METABOLIC PANEL: CPT

## 2023-11-17 PROCEDURE — G8420 CALC BMI NORM PARAMETERS: HCPCS | Performed by: NURSE PRACTITIONER

## 2023-11-17 NOTE — PROGRESS NOTES
Progress Note      Pt Name: Phil Madsen  YOB: 1950  MRN: [id number]    Date of evaluation: 11/17/2023  History Obtained From:  patient, electronic medical record    CHIEF COMPLAINT:    Chief Complaint   Patient presents with    Follow-up     Smoldering multiple myeloma     HISTORY OF PRESENT ILLNESS:    Phil Madsen is a 72 y.o.  female who is currently being followed for smoldering multiple myeloma, IgG kappa restricted, initially diagnosed November 2016, with no known myeloma defining events and yet to require intervention. Current recommendation is for routine surveillance and lab monitoring. Myeloma studies on 5/15/2023 were overall stable. She returns today in follow-up, laboratory and further treatment recommendations. Today's clinic visit to include physical assessment, review of systems, any lab or radiographic findings that were available and plan of care are documented below. ONCOLOGIC HISTORY:   Diagnosis  Smoldering multiple myeloma, November 2016  IgG kappa restricted     Treatment summary  Close laboratory/clinical surveillance     Sahil Erickson was seen on initial hematologic consultation by Doni Knight on 8/4/2016 as a referral by Dr. Robert Ruby for evaluation of a monoclonal protein finding. She was seen by her primary care for a annual visit in July 2016 and was found to have a elevated protein at 9.3 and mildly elevated calcium levels at 10.7. This prompted a workup with serum protein electrophoresis which showed a M spike of 2, also confirmed by us on 8/4/2016. K/L light chains ratio 4.08. IgG elevated at 2258 mg/dl. IgA low at 67 mg/dl (immuneparesis). Beta-2 microglobulin was 1.5. She complained of fatigue but has never had any fever no chills no night history of repeated infections, no weight loss. She had occasional back pain but denied any other bone pain. She also complained of occasional abdominal pain due to her inflammatory bowel syndrome.  She has no

## 2023-11-20 LAB
ALBUMIN SERPL-MCNC: 4.56 G/DL (ref 3.75–5.01)
ALPHA1 GLOB SERPL ELPH-MCNC: 0.28 G/DL (ref 0.19–0.46)
ALPHA2 GLOB SERPL ELPH-MCNC: 0.71 G/DL (ref 0.48–1.05)
B-GLOBULIN SERPL ELPH-MCNC: 1.94 G/DL (ref 0.48–1.1)
B2 MICROGLOB SERPL-MCNC: 1.8 MG/L
DEPRECATED KAPPA LC FREE/LAMBDA SER: 13.65 {RATIO} (ref 0.26–1.65)
EER MONOCLONAL PROTEIN AND FLC, SERUM: ABNORMAL
GAMMA GLOB SERPL ELPH-MCNC: 1.61 G/DL (ref 0.62–1.51)
IGA SERPL-MCNC: 49 MG/DL (ref 68–408)
IGG SERPL-MCNC: 2740 MG/DL (ref 768–1632)
IGM SERPL-MCNC: 41 MG/DL (ref 35–263)
INTERPRETATION SERPL IFE-IMP: ABNORMAL
INTERPRETATION SERPL IFE-IMP: ABNORMAL
KAPPA LC FREE SER-MCNC: 110.04 MG/L (ref 3.3–19.4)
LAMBDA LC FREE SERPL-MCNC: 8.06 MG/L (ref 5.71–26.3)
MONOCLONAL PROTEIN, SERUM: 2.83 G/DL
PROT SERPL-MCNC: 9.1 G/DL (ref 6.3–8.2)

## 2023-12-09 ASSESSMENT — ENCOUNTER SYMPTOMS
EYE PAIN: 0
GASTROINTESTINAL NEGATIVE: 1
BLOOD IN STOOL: 0
VOMITING: 0
EYES NEGATIVE: 1
WHEEZING: 0
SHORTNESS OF BREATH: 0
COUGH: 0
RESPIRATORY NEGATIVE: 1
CONSTIPATION: 0
NAUSEA: 0
ABDOMINAL PAIN: 0
EYE REDNESS: 0
SORE THROAT: 0
DIARRHEA: 0
BACK PAIN: 0
EYE DISCHARGE: 0

## 2024-03-13 ENCOUNTER — TELEPHONE (OUTPATIENT)
Dept: HEMATOLOGY | Age: 74
End: 2024-03-13

## 2024-03-13 NOTE — TELEPHONE ENCOUNTER
Called Patient and reminded patient of their appointment on 03/15/2024 and patient confirmed they would be here.Called pt on there mobile phone with number [phone] as listed in chart.

## 2024-03-14 DIAGNOSIS — D47.2 SMOLDERING MULTIPLE MYELOMA: Primary | ICD-10-CM

## 2024-03-15 ENCOUNTER — OFFICE VISIT (OUTPATIENT)
Dept: HEMATOLOGY | Age: 74
End: 2024-03-15
Payer: MEDICARE

## 2024-03-15 ENCOUNTER — HOSPITAL ENCOUNTER (OUTPATIENT)
Dept: INFUSION THERAPY | Age: 74
Discharge: HOME | End: 2024-03-15
Payer: MEDICARE

## 2024-03-15 VITALS
BODY MASS INDEX: 21.79 KG/M2 | DIASTOLIC BLOOD PRESSURE: 90 MMHG | OXYGEN SATURATION: 99 % | HEART RATE: 66 BPM | SYSTOLIC BLOOD PRESSURE: 142 MMHG | TEMPERATURE: 98.9 F | WEIGHT: 123 LBS | HEIGHT: 63 IN

## 2024-03-15 DIAGNOSIS — D47.2 SMOLDERING MULTIPLE MYELOMA: ICD-10-CM

## 2024-03-15 DIAGNOSIS — D47.2 SMOLDERING MULTIPLE MYELOMA: Primary | ICD-10-CM

## 2024-03-15 DIAGNOSIS — I48.20 CHRONIC ATRIAL FIBRILLATION (HCC): ICD-10-CM

## 2024-03-15 LAB
ALBUMIN SERPL-MCNC: 4.7 G/DL (ref 3.5–5.2)
ALP SERPL-CCNC: 82 U/L (ref 35–104)
ALT SERPL-CCNC: 38 U/L (ref 9–52)
ANION GAP SERPL CALCULATED.3IONS-SCNC: 10 MMOL/L (ref 7–19)
AST SERPL-CCNC: 31 U/L (ref 14–36)
BASOPHILS # BLD: 0.06 K/UL (ref 0.01–0.08)
BASOPHILS NFR BLD: 0.8 % (ref 0.1–1.2)
BILIRUB SERPL-MCNC: 0.3 MG/DL (ref 0.2–1.3)
BUN SERPL-MCNC: 15 MG/DL (ref 7–17)
CALCIUM SERPL-MCNC: 10 MG/DL (ref 8.4–10.2)
CHLORIDE SERPL-SCNC: 99 MMOL/L (ref 98–111)
CO2 SERPL-SCNC: 28 MMOL/L (ref 22–29)
CREAT SERPL-MCNC: 0.7 MG/DL (ref 0.5–1)
EOSINOPHIL # BLD: 0.23 K/UL (ref 0.04–0.54)
EOSINOPHIL NFR BLD: 3 % (ref 0.7–7)
ERYTHROCYTE [DISTWIDTH] IN BLOOD BY AUTOMATED COUNT: 12.2 % (ref 11.7–14.4)
GLOBULIN: 4.6 G/DL
GLUCOSE SERPL-MCNC: 94 MG/DL (ref 74–106)
HCT VFR BLD AUTO: 38.1 % (ref 34.1–44.9)
HGB BLD-MCNC: 12.9 G/DL (ref 11.2–15.7)
LYMPHOCYTES # BLD: 2.32 K/UL (ref 1.18–3.74)
LYMPHOCYTES NFR BLD: 30.2 % (ref 19.3–53.1)
MCH RBC QN AUTO: 31 PG (ref 25.6–32.2)
MCHC RBC AUTO-ENTMCNC: 33.9 G/DL (ref 32.3–35.5)
MCV RBC AUTO: 91.6 FL (ref 79.4–94.8)
MONOCYTES # BLD: 0.37 K/UL (ref 0.24–0.82)
MONOCYTES NFR BLD: 4.8 % (ref 4.7–12.5)
NEUTROPHILS # BLD: 4.69 K/UL (ref 1.56–6.13)
NEUTS SEG NFR BLD: 60.9 % (ref 34–71.1)
PLATELET # BLD AUTO: 334 K/UL (ref 182–369)
PMV BLD AUTO: 9.9 FL (ref 7.4–10.4)
POTASSIUM SERPL-SCNC: 4.2 MMOL/L (ref 3.5–5.1)
PROT SERPL-MCNC: 9.3 G/DL (ref 6.3–8.2)
RBC # BLD AUTO: 4.16 M/UL (ref 3.93–5.22)
SODIUM SERPL-SCNC: 137 MMOL/L (ref 137–145)
WBC # BLD AUTO: 7.69 K/UL (ref 3.98–10.04)

## 2024-03-15 PROCEDURE — G8399 PT W/DXA RESULTS DOCUMENT: HCPCS | Performed by: NURSE PRACTITIONER

## 2024-03-15 PROCEDURE — 3017F COLORECTAL CA SCREEN DOC REV: CPT | Performed by: NURSE PRACTITIONER

## 2024-03-15 PROCEDURE — 85025 COMPLETE CBC W/AUTO DIFF WBC: CPT

## 2024-03-15 PROCEDURE — G8484 FLU IMMUNIZE NO ADMIN: HCPCS | Performed by: NURSE PRACTITIONER

## 2024-03-15 PROCEDURE — 99213 OFFICE O/P EST LOW 20 MIN: CPT | Performed by: NURSE PRACTITIONER

## 2024-03-15 PROCEDURE — 99212 OFFICE O/P EST SF 10 MIN: CPT

## 2024-03-15 PROCEDURE — 80053 COMPREHEN METABOLIC PANEL: CPT

## 2024-03-15 PROCEDURE — 1090F PRES/ABSN URINE INCON ASSESS: CPT | Performed by: NURSE PRACTITIONER

## 2024-03-15 PROCEDURE — G8427 DOCREV CUR MEDS BY ELIG CLIN: HCPCS | Performed by: NURSE PRACTITIONER

## 2024-03-15 PROCEDURE — 1036F TOBACCO NON-USER: CPT | Performed by: NURSE PRACTITIONER

## 2024-03-15 PROCEDURE — G8420 CALC BMI NORM PARAMETERS: HCPCS | Performed by: NURSE PRACTITIONER

## 2024-03-15 PROCEDURE — 36415 COLL VENOUS BLD VENIPUNCTURE: CPT

## 2024-03-15 PROCEDURE — 1123F ACP DISCUSS/DSCN MKR DOCD: CPT | Performed by: NURSE PRACTITIONER

## 2024-03-15 NOTE — PROGRESS NOTES
disoriented    Darvocet A500 [Propoxyphene N-Acetaminophen]     Mucinex [Guaifenesin Er] Headaches    Predicort [Prednisolone]     Propoxyphene     Sertraline Hcl Other (See Comments)    Albuterol Palpitations    Clarithromycin Palpitations    Lortab [Hydrocodone-Acetaminophen] Nausea Only and Nausea And Vomiting    Penicillins Nausea Only and Nausea And Vomiting     All \"Cillins\"       Social History:    Social History     Tobacco Use    Smoking status: Never    Smokeless tobacco: Never   Vaping Use    Vaping Use: Never used   Substance Use Topics    Alcohol use: No    Drug use: No       Family History:   Family History   Problem Relation Age of Onset    High Blood Pressure Mother     Cancer Father     High Cholesterol Brother     No Known Problems Maternal Grandmother     No Known Problems Maternal Grandfather     No Known Problems Paternal Grandmother     No Known Problems Paternal Grandfather        Vitals: Elevated BP due to anticipatory anxiety  Vitals:    03/15/24 1320   BP: (!) 142/90   Pulse: 66   Temp: 98.9 °F (37.2 °C)   SpO2: 99%   Weight: 55.8 kg (123 lb)   Height: 1.6 m (5' 3\")        Subjective   REVIEW OF SYSTEMS:   Review of Systems   Constitutional:  Positive for fatigue. Negative for chills, diaphoresis and fever.   HENT: Negative.  Negative for congestion, ear pain, hearing loss, nosebleeds, sore throat and tinnitus.    Eyes: Negative.  Negative for pain, discharge and redness.   Respiratory: Negative.  Negative for cough, shortness of breath and wheezing.    Cardiovascular: Negative.  Negative for chest pain, palpitations and leg swelling.   Gastrointestinal: Negative.  Negative for abdominal pain, blood in stool, constipation, diarrhea, nausea and vomiting.   Endocrine: Negative for polydipsia.   Genitourinary:  Negative for dysuria, flank pain, frequency, hematuria and urgency.   Musculoskeletal: Negative.  Negative for back pain, myalgias and neck pain.   Skin: Negative.  Negative for rash.

## 2024-03-17 LAB — B2 MICROGLOB SERPL-MCNC: 1.7 MG/L

## 2024-03-18 LAB
ALBUMIN SERPL-MCNC: 4.27 G/DL (ref 3.75–5.01)
ALPHA1 GLOB SERPL ELPH-MCNC: 0.29 G/DL (ref 0.19–0.46)
ALPHA2 GLOB SERPL ELPH-MCNC: 0.71 G/DL (ref 0.48–1.05)
B-GLOBULIN SERPL ELPH-MCNC: 1.32 G/DL (ref 0.48–1.1)
DEPRECATED KAPPA LC FREE/LAMBDA SER: 11.98 {RATIO} (ref 0.26–1.65)
EER MONOCLONAL PROTEIN AND FLC, SERUM: ABNORMAL
GAMMA GLOB SERPL ELPH-MCNC: 2 G/DL (ref 0.62–1.51)
IGA SERPL-MCNC: 52 MG/DL (ref 68–408)
IGG SERPL-MCNC: 2573 MG/DL (ref 768–1632)
IGM SERPL-MCNC: 32 MG/DL (ref 35–263)
INTERPRETATION SERPL IFE-IMP: ABNORMAL
INTERPRETATION SERPL IFE-IMP: ABNORMAL
KAPPA LC FREE SER-MCNC: 101.57 MG/L (ref 3.3–19.4)
LAMBDA LC FREE SERPL-MCNC: 8.48 MG/L (ref 5.71–26.3)
MONOCLONAL PROTEIN, SERUM: 2.67 G/DL
PROT SERPL-MCNC: 8.6 G/DL (ref 6.3–8.2)

## 2024-03-21 ASSESSMENT — ENCOUNTER SYMPTOMS
RESPIRATORY NEGATIVE: 1
SORE THROAT: 0
EYES NEGATIVE: 1
CONSTIPATION: 0
WHEEZING: 0
BACK PAIN: 0
ABDOMINAL PAIN: 0
VOMITING: 0
EYE DISCHARGE: 0
NAUSEA: 0
BLOOD IN STOOL: 0
EYE REDNESS: 0
GASTROINTESTINAL NEGATIVE: 1
DIARRHEA: 0
COUGH: 0

## 2024-05-08 ENCOUNTER — OFFICE VISIT (OUTPATIENT)
Dept: PRIMARY CARE CLINIC | Age: 74
End: 2024-05-08

## 2024-05-08 VITALS
TEMPERATURE: 97.4 F | OXYGEN SATURATION: 97 % | BODY MASS INDEX: 21.97 KG/M2 | HEIGHT: 63 IN | DIASTOLIC BLOOD PRESSURE: 68 MMHG | HEART RATE: 84 BPM | WEIGHT: 124 LBS | SYSTOLIC BLOOD PRESSURE: 138 MMHG

## 2024-05-08 DIAGNOSIS — E55.9 VITAMIN D DEFICIENCY: ICD-10-CM

## 2024-05-08 DIAGNOSIS — Z13.220 SCREENING FOR HYPERLIPIDEMIA: ICD-10-CM

## 2024-05-08 DIAGNOSIS — C90.00 MULTIPLE MYELOMA, REMISSION STATUS UNSPECIFIED (HCC): ICD-10-CM

## 2024-05-08 DIAGNOSIS — F41.9 ANXIETY: ICD-10-CM

## 2024-05-08 DIAGNOSIS — Z00.00 ENCOUNTER FOR MEDICAL EXAMINATION TO ESTABLISH CARE: Primary | ICD-10-CM

## 2024-05-08 DIAGNOSIS — Z79.899 DRUG THERAPY: ICD-10-CM

## 2024-05-08 DIAGNOSIS — Z12.31 ENCOUNTER FOR SCREENING MAMMOGRAM FOR MALIGNANT NEOPLASM OF BREAST: ICD-10-CM

## 2024-05-08 SDOH — ECONOMIC STABILITY: FOOD INSECURITY: WITHIN THE PAST 12 MONTHS, YOU WORRIED THAT YOUR FOOD WOULD RUN OUT BEFORE YOU GOT MONEY TO BUY MORE.: NEVER TRUE

## 2024-05-08 SDOH — ECONOMIC STABILITY: FOOD INSECURITY: WITHIN THE PAST 12 MONTHS, THE FOOD YOU BOUGHT JUST DIDN'T LAST AND YOU DIDN'T HAVE MONEY TO GET MORE.: NEVER TRUE

## 2024-05-08 SDOH — ECONOMIC STABILITY: INCOME INSECURITY: HOW HARD IS IT FOR YOU TO PAY FOR THE VERY BASICS LIKE FOOD, HOUSING, MEDICAL CARE, AND HEATING?: NOT HARD AT ALL

## 2024-05-08 ASSESSMENT — PATIENT HEALTH QUESTIONNAIRE - PHQ9
SUM OF ALL RESPONSES TO PHQ QUESTIONS 1-9: 2
SUM OF ALL RESPONSES TO PHQ QUESTIONS 1-9: 2
SUM OF ALL RESPONSES TO PHQ9 QUESTIONS 1 & 2: 2
SUM OF ALL RESPONSES TO PHQ QUESTIONS 1-9: 2
SUM OF ALL RESPONSES TO PHQ QUESTIONS 1-9: 2
1. LITTLE INTEREST OR PLEASURE IN DOING THINGS: SEVERAL DAYS
2. FEELING DOWN, DEPRESSED OR HOPELESS: SEVERAL DAYS

## 2024-05-09 RX ORDER — ALPRAZOLAM 0.5 MG/1
0.5 TABLET ORAL DAILY PRN
Qty: 30 TABLET | Refills: 0 | Status: SHIPPED | OUTPATIENT
Start: 2024-05-09 | End: 2024-06-08

## 2024-05-09 NOTE — PROGRESS NOTES
Ms.Bonny Francis is a 73 y.o. female who presents today for  Chief Complaint   Patient presents with    Established New Doctor       HPI:  Previous patient of Dr. Stephani Montgomery, here today for the purpose of establishing care.  She reports she is doing well without any noted complaints or concerns.    Patient takes prescribed Xanax for anxiety as needed    She states she prefers not to do cologuard due to inconsistent bowel movements.  She states in the past she had to wait for a more formed stool, but that the company continued to reach out asking for her sample.  She does not want to do a colonoscopy either based on past experiences.    Patient's last complete mammogram was 10/19/2020.-Patient is due    Patient with smoldering multiple myeloma and is followed by FIORELLA Velasquez. Her next scheduled follow up is 07/18.    Review of Systems    Past Medical History:   Diagnosis Date    Anxiety     Elevated blood pressure reading     Elevated serum protein level     GERD (gastroesophageal reflux disease)     History of multiple myeloma     Hypercalcemia     Hyperlipidemia     Monoclonal gammopathy        Current Outpatient Medications   Medication Sig Dispense Refill    ALPRAZolam (XANAX) 0.5 MG tablet Take 1 tablet by mouth daily as needed for Anxiety for up to 30 days. Max Daily Amount: 0.5 mg 30 tablet 0    aspirin EC 81 MG EC tablet Take 1 tablet by mouth daily 90 tablet 1    vitamin B-12 (CYANOCOBALAMIN) 100 MCG tablet Take 0.5 tablets by mouth daily      docusate sodium (COLACE) 100 MG capsule Take 1 capsule by mouth      vitamin D (CHOLECALCIFEROL) 1000 UNIT TABS tablet Take 2 tablets by mouth daily      Ascorbic Acid (VITAMIN C) 500 MG CAPS Take 1 tablet by mouth daily.      melatonin 3 MG TABS tablet Take 1 tablet by mouth nightly as needed      L-LYSINE PO Take 1 tablet by mouth daily PRN       No current facility-administered medications for this visit.       Allergies   Allergen Reactions    Prednisone

## 2024-07-15 ENCOUNTER — TELEPHONE (OUTPATIENT)
Dept: HEMATOLOGY | Age: 74
End: 2024-07-15

## 2024-07-15 NOTE — TELEPHONE ENCOUNTER
Called and left detailed vm about appt. date and time on 07/18/24. I left message to come at appt. time & not any earlier & we would gladly take care of everything at that time of their follow up appt. time. Left number to call in case they can't make this appt. and needed to r/s.

## 2024-07-17 DIAGNOSIS — D47.2 SMOLDERING MULTIPLE MYELOMA: Primary | ICD-10-CM

## 2024-07-17 NOTE — PROGRESS NOTES
Progress Note      Pt Name: Renee Francis  YOB: 1950  MRN: [id number]    Date of evaluation: 07/18/2024  History Obtained From:  patient, electronic medical record    CHIEF COMPLAINT:    Chief Complaint   Patient presents with    Follow-up     Smoldering multiple myeloma     HISTORY OF PRESENT ILLNESS:    Renee Francis is a 73 y.o.  female who is currently being followed for smoldering multiple myeloma, IgG kappa restricted, initially diagnosed November 2016, with no known myeloma defining events and yet to require intervention.  Current recommendation is for routine surveillance and lab monitoring.  Myeloma studies on 3/15/2024 were overall stable with no significant new concerning.  Renee returns today in follow-up, laboratory and further treatment recommendations.    Today's clinic visit to include physical assessment, review of systems, any lab or radiographic findings that were available and plan of care are documented below.    ONCOLOGIC HISTORY:   Diagnosis  Smoldering multiple myeloma, November 2016  IgG kappa restricted     Treatment summary  Close laboratory/clinical surveillance     Renee was seen on initial hematologic consultation by  on 8/4/2016 as a referral by Dr. Stephani Montgomery for evaluation of a monoclonal protein finding. She was seen by her primary care for a annual visit in July 2016 and was found to have a elevated protein at 9.3 and mildly elevated calcium levels at 10.7.     This prompted a workup with serum protein electrophoresis which showed a M spike of 2, also confirmed by us on 8/4/2016. K/L light chains ratio 4.08. IgG elevated at 2258 mg/dl. IgA low at 67 mg/dl (immuneparesis). Beta-2 microglobulin was 1.5. She complained of fatigue but has never had any fever no chills no night history of repeated infections, no weight loss. She had occasional back pain but denied any other bone pain. She also complained of occasional abdominal pain due to her

## 2024-07-18 ENCOUNTER — HOSPITAL ENCOUNTER (OUTPATIENT)
Dept: INFUSION THERAPY | Age: 74
Discharge: HOME | End: 2024-07-18
Payer: MEDICARE

## 2024-07-18 ENCOUNTER — TELEPHONE (OUTPATIENT)
Dept: PRIMARY CARE CLINIC | Age: 74
End: 2024-07-18

## 2024-07-18 ENCOUNTER — OFFICE VISIT (OUTPATIENT)
Dept: HEMATOLOGY | Age: 74
End: 2024-07-18
Payer: MEDICARE

## 2024-07-18 VITALS
HEIGHT: 63 IN | OXYGEN SATURATION: 99 % | SYSTOLIC BLOOD PRESSURE: 132 MMHG | DIASTOLIC BLOOD PRESSURE: 70 MMHG | TEMPERATURE: 98 F | HEART RATE: 88 BPM | WEIGHT: 123.9 LBS | BODY MASS INDEX: 21.95 KG/M2

## 2024-07-18 DIAGNOSIS — D47.2 SMOLDERING MULTIPLE MYELOMA: ICD-10-CM

## 2024-07-18 DIAGNOSIS — I48.20 CHRONIC ATRIAL FIBRILLATION (HCC): ICD-10-CM

## 2024-07-18 DIAGNOSIS — Z71.89 CARE PLAN DISCUSSED WITH PATIENT: ICD-10-CM

## 2024-07-18 DIAGNOSIS — D47.2 SMOLDERING MULTIPLE MYELOMA: Primary | ICD-10-CM

## 2024-07-18 LAB
ALBUMIN SERPL-MCNC: 4.3 G/DL (ref 3.5–5.2)
ALP SERPL-CCNC: 93 U/L (ref 35–104)
ALT SERPL-CCNC: 37 U/L (ref 9–52)
ANION GAP SERPL CALCULATED.3IONS-SCNC: 10 MMOL/L (ref 7–19)
AST SERPL-CCNC: 31 U/L (ref 14–36)
BASOPHILS # BLD: 0.05 K/UL (ref 0.01–0.08)
BASOPHILS NFR BLD: 0.8 % (ref 0.1–1.2)
BILIRUB SERPL-MCNC: 0.6 MG/DL (ref 0.2–1.3)
BUN SERPL-MCNC: 16 MG/DL (ref 7–17)
CALCIUM SERPL-MCNC: 9.7 MG/DL (ref 8.4–10.2)
CHLORIDE SERPL-SCNC: 102 MMOL/L (ref 98–111)
CO2 SERPL-SCNC: 29 MMOL/L (ref 22–29)
CREAT SERPL-MCNC: 0.8 MG/DL (ref 0.5–1)
EOSINOPHIL # BLD: 0.19 K/UL (ref 0.04–0.54)
EOSINOPHIL NFR BLD: 3.1 % (ref 0.7–7)
ERYTHROCYTE [DISTWIDTH] IN BLOOD BY AUTOMATED COUNT: 12.2 % (ref 11.7–14.4)
GLOBULIN: 4.2 G/DL
GLUCOSE SERPL-MCNC: 100 MG/DL (ref 74–106)
HCT VFR BLD AUTO: 35.2 % (ref 34.1–44.9)
HGB BLD-MCNC: 12.4 G/DL (ref 11.2–15.7)
LYMPHOCYTES # BLD: 2.09 K/UL (ref 1.18–3.74)
LYMPHOCYTES NFR BLD: 34.4 % (ref 19.3–53.1)
MCH RBC QN AUTO: 32.2 PG (ref 25.6–32.2)
MCHC RBC AUTO-ENTMCNC: 35.2 G/DL (ref 32.3–35.5)
MCV RBC AUTO: 91.4 FL (ref 79.4–94.8)
MONOCYTES # BLD: 0.39 K/UL (ref 0.24–0.82)
MONOCYTES NFR BLD: 6.4 % (ref 4.7–12.5)
NEUTROPHILS # BLD: 3.35 K/UL (ref 1.56–6.13)
NEUTS SEG NFR BLD: 55.1 % (ref 34–71.1)
PLATELET # BLD AUTO: 317 K/UL (ref 182–369)
PMV BLD AUTO: 10 FL (ref 7.4–10.4)
POTASSIUM SERPL-SCNC: 4.1 MMOL/L (ref 3.5–5.1)
PROT SERPL-MCNC: 8.5 G/DL (ref 6.3–8.2)
RBC # BLD AUTO: 3.85 M/UL (ref 3.93–5.22)
SODIUM SERPL-SCNC: 141 MMOL/L (ref 137–145)
WBC # BLD AUTO: 6.08 K/UL (ref 3.98–10.04)

## 2024-07-18 PROCEDURE — 1036F TOBACCO NON-USER: CPT | Performed by: NURSE PRACTITIONER

## 2024-07-18 PROCEDURE — 85025 COMPLETE CBC W/AUTO DIFF WBC: CPT

## 2024-07-18 PROCEDURE — 80053 COMPREHEN METABOLIC PANEL: CPT

## 2024-07-18 PROCEDURE — G8427 DOCREV CUR MEDS BY ELIG CLIN: HCPCS | Performed by: NURSE PRACTITIONER

## 2024-07-18 PROCEDURE — 99213 OFFICE O/P EST LOW 20 MIN: CPT | Performed by: NURSE PRACTITIONER

## 2024-07-18 PROCEDURE — 1123F ACP DISCUSS/DSCN MKR DOCD: CPT | Performed by: NURSE PRACTITIONER

## 2024-07-18 PROCEDURE — 3017F COLORECTAL CA SCREEN DOC REV: CPT | Performed by: NURSE PRACTITIONER

## 2024-07-18 PROCEDURE — 36415 COLL VENOUS BLD VENIPUNCTURE: CPT

## 2024-07-18 PROCEDURE — 1090F PRES/ABSN URINE INCON ASSESS: CPT | Performed by: NURSE PRACTITIONER

## 2024-07-18 PROCEDURE — G8420 CALC BMI NORM PARAMETERS: HCPCS | Performed by: NURSE PRACTITIONER

## 2024-07-18 PROCEDURE — 99212 OFFICE O/P EST SF 10 MIN: CPT

## 2024-07-18 PROCEDURE — G8399 PT W/DXA RESULTS DOCUMENT: HCPCS | Performed by: NURSE PRACTITIONER

## 2024-07-18 RX ORDER — ALPRAZOLAM 0.5 MG/1
0.25 TABLET ORAL NIGHTLY PRN
COMMUNITY
End: 2024-07-19 | Stop reason: SDUPTHER

## 2024-07-18 RX ORDER — ALPRAZOLAM 0.5 MG/1
0.25 TABLET ORAL NIGHTLY PRN
Status: CANCELLED | OUTPATIENT
Start: 2024-07-18

## 2024-07-18 NOTE — TELEPHONE ENCOUNTER
Patient calling 7/18/24 requesting a refill of their ALPRAZolam (XANAX) .5 MG medication. Patient would like it sent to [facility] - [address]. [address] -  [phone] - F [phone] .

## 2024-07-18 NOTE — TELEPHONE ENCOUNTER
Renee Bolton called to request a refill on her medication.      Last office visit : 5/8/2024   Next office visit : 8/8/2024     Last UDS:   Benzodiazepine Screen, Urine   Date Value Ref Range Status   05/08/2024 neg  Final     Buprenorphine Urine   Date Value Ref Range Status   05/08/2024 neg  Final     Cocaine Metabolite Screen, Urine   Date Value Ref Range Status   05/08/2024 neg  Final     Gabapentin Screen, Urine   Date Value Ref Range Status   05/08/2024 neg  Final     MDMA, Urine   Date Value Ref Range Status   05/08/2024 neg  Final     Oxycodone Screen, Ur   Date Value Ref Range Status   05/08/2024 neg  Final     Propoxyphene Screen, Urine   Date Value Ref Range Status   05/08/2024 neg  Final     THC Screen, Urine   Date Value Ref Range Status   05/08/2024 neg  Final     Tricyclic Antidepressants, Urine   Date Value Ref Range Status   05/08/2024 neg  Final       Last Matthieu: DUE  Medication Contract: 11/23   Last Fill: 5/9/24    Requested Prescriptions     Pending Prescriptions Disp Refills    ALPRAZolam (XANAX) 0.5 MG tablet       Sig: Take 0.5 tablets by mouth nightly as needed for Sleep. Max Daily Amount: 0.25 mg         Please approve or refuse this medication.   Virginia Bolden MA

## 2024-07-19 DIAGNOSIS — F41.9 ANXIETY: Primary | ICD-10-CM

## 2024-07-19 RX ORDER — ALPRAZOLAM 0.5 MG/1
0.25 TABLET ORAL NIGHTLY PRN
Qty: 15 TABLET | Refills: 0 | Status: SHIPPED | OUTPATIENT
Start: 2024-07-19 | End: 2024-08-18

## 2024-07-20 ASSESSMENT — ENCOUNTER SYMPTOMS
EYE PAIN: 0
RESPIRATORY NEGATIVE: 1
SHORTNESS OF BREATH: 0
EYE REDNESS: 0
EYE DISCHARGE: 0
COUGH: 0
GASTROINTESTINAL NEGATIVE: 1
ABDOMINAL PAIN: 0
CONSTIPATION: 0
DIARRHEA: 0
EYES NEGATIVE: 1
NAUSEA: 0
BACK PAIN: 0
VOMITING: 0
BLOOD IN STOOL: 0
WHEEZING: 0
SORE THROAT: 0

## 2024-07-21 LAB — B2 MICROGLOB SERPL-MCNC: 1.7 MG/L

## 2024-07-22 LAB
ALBUMIN SERPL-MCNC: 4.26 G/DL (ref 3.75–5.01)
ALPHA1 GLOB SERPL ELPH-MCNC: 0.27 G/DL (ref 0.19–0.46)
ALPHA2 GLOB SERPL ELPH-MCNC: 0.63 G/DL (ref 0.48–1.05)
B-GLOBULIN SERPL ELPH-MCNC: 1.47 G/DL (ref 0.48–1.1)
DEPRECATED KAPPA LC FREE/LAMBDA SER: 15.72 {RATIO} (ref 0.26–1.65)
EER MONOCLONAL PROTEIN AND FLC, SERUM: ABNORMAL
GAMMA GLOB SERPL ELPH-MCNC: 1.77 G/DL (ref 0.62–1.51)
IGA SERPL-MCNC: 47 MG/DL (ref 68–408)
IGG SERPL-MCNC: 2524 MG/DL (ref 768–1632)
IGM SERPL-MCNC: 34 MG/DL (ref 35–263)
INTERPRETATION SERPL IFE-IMP: ABNORMAL
INTERPRETATION SERPL IFE-IMP: ABNORMAL
KAPPA LC FREE SER-MCNC: 118.51 MG/L (ref 3.3–19.4)
LAMBDA LC FREE SERPL-MCNC: 7.54 MG/L (ref 5.71–26.3)
MONOCLONAL PROTEIN, SERUM: 2.58 G/DL
PROT SERPL-MCNC: 8.4 G/DL (ref 6.3–8.2)

## 2024-07-31 DIAGNOSIS — F41.9 ANXIETY: ICD-10-CM

## 2024-07-31 DIAGNOSIS — E55.9 VITAMIN D DEFICIENCY: ICD-10-CM

## 2024-07-31 DIAGNOSIS — Z13.220 SCREENING FOR HYPERLIPIDEMIA: ICD-10-CM

## 2024-07-31 LAB
25(OH)D3 SERPL-MCNC: 27.8 NG/ML
CHOLEST SERPL-MCNC: 231 MG/DL (ref 160–199)
HDLC SERPL-MCNC: 82 MG/DL (ref 65–121)
LDLC SERPL CALC-MCNC: 134 MG/DL
TRIGL SERPL-MCNC: 74 MG/DL (ref 0–149)
TSH SERPL DL<=0.005 MIU/L-ACNC: 2.41 UIU/ML (ref 0.27–4.2)

## 2024-08-02 ENCOUNTER — TELEPHONE (OUTPATIENT)
Dept: PRIMARY CARE CLINIC | Age: 74
End: 2024-08-02

## 2024-08-02 NOTE — TELEPHONE ENCOUNTER
----- Message from FIORELLA Sandoval CNP sent at 8/2/2024  1:49 PM CDT -----  Vitamin D is slightly low and cholesterol is elevated more since her last labs in October. Will discuss at her upcoming follow up appt next week

## 2024-08-08 ENCOUNTER — OFFICE VISIT (OUTPATIENT)
Dept: PRIMARY CARE CLINIC | Age: 74
End: 2024-08-08
Payer: MEDICARE

## 2024-08-08 VITALS
WEIGHT: 123.8 LBS | OXYGEN SATURATION: 96 % | HEART RATE: 79 BPM | HEIGHT: 63 IN | SYSTOLIC BLOOD PRESSURE: 128 MMHG | DIASTOLIC BLOOD PRESSURE: 68 MMHG | TEMPERATURE: 96.9 F | BODY MASS INDEX: 21.93 KG/M2

## 2024-08-08 DIAGNOSIS — F41.9 ANXIETY: ICD-10-CM

## 2024-08-08 PROCEDURE — G8399 PT W/DXA RESULTS DOCUMENT: HCPCS | Performed by: NURSE PRACTITIONER

## 2024-08-08 PROCEDURE — 1036F TOBACCO NON-USER: CPT | Performed by: NURSE PRACTITIONER

## 2024-08-08 PROCEDURE — 1090F PRES/ABSN URINE INCON ASSESS: CPT | Performed by: NURSE PRACTITIONER

## 2024-08-08 PROCEDURE — 99214 OFFICE O/P EST MOD 30 MIN: CPT | Performed by: NURSE PRACTITIONER

## 2024-08-08 PROCEDURE — 1123F ACP DISCUSS/DSCN MKR DOCD: CPT | Performed by: NURSE PRACTITIONER

## 2024-08-08 PROCEDURE — G8427 DOCREV CUR MEDS BY ELIG CLIN: HCPCS | Performed by: NURSE PRACTITIONER

## 2024-08-08 PROCEDURE — G8420 CALC BMI NORM PARAMETERS: HCPCS | Performed by: NURSE PRACTITIONER

## 2024-08-08 PROCEDURE — 3017F COLORECTAL CA SCREEN DOC REV: CPT | Performed by: NURSE PRACTITIONER

## 2024-08-14 RX ORDER — ALPRAZOLAM 0.5 MG/1
0.5 TABLET ORAL NIGHTLY PRN
Qty: 30 TABLET | Refills: 0 | Status: SHIPPED | OUTPATIENT
Start: 2024-08-14 | End: 2024-09-13

## 2024-08-14 ASSESSMENT — ENCOUNTER SYMPTOMS
ABDOMINAL PAIN: 0
VOMITING: 0
COLOR CHANGE: 0
SHORTNESS OF BREATH: 0
SORE THROAT: 0
CHEST TIGHTNESS: 0
COUGH: 0
NAUSEA: 0
DIARRHEA: 0

## 2024-08-14 NOTE — PROGRESS NOTES
Ms.Bonny Francis is a 74 y.o. female who presents today for  Chief Complaint   Patient presents with    3 Month Follow-Up       HPI:  Patient here today for follow-up.  She denies any complaints or concerns.  Patient continues to take Xanax as needed for sleep and anxiety.    Patient due for mammogram- She will call to schedule.    Review of Systems   Constitutional:  Negative for activity change and fever.   HENT:  Negative for congestion, ear pain and sore throat.    Respiratory:  Negative for cough, chest tightness and shortness of breath.    Cardiovascular:  Negative for chest pain.   Gastrointestinal:  Negative for abdominal pain, diarrhea, nausea and vomiting.   Genitourinary:  Negative for frequency and urgency.   Musculoskeletal:  Negative for arthralgias and myalgias.   Skin:  Negative for color change.   Neurological:  Negative for dizziness, weakness and numbness.   Psychiatric/Behavioral:  Negative for agitation. The patient is not nervous/anxious.        Past Medical History:   Diagnosis Date    Anxiety     Elevated blood pressure reading     Elevated serum protein level     GERD (gastroesophageal reflux disease)     History of multiple myeloma     Hypercalcemia     Hyperlipidemia     Monoclonal gammopathy        Current Outpatient Medications   Medication Sig Dispense Refill    aspirin EC 81 MG EC tablet Take 1 tablet by mouth daily 90 tablet 1    vitamin B-12 (CYANOCOBALAMIN) 100 MCG tablet Take 0.5 tablets by mouth daily      docusate sodium (COLACE) 100 MG capsule Take 1 capsule by mouth      vitamin D (CHOLECALCIFEROL) 1000 UNIT TABS tablet Take 2 tablets by mouth daily      Ascorbic Acid (VITAMIN C) 500 MG CAPS Take 1 tablet by mouth daily.      melatonin 3 MG TABS tablet Take 1 tablet by mouth nightly as needed      L-LYSINE PO Take 1 tablet by mouth daily PRN      ALPRAZolam (XANAX) 0.5 MG tablet Take 1 tablet by mouth nightly as needed for Sleep or Anxiety for up to 30 days. Max Daily Amount:

## 2024-08-16 ENCOUNTER — HOSPITAL ENCOUNTER (OUTPATIENT)
Dept: WOMENS IMAGING | Age: 74
Discharge: HOME | End: 2024-08-16
Payer: MEDICARE

## 2024-08-16 VITALS — HEIGHT: 62 IN | BODY MASS INDEX: 22.63 KG/M2 | WEIGHT: 123 LBS

## 2024-08-16 DIAGNOSIS — Z12.31 ENCOUNTER FOR SCREENING MAMMOGRAM FOR MALIGNANT NEOPLASM OF BREAST: ICD-10-CM

## 2024-08-16 PROCEDURE — 77063 BREAST TOMOSYNTHESIS BI: CPT

## 2024-08-22 ENCOUNTER — TELEPHONE (OUTPATIENT)
Dept: PRIMARY CARE CLINIC | Age: 74
End: 2024-08-22

## 2024-08-22 NOTE — TELEPHONE ENCOUNTER
----- Message from FIORELLA Rosales CNP sent at 8/20/2024 10:01 AM CDT -----  Normal mammogram. Recommended repeat in 1 year

## 2024-10-17 DIAGNOSIS — F41.9 ANXIETY: ICD-10-CM

## 2024-10-17 NOTE — TELEPHONE ENCOUNTER
Renee Bolton called to request a refill on her medication.      Last office visit : 8/8/2024   Next office visit : 11/8/2024     Last UDS:   Benzodiazepine Screen, Urine   Date Value Ref Range Status   05/08/2024 neg  Final     Buprenorphine Urine   Date Value Ref Range Status   05/08/2024 neg  Final     Cocaine Metabolite Screen, Urine   Date Value Ref Range Status   05/08/2024 neg  Final     Gabapentin Screen, Urine   Date Value Ref Range Status   05/08/2024 neg  Final     Oxycodone Screen, Ur   Date Value Ref Range Status   05/08/2024 neg  Final     Propoxyphene Screen, Urine   Date Value Ref Range Status   05/08/2024 neg  Final     THC Screen, Urine   Date Value Ref Range Status   05/08/2024 neg  Final     Tricyclic Antidepressants, Urine   Date Value Ref Range Status   05/08/2024 neg  Final       Last Matthieu: 10/17/24  Medication Contract:    Last Fill: 8/19/24    Requested Prescriptions     Pending Prescriptions Disp Refills    ALPRAZolam (XANAX) 0.5 MG tablet [Pharmacy Med Name: ALPRAZOLAM 0.5 MG TABS 0.5 Tablet] 30 tablet 0     Sig: TAKE ONE TABLET BY MOUTH NIGHTLY AS NEEDED FOR SLEEP OR ANXIETY FOR UP TO 30 DAYS                   Please approve or refuse this medication.   Angelica Bonds LPN

## 2024-10-18 RX ORDER — ALPRAZOLAM 0.5 MG
TABLET ORAL
Qty: 30 TABLET | Refills: 0 | Status: SHIPPED | OUTPATIENT
Start: 2024-10-18 | End: 2024-11-17

## 2024-11-19 ENCOUNTER — TELEPHONE (OUTPATIENT)
Dept: HEMATOLOGY | Age: 74
End: 2024-11-19

## 2024-11-19 NOTE — TELEPHONE ENCOUNTER

## 2024-11-20 DIAGNOSIS — D47.2 SMOLDERING MULTIPLE MYELOMA: Primary | ICD-10-CM

## 2024-11-20 ASSESSMENT — ENCOUNTER SYMPTOMS
BACK PAIN: 0
ABDOMINAL PAIN: 0
EYES NEGATIVE: 1
EYE DISCHARGE: 0
RESPIRATORY NEGATIVE: 1
SHORTNESS OF BREATH: 0
VOMITING: 0
EYE PAIN: 0
DIARRHEA: 0
WHEEZING: 0
COUGH: 0
SORE THROAT: 0
EYE REDNESS: 0
GASTROINTESTINAL NEGATIVE: 1
CONSTIPATION: 0
BLOOD IN STOOL: 0
NAUSEA: 0

## 2024-11-20 NOTE — PROGRESS NOTES
Progress Note      Pt Name: Renee Francis  YOB: 1950  MRN: [id number]    Date of evaluation: 11/21/2024  History Obtained From:  patient, electronic medical record    CHIEF COMPLAINT:    Chief Complaint   Patient presents with    Follow-up     Smoldering multiple myeloma     HISTORY OF PRESENT ILLNESS:    Renee Francis is a 74 y.o.  female who is currently being followed for smoldering multiple myeloma, IgG kappa restricted, initially diagnosed November 2016, with no known myeloma defining events and yet to require intervention.  Current recommendation is for routine surveillance and lab monitoring.  Myeloma studies on 3/15/2024 were overall stable with no significant new concerning.  Renee returns today in follow-up, laboratory and further treatment recommendations.    Today's clinic visit to include physical assessment, review of systems, any lab or radiographic findings that were available and plan of care are documented below.    ONCOLOGIC HISTORY:   Diagnosis  Smoldering multiple myeloma, November 2016  IgG kappa restricted     Treatment summary  Close laboratory/clinical surveillance     Renee was seen on initial hematologic consultation by  on 8/4/2016 as a referral by Dr. Stephani Montgomery for evaluation of a monoclonal protein finding. She was seen by her primary care for a annual visit in July 2016 and was found to have a elevated protein at 9.3 and mildly elevated calcium levels at 10.7.     This prompted a workup with serum protein electrophoresis which showed a M spike of 2, also confirmed by us on 8/4/2016. K/L light chains ratio 4.08. IgG elevated at 2258 mg/dl. IgA low at 67 mg/dl (immuneparesis). Beta-2 microglobulin was 1.5. She complained of fatigue but has never had any fever no chills no night history of repeated infections, no weight loss. She had occasional back pain but denied any other bone pain. She also complained of occasional abdominal pain due to her

## 2024-11-21 ENCOUNTER — OFFICE VISIT (OUTPATIENT)
Dept: HEMATOLOGY | Age: 74
End: 2024-11-21
Payer: MEDICARE

## 2024-11-21 ENCOUNTER — APPOINTMENT (OUTPATIENT)
Dept: GENERAL RADIOLOGY | Age: 74
End: 2024-11-21
Payer: MEDICARE

## 2024-11-21 ENCOUNTER — HOSPITAL ENCOUNTER (OUTPATIENT)
Dept: INFUSION THERAPY | Age: 74
Discharge: HOME | End: 2024-11-21
Payer: MEDICARE

## 2024-11-21 ENCOUNTER — OFFICE VISIT (OUTPATIENT)
Dept: PRIMARY CARE CLINIC | Age: 74
End: 2024-11-21

## 2024-11-21 ENCOUNTER — HOSPITAL ENCOUNTER (EMERGENCY)
Age: 74
Discharge: HOME | End: 2024-11-21
Attending: EMERGENCY MEDICINE
Payer: MEDICARE

## 2024-11-21 VITALS
OXYGEN SATURATION: 99 % | DIASTOLIC BLOOD PRESSURE: 69 MMHG | TEMPERATURE: 97.8 F | SYSTOLIC BLOOD PRESSURE: 143 MMHG | HEART RATE: 76 BPM | RESPIRATION RATE: 15 BRPM

## 2024-11-21 VITALS
HEART RATE: 97 BPM | TEMPERATURE: 96.9 F | OXYGEN SATURATION: 95 % | HEIGHT: 63 IN | WEIGHT: 122 LBS | BODY MASS INDEX: 21.62 KG/M2 | DIASTOLIC BLOOD PRESSURE: 84 MMHG | SYSTOLIC BLOOD PRESSURE: 138 MMHG

## 2024-11-21 VITALS
TEMPERATURE: 97.7 F | SYSTOLIC BLOOD PRESSURE: 172 MMHG | HEIGHT: 63 IN | DIASTOLIC BLOOD PRESSURE: 90 MMHG | HEART RATE: 76 BPM | BODY MASS INDEX: 21.6 KG/M2 | OXYGEN SATURATION: 99 % | WEIGHT: 121.9 LBS

## 2024-11-21 DIAGNOSIS — D47.2 SMOLDERING MULTIPLE MYELOMA: ICD-10-CM

## 2024-11-21 DIAGNOSIS — E55.9 VITAMIN D DEFICIENCY: ICD-10-CM

## 2024-11-21 DIAGNOSIS — R00.2 PALPITATIONS: ICD-10-CM

## 2024-11-21 DIAGNOSIS — Z13.220 SCREENING FOR HYPERLIPIDEMIA: ICD-10-CM

## 2024-11-21 DIAGNOSIS — Z71.89 CARE PLAN DISCUSSED WITH PATIENT: ICD-10-CM

## 2024-11-21 DIAGNOSIS — I48.20 CHRONIC ATRIAL FIBRILLATION (HCC): Primary | ICD-10-CM

## 2024-11-21 DIAGNOSIS — R53.83 OTHER FATIGUE: ICD-10-CM

## 2024-11-21 DIAGNOSIS — R03.0 ELEVATED BLOOD PRESSURE READING: ICD-10-CM

## 2024-11-21 DIAGNOSIS — Z00.00 WELL ADULT EXAM: Primary | ICD-10-CM

## 2024-11-21 DIAGNOSIS — R07.89 ATYPICAL CHEST PAIN: Primary | ICD-10-CM

## 2024-11-21 DIAGNOSIS — I48.92 ATRIAL FLUTTER, PAROXYSMAL (HCC): ICD-10-CM

## 2024-11-21 DIAGNOSIS — E78.2 MIXED HYPERLIPIDEMIA: ICD-10-CM

## 2024-11-21 DIAGNOSIS — F41.9 ANXIETY: ICD-10-CM

## 2024-11-21 LAB
ALBUMIN SERPL-MCNC: 3.8 G/DL (ref 3.5–5.2)
ALBUMIN SERPL-MCNC: 4.5 G/DL (ref 3.5–5.2)
ALP SERPL-CCNC: 77 U/L (ref 35–104)
ALP SERPL-CCNC: 93 U/L (ref 35–104)
ALT SERPL-CCNC: 29 U/L (ref 5–33)
ALT SERPL-CCNC: 30 U/L (ref 5–33)
ANION GAP SERPL CALCULATED.3IONS-SCNC: 13 MMOL/L (ref 7–19)
ANION GAP SERPL CALCULATED.3IONS-SCNC: 9 MMOL/L (ref 7–19)
AST SERPL-CCNC: 25 U/L (ref 5–32)
AST SERPL-CCNC: 28 U/L (ref 5–32)
BASOPHILS # BLD: 0.06 K/UL (ref 0.01–0.08)
BASOPHILS # BLD: 0.1 K/UL (ref 0–0.2)
BASOPHILS NFR BLD: 0.8 % (ref 0–1)
BASOPHILS NFR BLD: 0.9 % (ref 0.1–1.2)
BILIRUB SERPL-MCNC: 0.4 MG/DL (ref 0.2–1.2)
BILIRUB SERPL-MCNC: 0.4 MG/DL (ref 0–1.2)
BUN SERPL-MCNC: 10 MG/DL (ref 8–23)
BUN SERPL-MCNC: 10 MG/DL (ref 8–23)
CALCIUM SERPL-MCNC: 10 MG/DL (ref 8.8–10.2)
CALCIUM SERPL-MCNC: 9.4 MG/DL (ref 8.8–10.2)
CHLORIDE SERPL-SCNC: 100 MMOL/L (ref 98–111)
CHLORIDE SERPL-SCNC: 101 MMOL/L (ref 98–107)
CO2 SERPL-SCNC: 25 MMOL/L (ref 22–29)
CO2 SERPL-SCNC: 27 MMOL/L (ref 22–29)
CREAT SERPL-MCNC: 0.6 MG/DL (ref 0.5–0.9)
CREAT SERPL-MCNC: 0.7 MG/DL (ref 0.5–0.9)
EOSINOPHIL # BLD: 0.24 K/UL (ref 0.04–0.54)
EOSINOPHIL # BLD: 0.3 K/UL (ref 0–0.6)
EOSINOPHIL NFR BLD: 3.5 % (ref 0.7–7)
EOSINOPHIL NFR BLD: 3.5 % (ref 0–5)
ERYTHROCYTE [DISTWIDTH] IN BLOOD BY AUTOMATED COUNT: 12.2 % (ref 11.5–14.5)
ERYTHROCYTE [DISTWIDTH] IN BLOOD BY AUTOMATED COUNT: 12.4 % (ref 11.7–14.4)
GLUCOSE SERPL-MCNC: 120 MG/DL (ref 70–99)
GLUCOSE SERPL-MCNC: 171 MG/DL (ref 70–99)
HCT VFR BLD AUTO: 36.4 % (ref 34.1–44.9)
HCT VFR BLD AUTO: 41.2 % (ref 37–47)
HGB BLD-MCNC: 12.7 G/DL (ref 11.2–15.7)
HGB BLD-MCNC: 14.1 G/DL (ref 12–16)
IMM GRANULOCYTES # BLD: 0 K/UL
LYMPHOCYTES # BLD: 2.37 K/UL (ref 1.18–3.74)
LYMPHOCYTES # BLD: 2.8 K/UL (ref 1.1–4.5)
LYMPHOCYTES NFR BLD: 34.3 % (ref 19.3–53.1)
LYMPHOCYTES NFR BLD: 37.4 % (ref 20–40)
MAGNESIUM SERPL-MCNC: 2.3 MG/DL (ref 1.6–2.4)
MCH RBC QN AUTO: 31.8 PG (ref 27–31)
MCH RBC QN AUTO: 32 PG (ref 25.6–32.2)
MCHC RBC AUTO-ENTMCNC: 34.2 G/DL (ref 33–37)
MCHC RBC AUTO-ENTMCNC: 34.9 G/DL (ref 32.3–35.5)
MCV RBC AUTO: 91.7 FL (ref 79.4–94.8)
MCV RBC AUTO: 92.8 FL (ref 81–99)
MONOCYTES # BLD: 0.29 K/UL (ref 0.24–0.82)
MONOCYTES # BLD: 0.4 K/UL (ref 0–0.9)
MONOCYTES NFR BLD: 4.2 % (ref 4.7–12.5)
MONOCYTES NFR BLD: 5.8 % (ref 0–10)
NEUTROPHILS # BLD: 3.9 K/UL (ref 1.5–7.5)
NEUTROPHILS # BLD: 3.94 K/UL (ref 1.56–6.13)
NEUTS SEG NFR BLD: 52.4 % (ref 50–65)
NEUTS SEG NFR BLD: 57 % (ref 34–71.1)
PLATELET # BLD AUTO: 314 K/UL (ref 182–369)
PLATELET # BLD AUTO: 339 K/UL (ref 130–400)
PMV BLD AUTO: 10.1 FL (ref 7.4–10.4)
PMV BLD AUTO: 10.4 FL (ref 9.4–12.3)
POTASSIUM SERPL-SCNC: 3.5 MMOL/L (ref 3.5–5)
POTASSIUM SERPL-SCNC: 3.9 MMOL/L (ref 3.5–5.1)
PROT SERPL-MCNC: 8.3 G/DL (ref 6.4–8.3)
PROT SERPL-MCNC: 9.4 G/DL (ref 6.4–8.3)
RBC # BLD AUTO: 3.97 M/UL (ref 3.93–5.22)
RBC # BLD AUTO: 4.44 M/UL (ref 4.2–5.4)
SODIUM SERPL-SCNC: 137 MMOL/L (ref 136–145)
SODIUM SERPL-SCNC: 138 MMOL/L (ref 136–145)
TROPONIN, HIGH SENSITIVITY: 10 NG/L (ref 0–14)
TROPONIN, HIGH SENSITIVITY: 7 NG/L (ref 0–14)
WBC # BLD AUTO: 6.91 K/UL (ref 3.98–10.04)
WBC # BLD AUTO: 7.4 K/UL (ref 4.8–10.8)

## 2024-11-21 PROCEDURE — 36415 COLL VENOUS BLD VENIPUNCTURE: CPT

## 2024-11-21 PROCEDURE — 99285 EMERGENCY DEPT VISIT HI MDM: CPT

## 2024-11-21 PROCEDURE — 71045 X-RAY EXAM CHEST 1 VIEW: CPT

## 2024-11-21 PROCEDURE — G8484 FLU IMMUNIZE NO ADMIN: HCPCS | Performed by: NURSE PRACTITIONER

## 2024-11-21 PROCEDURE — 6370000000 HC RX 637 (ALT 250 FOR IP): Performed by: EMERGENCY MEDICINE

## 2024-11-21 PROCEDURE — G8399 PT W/DXA RESULTS DOCUMENT: HCPCS | Performed by: NURSE PRACTITIONER

## 2024-11-21 PROCEDURE — 3017F COLORECTAL CA SCREEN DOC REV: CPT | Performed by: NURSE PRACTITIONER

## 2024-11-21 PROCEDURE — 80053 COMPREHEN METABOLIC PANEL: CPT

## 2024-11-21 PROCEDURE — 84484 ASSAY OF TROPONIN QUANT: CPT

## 2024-11-21 PROCEDURE — 1126F AMNT PAIN NOTED NONE PRSNT: CPT | Performed by: NURSE PRACTITIONER

## 2024-11-21 PROCEDURE — 83735 ASSAY OF MAGNESIUM: CPT

## 2024-11-21 PROCEDURE — 93005 ELECTROCARDIOGRAM TRACING: CPT | Performed by: EMERGENCY MEDICINE

## 2024-11-21 PROCEDURE — G8427 DOCREV CUR MEDS BY ELIG CLIN: HCPCS | Performed by: NURSE PRACTITIONER

## 2024-11-21 PROCEDURE — 1090F PRES/ABSN URINE INCON ASSESS: CPT | Performed by: NURSE PRACTITIONER

## 2024-11-21 PROCEDURE — 99212 OFFICE O/P EST SF 10 MIN: CPT

## 2024-11-21 PROCEDURE — G8420 CALC BMI NORM PARAMETERS: HCPCS | Performed by: NURSE PRACTITIONER

## 2024-11-21 PROCEDURE — 1036F TOBACCO NON-USER: CPT | Performed by: NURSE PRACTITIONER

## 2024-11-21 PROCEDURE — 85025 COMPLETE CBC W/AUTO DIFF WBC: CPT

## 2024-11-21 PROCEDURE — 1123F ACP DISCUSS/DSCN MKR DOCD: CPT | Performed by: NURSE PRACTITIONER

## 2024-11-21 PROCEDURE — 99213 OFFICE O/P EST LOW 20 MIN: CPT | Performed by: NURSE PRACTITIONER

## 2024-11-21 RX ORDER — METOPROLOL TARTRATE 1 MG/ML
5 INJECTION, SOLUTION INTRAVENOUS ONCE
Status: DISCONTINUED | OUTPATIENT
Start: 2024-11-21 | End: 2024-11-21

## 2024-11-21 RX ORDER — ASPIRIN 81 MG/1
243 TABLET, CHEWABLE ORAL ONCE
Status: COMPLETED | OUTPATIENT
Start: 2024-11-21 | End: 2024-11-21

## 2024-11-21 RX ORDER — EAR PLUGS
EACH OTIC (EAR)
COMMUNITY

## 2024-11-21 RX ADMIN — ASPIRIN 243 MG: 81 TABLET, CHEWABLE ORAL at 15:46

## 2024-11-21 NOTE — ED NOTES
Pt wants to \"wait a little while\" to see if her blood pressure will go down own its own and does not want to take her lopressor.      Dr. Marx made aware.

## 2024-11-21 NOTE — ED PROVIDER NOTES
[facility] EMERGENCY DEPT  eMERGENCY dEPARTMENT eNCOUnter      Pt Name: Renee Francis  MRN: [id number]  Birthdate 1950  Date of evaluation: 11/21/2024  Provider: Sofia Marx DO    CHIEF COMPLAINT       Chief Complaint   Patient presents with    Palpitations     Chest \"discomfort\" and palpitations onset today, hx of afib    Chest Pain         HISTORY OF PRESENT ILLNESS   (Location/Symptom, Timing/Onset,Context/Setting, Quality, Duration, Modifying Factors, Severity)  Note limiting factors.   Renee Francis is a 74 y.o. female who presents to the emergency department     Patient is a 74-year-old female who presents emergency department complaining of hypertension.  Patient says she has a history of atrial fibrillation.  She also reports history of whitecoat syndrome.  She had an appointment with her PCP today around 11 AM and says she was anxious and took the Xanax.  She said her blood pressure was little bit high.  She later had a follow-up appointment with Gisele Grant and had increased anxiety and says her blood pressure was high on arrival and increasing on recheck.  They recommended she come to the emergency department.  Reports palpitations. Patient reports occasionally she gets twinges of chest discomfort when she has atrial fibrillation.  She has seen Dr. Rodriguez cardiology for A-fib and says she refuses anticoagulation.  She says she has multiple drug allergies and is fearful of taking new medications.  She says she will only take baby aspirin.  She has no prior history of hypertension.  Patient denies any shortness of breath, dizziness, passing out, nausea, vomiting.  She drank 2 or 3 cups of coffee today.  She denies family history of premature CAD.  She denies personal history of CAD.  No drug use.  No tobacco use no nicotine use.  There are no other complaints or concerns at this time.            NursingNotes were reviewed.    REVIEW OF SYSTEMS    (2-9 systems for level 4, 10 or more for level 5)     As per HPI    sentences.  Equal chest rise with inspiration. No accessory muscle usage.   Abdomen: Soft, nontender, non distended.  No rebound, guarding, rigidity.  Back: No CVA tenderness. No midline bony tenderness.   Extremities: Normal ROM, no edema. No calf tenderness. No appreciable joint swelling.  Skin:  Warm, dry. No visible rashes. No cyanosis, erythema or pallor.   Neurologic:  Awake, alert, oriented to person, place, time, situation. Speech is clear.  Psychiatric: Normal interaction and behavior.  Mildly anxious appearing.  DIAGNOSTIC RESULTS     EKG: All EKG's are interpreted by the Emergency Department Physician who either signs or Co-signs this chart in the absence of a cardiologist.    EKG: interpreted by me 3:14 PM rate of 88 bpm looks like atrial flutter with controlled ventricular rate, normal axis, QTc 349, no STEMI. No prior EKG available for comparison.     Cardiac monitor: observed by me for 10 seconds during patient encounter, atrial flutter rate in the 80s. Reason for cardiac monitoring: Palpitations to assess for arrhythmia.     EKG#2: interpreted by me 4:27 PM normal sinus rhythm rate of 72 bpm, normal axis, QTc 436, no STEMI. No prior EKG available for comparison.     RADIOLOGY:        XR CHEST PORTABLE    (Results Pending)           LABS:  Labs Reviewed   CBC WITH AUTO DIFFERENTIAL   COMPREHENSIVE METABOLIC PANEL   MAGNESIUM   TROPONIN       All other labs were within normal range or not returned as of this dictation.    Prior records reviewed: Cardiology visit Dr. Pearson h/o PAF - not anticoagulated; white coat hypertension    EMERGENCY DEPARTMENT COURSE and DIFFERENTIAL DIAGNOSIS/MDM:   Vitals:    Vitals:    11/21/24 1548 11/21/24 1600 11/21/24 1615 11/21/24 1633   BP: (!) 192/93 (!) 176/94 (!) 162/79 (!) 144/68   Pulse:  76 73    Resp:  18     Temp:       TempSrc:       SpO2:  98%         MDM  Patient is a 74-year-old female who presents emergency department complaining of hypertension.  Patient  says she has a history of atrial fibrillation.  She also reports history of whitecoat syndrome.  She had an appointment with her PCP today around 11 AM and says she was anxious and took the Xanax.  She said her blood pressure was little bit high.  She later had a follow-up appointment with Gisele Grant and had increased anxiety and says her blood pressure was high on arrival and increasing on recheck.  They recommended she come to the emergency department.  Reports palpitations. Patient reports occasionally she gets twinges of chest discomfort when she has atrial fibrillation.  She has seen Dr. Rodriguez cardiology for A-fib and says she refuses anticoagulation.  She says she has multiple drug allergies and is fearful of taking new medications.  She says she will only take baby aspirin.  She has no prior history of hypertension.  Patient denies any shortness of breath, dizziness, passing out, nausea, vomiting.  She drank 2 or 3 cups of coffee today.  She denies family history of premature CAD.  She denies personal history of CAD.  No drug use.  No tobacco use no nicotine use.  There are no other complaints or concerns at this time.      Prior records reviewed by me as noted above. I independently reviewed the results of the ED workup.  Metoprolol was ordered however the patient refused it.  Her blood pressure did spontaneously improve.  EKG did not reveal STEMI x 2.  Troponin within normal limits x 2.  White blood cell count is normal.  She is not anemic.  CMP did not reveal CRYSTAL or significant electrolyte abnormality warranting emergent intervention.  Magnesium with normal limits.  Chest x-ray independently reviewed by me.  No significant acute abnormality identified.  On recheck she was resting comfortably.  Discussed the limitations of the emergency department workup.  I recommended she follow-up with her cardiologist.    Results discussed with the patient at bedside. Return and follow up instructions given verbally and

## 2024-11-21 NOTE — ED NOTES
Pt ambulatory to restroom with steady gate, independently.    Denies pain at this time.    Consent Type: Consent 1 (Standard)

## 2024-11-21 NOTE — PROGRESS NOTES
effort is normal.      Breath sounds: Normal breath sounds.   Abdominal:      General: Bowel sounds are normal.      Palpations: Abdomen is soft.   Musculoskeletal:         General: Normal range of motion.      Cervical back: Normal range of motion.   Skin:     General: Skin is warm and dry.   Neurological:      Mental Status: She is alert and oriented to person, place, and time.   Psychiatric:         Mood and Affect: Mood normal.         Behavior: Behavior normal.         ASSESSMENT/PLAN:  1. Well adult exam      2. Vitamin D deficiency    - Vitamin D 25 Hydroxy; Future    3. Mixed hyperlipidemia    - Lipid Panel; Future    4. Other fatigue    - TSH with Reflex to FT4; Future  - Vitamin D 25 Hydroxy; Future    5. Screening for hyperlipidemia    - TSH with Reflex to FT4; Future    6. Anxiety  Continue prescribed Xanax as needed for anxiety symptoms and advised patient to continue to monitor blood pressures, and call if it remains consistently elevated.          Return in about 4 weeks (around 12/19/2024) for AWV.    Patient offered educational handouts and has had all questions answered.  Patient voices understanding and agrees to plans along with risks and benefits of plan. Patient is instructed to continue prior meds, diet, and exercise plans as instructed. Patient agrees to follow up as instructed and sooner if needed.  Patient agrees to go to ER if condition becomes emergent.      EMR Dragon/transcription disclaimer: Some of this encounter note is an electronic transcription/translation of spoken language to printed text. The electronic translation of spoken language may permit erroneous, or at times, nonsensical words or phrases to be inadvertently transcribed. Although I have reviewed the note for such errors, some may still exist.    Electronically signed by FIORELLA Sandoval CNP on 12/2/2024 at 6:47 PM

## 2024-11-23 LAB
B2 MICROGLOB SERPL-MCNC: 1.6 MG/L
EKG P AXIS: 44 DEGREES
EKG P AXIS: NORMAL DEGREES
EKG P-R INTERVAL: 140 MS
EKG P-R INTERVAL: NORMAL MS
EKG Q-T INTERVAL: 294 MS
EKG Q-T INTERVAL: 410 MS
EKG QRS DURATION: 72 MS
EKG QRS DURATION: 94 MS
EKG QTC CALCULATION (BAZETT): 343 MS
EKG QTC CALCULATION (BAZETT): 431 MS
EKG T AXIS: -33 DEGREES
EKG T AXIS: 24 DEGREES

## 2024-11-23 PROCEDURE — 93010 ELECTROCARDIOGRAM REPORT: CPT | Performed by: INTERNAL MEDICINE

## 2024-11-25 ENCOUNTER — APPOINTMENT (OUTPATIENT)
Dept: GENERAL RADIOLOGY | Age: 74
End: 2024-11-25
Payer: MEDICARE

## 2024-11-25 ENCOUNTER — TELEPHONE (OUTPATIENT)
Dept: PRIMARY CARE CLINIC | Age: 74
End: 2024-11-25

## 2024-11-25 ENCOUNTER — HOSPITAL ENCOUNTER (EMERGENCY)
Age: 74
Discharge: HOME | End: 2024-11-25
Attending: EMERGENCY MEDICINE
Payer: MEDICARE

## 2024-11-25 VITALS
DIASTOLIC BLOOD PRESSURE: 76 MMHG | HEART RATE: 78 BPM | RESPIRATION RATE: 16 BRPM | SYSTOLIC BLOOD PRESSURE: 162 MMHG | TEMPERATURE: 98.6 F | OXYGEN SATURATION: 97 %

## 2024-11-25 DIAGNOSIS — I48.0 PAROXYSMAL ATRIAL FIBRILLATION (HCC): Primary | ICD-10-CM

## 2024-11-25 LAB
ALBUMIN SERPL-MCNC: 3.8 G/DL (ref 3.5–5.2)
ALBUMIN SERPL-MCNC: 4.07 G/DL (ref 3.75–5.01)
ALP SERPL-CCNC: 79 U/L (ref 35–104)
ALPHA1 GLOB SERPL ELPH-MCNC: 0.23 G/DL (ref 0.19–0.46)
ALPHA2 GLOB SERPL ELPH-MCNC: 0.62 G/DL (ref 0.48–1.05)
ALT SERPL-CCNC: 21 U/L (ref 5–33)
ANION GAP SERPL CALCULATED.3IONS-SCNC: 11 MMOL/L (ref 7–19)
AST SERPL-CCNC: 20 U/L (ref 5–32)
B-GLOBULIN SERPL ELPH-MCNC: 2.29 G/DL (ref 0.48–1.1)
BASOPHILS # BLD: 0.1 K/UL (ref 0–0.2)
BASOPHILS NFR BLD: 1 % (ref 0–1)
BILIRUB SERPL-MCNC: 0.2 MG/DL (ref 0.2–1.2)
BUN SERPL-MCNC: 13 MG/DL (ref 8–23)
CALCIUM SERPL-MCNC: 9.8 MG/DL (ref 8.8–10.2)
CHLORIDE SERPL-SCNC: 102 MMOL/L (ref 98–111)
CO2 SERPL-SCNC: 25 MMOL/L (ref 22–29)
CREAT SERPL-MCNC: 0.7 MG/DL (ref 0.5–0.9)
DEPRECATED KAPPA LC FREE/LAMBDA SER: 21.65 {RATIO} (ref 0.26–1.65)
EER MONOCLONAL PROTEIN AND FLC, SERUM: ABNORMAL
EKG P AXIS: 66 DEGREES
EKG P-R INTERVAL: 130 MS
EKG Q-T INTERVAL: 388 MS
EKG QRS DURATION: 88 MS
EKG QTC CALCULATION (BAZETT): 421 MS
EKG T AXIS: -79 DEGREES
EOSINOPHIL # BLD: 0.3 K/UL (ref 0–0.6)
EOSINOPHIL NFR BLD: 4 % (ref 0–5)
ERYTHROCYTE [DISTWIDTH] IN BLOOD BY AUTOMATED COUNT: 12.3 % (ref 11.5–14.5)
GAMMA GLOB SERPL ELPH-MCNC: 0.87 G/DL (ref 0.62–1.51)
GLUCOSE SERPL-MCNC: 102 MG/DL (ref 70–99)
HCT VFR BLD AUTO: 38 % (ref 37–47)
HGB BLD-MCNC: 13.1 G/DL (ref 12–16)
IGA SERPL-MCNC: 45 MG/DL (ref 68–408)
IGG SERPL-MCNC: 2539 MG/DL (ref 768–1632)
IGM SERPL-MCNC: 31 MG/DL (ref 35–263)
IMM GRANULOCYTES # BLD: 0 K/UL
INTERPRETATION SERPL IFE-IMP: ABNORMAL
INTERPRETATION SERPL IFE-IMP: ABNORMAL
KAPPA LC FREE SER-MCNC: 117.97 MG/L (ref 3.3–19.4)
LAMBDA LC FREE SERPL-MCNC: 5.45 MG/L (ref 5.71–26.3)
LYMPHOCYTES # BLD: 3.3 K/UL (ref 1.1–4.5)
LYMPHOCYTES NFR BLD: 45 % (ref 20–40)
MCH RBC QN AUTO: 32 PG (ref 27–31)
MCHC RBC AUTO-ENTMCNC: 34.5 G/DL (ref 33–37)
MCV RBC AUTO: 92.7 FL (ref 81–99)
MONOCLONAL PROTEIN, SERUM: 2.54 G/DL
MONOCYTES # BLD: 0.6 K/UL (ref 0–0.9)
MONOCYTES NFR BLD: 7.8 % (ref 0–10)
NEUTROPHILS # BLD: 3.1 K/UL (ref 1.5–7.5)
NEUTS SEG NFR BLD: 42.1 % (ref 50–65)
PLATELET # BLD AUTO: 312 K/UL (ref 130–400)
PMV BLD AUTO: 10.3 FL (ref 9.4–12.3)
POTASSIUM SERPL-SCNC: 3.6 MMOL/L (ref 3.5–5)
PROT SERPL-MCNC: 8.1 G/DL (ref 6.3–8.2)
PROT SERPL-MCNC: 8.3 G/DL (ref 6.4–8.3)
RBC # BLD AUTO: 4.1 M/UL (ref 4.2–5.4)
SODIUM SERPL-SCNC: 138 MMOL/L (ref 136–145)
TROPONIN, HIGH SENSITIVITY: 8 NG/L (ref 0–14)
WBC # BLD AUTO: 7.3 K/UL (ref 4.8–10.8)

## 2024-11-25 PROCEDURE — 71045 X-RAY EXAM CHEST 1 VIEW: CPT

## 2024-11-25 PROCEDURE — 36415 COLL VENOUS BLD VENIPUNCTURE: CPT

## 2024-11-25 PROCEDURE — 80053 COMPREHEN METABOLIC PANEL: CPT

## 2024-11-25 PROCEDURE — 93005 ELECTROCARDIOGRAM TRACING: CPT | Performed by: EMERGENCY MEDICINE

## 2024-11-25 PROCEDURE — 99285 EMERGENCY DEPT VISIT HI MDM: CPT

## 2024-11-25 PROCEDURE — 85025 COMPLETE CBC W/AUTO DIFF WBC: CPT

## 2024-11-25 PROCEDURE — 93010 ELECTROCARDIOGRAM REPORT: CPT | Performed by: INTERNAL MEDICINE

## 2024-11-25 PROCEDURE — 84484 ASSAY OF TROPONIN QUANT: CPT

## 2024-11-25 ASSESSMENT — PAIN - FUNCTIONAL ASSESSMENT
PAIN_FUNCTIONAL_ASSESSMENT: NONE - DENIES PAIN
PAIN_FUNCTIONAL_ASSESSMENT: NONE - DENIES PAIN

## 2024-11-25 NOTE — ED PROVIDER NOTES
[facility] EMERGENCY DEPT  eMERGENCY dEPARTMENT eNCOUnter      Pt Name: Renee Francis  MRN: [id number]  Birthdate 1950  Date of evaluation: 11/25/2024  Provider: Arnoldo Frazier MD    CHIEF COMPLAINT       Chief Complaint   Patient presents with    Palpitations     Pt woke up with palpitations. Pt has hx of afib.          HISTORY OF PRESENT ILLNESS   (Location/Symptom, Timing/Onset,Context/Setting, Quality, Duration, Modifying Factors, Severity)  Note limiting factors.   Renee Francis is a 74 y.o. female who presents to the emergency department ***     HPI    NursingNotes were reviewed.    REVIEW OF SYSTEMS    (2-9 systems for level 4, 10 or more for level 5)     Review of Systems         PAST MEDICALHISTORY     Past Medical History:   Diagnosis Date    Anxiety     Atrial fibrillation (HCC)     Elevated blood pressure reading     Elevated serum protein level     GERD (gastroesophageal reflux disease)     History of multiple myeloma     Hypercalcemia     Hyperlipidemia     Monoclonal gammopathy          SURGICAL HISTORY       Past Surgical History:   Procedure Laterality Date    HYSTERECTOMY (CERVIX STATUS UNKNOWN)      TIBIA FRACTURE SURGERY Left     with pradeep in lower left leg         CURRENT MEDICATIONS     Previous Medications    ASCORBIC ACID (VITAMIN C) 500 MG CAPS    Take 1 tablet by mouth daily.    ASPIRIN EC 81 MG EC TABLET    Take 1 tablet by mouth daily    CYANOCOBALAMIN (VITAMIN B-12) 1000 MCG/15ML LIQD    Take by mouth    DOCUSATE SODIUM (COLACE) 100 MG CAPSULE    Take 1 capsule by mouth    L-LYSINE PO    Take 1 tablet by mouth daily PRN    MELATONIN 3 MG TABS TABLET    Take 1 tablet by mouth nightly as needed    VITAMIN B-12 (CYANOCOBALAMIN) 100 MCG TABLET    Take 0.5 tablets by mouth daily    VITAMIN D (CHOLECALCIFEROL) 1000 UNIT TABS TABLET    Take 2 tablets by mouth daily       ALLERGIES     Prednisone, Bentyl [dicyclomine hcl], Darvocet a500 [propoxyphene n-acetaminophen], Mucinex [guaifenesin er], Predicort

## 2024-11-25 NOTE — TELEPHONE ENCOUNTER
Spoke with DR Nitin Riggs she stated to have pt to follow up in office with Alexa HODGSON and she could take her Xanax she does not need to adjust or take any medication not prescribe to her. She is in sinus rhythm not Afib by EKG

## 2024-11-25 NOTE — TELEPHONE ENCOUNTER
Pt stated her BP has been elevated to 194/100 pt . She has made two trips to ER in last 3 days she was wanting to know if she could take some of her husbands BP medication and if she should take her xanax

## 2024-11-26 ENCOUNTER — OFFICE VISIT (OUTPATIENT)
Dept: PRIMARY CARE CLINIC | Age: 74
End: 2024-11-26
Payer: MEDICARE

## 2024-11-26 VITALS
WEIGHT: 121 LBS | DIASTOLIC BLOOD PRESSURE: 80 MMHG | SYSTOLIC BLOOD PRESSURE: 134 MMHG | TEMPERATURE: 96.9 F | BODY MASS INDEX: 21.44 KG/M2 | HEART RATE: 83 BPM | OXYGEN SATURATION: 97 % | HEIGHT: 63 IN

## 2024-11-26 DIAGNOSIS — I48.0 PAROXYSMAL A-FIB (HCC): ICD-10-CM

## 2024-11-26 DIAGNOSIS — F41.9 ANXIETY: ICD-10-CM

## 2024-11-26 DIAGNOSIS — R03.0 ELEVATED BLOOD PRESSURE READING: Primary | ICD-10-CM

## 2024-11-26 PROCEDURE — G8399 PT W/DXA RESULTS DOCUMENT: HCPCS | Performed by: NURSE PRACTITIONER

## 2024-11-26 PROCEDURE — G8427 DOCREV CUR MEDS BY ELIG CLIN: HCPCS | Performed by: NURSE PRACTITIONER

## 2024-11-26 PROCEDURE — 3017F COLORECTAL CA SCREEN DOC REV: CPT | Performed by: NURSE PRACTITIONER

## 2024-11-26 PROCEDURE — 1036F TOBACCO NON-USER: CPT | Performed by: NURSE PRACTITIONER

## 2024-11-26 PROCEDURE — 1159F MED LIST DOCD IN RCRD: CPT | Performed by: NURSE PRACTITIONER

## 2024-11-26 PROCEDURE — 1123F ACP DISCUSS/DSCN MKR DOCD: CPT | Performed by: NURSE PRACTITIONER

## 2024-11-26 PROCEDURE — 1090F PRES/ABSN URINE INCON ASSESS: CPT | Performed by: NURSE PRACTITIONER

## 2024-11-26 PROCEDURE — G8420 CALC BMI NORM PARAMETERS: HCPCS | Performed by: NURSE PRACTITIONER

## 2024-11-26 PROCEDURE — G8484 FLU IMMUNIZE NO ADMIN: HCPCS | Performed by: NURSE PRACTITIONER

## 2024-11-26 PROCEDURE — 99215 OFFICE O/P EST HI 40 MIN: CPT | Performed by: NURSE PRACTITIONER

## 2024-11-26 PROCEDURE — 1160F RVW MEDS BY RX/DR IN RCRD: CPT | Performed by: NURSE PRACTITIONER

## 2024-11-26 NOTE — PROGRESS NOTES
if needed.  Patient agrees to go to ER if condition becomes emergent.      EMR Dragon/transcription disclaimer: Some of this encounter note is an electronic transcription/translation of spoken language to printed text. The electronic translation of spoken language may permit erroneous, or at times, nonsensical words or phrases to be inadvertently transcribed. Although I have reviewed the note for such errors, some may still exist.    Electronically signed by FIORELLA Sandoval CNP on 12/3/2024 at 12:16 PM

## 2024-11-30 ASSESSMENT — ENCOUNTER SYMPTOMS
SHORTNESS OF BREATH: 0
ABDOMINAL PAIN: 0
CONSTIPATION: 0
EYE DISCHARGE: 0
DIARRHEA: 0
BLOOD IN STOOL: 0
EYES NEGATIVE: 1
GASTROINTESTINAL NEGATIVE: 1
RESPIRATORY NEGATIVE: 1
SORE THROAT: 0
EYE PAIN: 0
COUGH: 0
EYE REDNESS: 0
VOMITING: 0
NAUSEA: 0
WHEEZING: 0
BACK PAIN: 0

## 2024-12-02 ENCOUNTER — TELEPHONE (OUTPATIENT)
Dept: CARDIOLOGY CLINIC | Age: 74
End: 2024-12-02

## 2024-12-02 ENCOUNTER — TELEPHONE (OUTPATIENT)
Dept: HEMATOLOGY | Age: 74
End: 2024-12-02

## 2024-12-02 ASSESSMENT — ENCOUNTER SYMPTOMS
DIARRHEA: 0
COUGH: 0
SORE THROAT: 0
NAUSEA: 0
COLOR CHANGE: 0
SHORTNESS OF BREATH: 0
CHEST TIGHTNESS: 0
VOMITING: 0
ABDOMINAL PAIN: 0

## 2024-12-02 NOTE — TELEPHONE ENCOUNTER
Patient and spouse called in with questions about afib, patient has not been seen here since 2022 and was recently in the ER twice for afib.  Patient has a f/u scheduled with us on 12/16.  Patient was diagnosed with afib approximately 2-3 years ago and has not had any issues with it that she's aware of.  Patient is not currently on a blood thinner of her own choosing, risks of not taking one with afib have been given to her several times and due to patient's multiple reactions to many different medications she still chooses to not take one.  Patient and spouse wondering how long they should let her episodes go on before going to the ER.  I asked what type of episodes she was having and she reported getting shaky, sweaty, clammy and general unwell feeling related to her heart going out of rhythm.  She advised her heart rate typically stays in the 80s or so and doesn't seem to get much  more over 100 and doesn't stay there if it does.  She states the episodes are starting to get very taxing and tend to happen daily now.  Patient wanting advice on what to do between now and her appt.  Advised patient again of the risks of not being on a blood thinner and she again declines interest in it at this time.  Advised patient as long as she was aware of the risk of not taking blood thinner that as long as her episodes were not lasting hours and heart rate not getting above 120s and staying there that we would not recommend going to the ER. She advised they typically last about 30 mins sometimes an hour.  Advised patient that as long as they were not lasting longer and her symptoms were not worsening as long as she was aware of the risk of no blood thinner when she went out of rhythm she would choose to stay at home.  Patient verbally understood.

## 2024-12-03 ASSESSMENT — ENCOUNTER SYMPTOMS
COUGH: 0
CHEST TIGHTNESS: 0
DIARRHEA: 0
NAUSEA: 0
ABDOMINAL PAIN: 0
SHORTNESS OF BREATH: 0
COLOR CHANGE: 0
VOMITING: 0
SORE THROAT: 0

## 2024-12-04 ASSESSMENT — ENCOUNTER SYMPTOMS
ABDOMINAL DISTENTION: 0
ABDOMINAL PAIN: 0
SHORTNESS OF BREATH: 0

## 2024-12-11 DIAGNOSIS — F41.9 ANXIETY: ICD-10-CM

## 2024-12-11 NOTE — TELEPHONE ENCOUNTER
Renee Bolton called to request a refill on her medication.      Last office visit : 11/26/2024   Next office visit : 12/19/2024     Last UDS:   Benzodiazepine Screen, Urine   Date Value Ref Range Status   05/08/2024 neg  Final     Buprenorphine Urine   Date Value Ref Range Status   05/08/2024 neg  Final     Cocaine Metabolite Screen, Urine   Date Value Ref Range Status   05/08/2024 neg  Final     Gabapentin Screen, Urine   Date Value Ref Range Status   05/08/2024 neg  Final     Oxycodone Screen, Ur   Date Value Ref Range Status   05/08/2024 neg  Final     Propoxyphene Screen, Urine   Date Value Ref Range Status   05/08/2024 neg  Final     THC Screen, Urine   Date Value Ref Range Status   05/08/2024 neg  Final     Tricyclic Antidepressants, Urine   Date Value Ref Range Status   05/08/2024 neg  Final       Last Matthieu: 10/17/24  Medication Contract: 11/2/23   Last Fill: 10/18/24    Requested Prescriptions     Pending Prescriptions Disp Refills    ALPRAZolam (XANAX) 0.5 MG tablet [Pharmacy Med Name: ALPRAZOLAM 0.5 MG TABS 0.5 Tablet] 30 tablet 3     Sig: TAKE ONE TABLET BY MOUTH NIGHTLY AS NEEDED FOR SLEEP OR ANXIETY FOR UP TO 30 DAYS                   Please approve or refuse this medication.   Angelica Bonds LPN

## 2024-12-12 RX ORDER — ALPRAZOLAM 0.5 MG
TABLET ORAL
Qty: 30 TABLET | Refills: 3 | Status: SHIPPED | OUTPATIENT
Start: 2024-12-12 | End: 2025-01-11

## 2024-12-13 DIAGNOSIS — E78.2 MIXED HYPERLIPIDEMIA: ICD-10-CM

## 2024-12-13 DIAGNOSIS — Z13.220 SCREENING FOR HYPERLIPIDEMIA: ICD-10-CM

## 2024-12-13 DIAGNOSIS — E55.9 VITAMIN D DEFICIENCY: ICD-10-CM

## 2024-12-13 DIAGNOSIS — R53.83 OTHER FATIGUE: ICD-10-CM

## 2024-12-13 LAB
25(OH)D3 SERPL-MCNC: 26.7 NG/ML
CHOLEST SERPL-MCNC: 210 MG/DL (ref 0–199)
HDLC SERPL-MCNC: 79 MG/DL (ref 40–60)
LDLC SERPL CALC-MCNC: 116 MG/DL
TRIGL SERPL-MCNC: 77 MG/DL (ref 0–149)
TSH SERPL DL<=0.005 MIU/L-ACNC: 1.76 UIU/ML (ref 0.27–4.2)

## 2024-12-16 ENCOUNTER — OFFICE VISIT (OUTPATIENT)
Dept: CARDIOLOGY CLINIC | Age: 74
End: 2024-12-16
Payer: MEDICARE

## 2024-12-16 VITALS
SYSTOLIC BLOOD PRESSURE: 144 MMHG | WEIGHT: 120 LBS | HEART RATE: 79 BPM | BODY MASS INDEX: 21.26 KG/M2 | OXYGEN SATURATION: 99 % | DIASTOLIC BLOOD PRESSURE: 86 MMHG

## 2024-12-16 DIAGNOSIS — R07.9 CHEST PAIN, UNSPECIFIED TYPE: ICD-10-CM

## 2024-12-16 DIAGNOSIS — I20.9 ANGINA PECTORIS (HCC): ICD-10-CM

## 2024-12-16 DIAGNOSIS — I48.0 PAROXYSMAL ATRIAL FIBRILLATION (HCC): Primary | ICD-10-CM

## 2024-12-16 PROCEDURE — 1123F ACP DISCUSS/DSCN MKR DOCD: CPT | Performed by: INTERNAL MEDICINE

## 2024-12-16 PROCEDURE — 1036F TOBACCO NON-USER: CPT | Performed by: INTERNAL MEDICINE

## 2024-12-16 PROCEDURE — G8427 DOCREV CUR MEDS BY ELIG CLIN: HCPCS | Performed by: INTERNAL MEDICINE

## 2024-12-16 PROCEDURE — 3017F COLORECTAL CA SCREEN DOC REV: CPT | Performed by: INTERNAL MEDICINE

## 2024-12-16 PROCEDURE — 1159F MED LIST DOCD IN RCRD: CPT | Performed by: INTERNAL MEDICINE

## 2024-12-16 PROCEDURE — G8420 CALC BMI NORM PARAMETERS: HCPCS | Performed by: INTERNAL MEDICINE

## 2024-12-16 PROCEDURE — 1090F PRES/ABSN URINE INCON ASSESS: CPT | Performed by: INTERNAL MEDICINE

## 2024-12-16 PROCEDURE — 99214 OFFICE O/P EST MOD 30 MIN: CPT | Performed by: INTERNAL MEDICINE

## 2024-12-16 PROCEDURE — G8399 PT W/DXA RESULTS DOCUMENT: HCPCS | Performed by: INTERNAL MEDICINE

## 2024-12-16 PROCEDURE — G8484 FLU IMMUNIZE NO ADMIN: HCPCS | Performed by: INTERNAL MEDICINE

## 2024-12-16 RX ORDER — METOPROLOL SUCCINATE 25 MG/1
25 TABLET, EXTENDED RELEASE ORAL DAILY
Qty: 90 TABLET | Refills: 3 | Status: SHIPPED | OUTPATIENT
Start: 2024-12-16 | End: 2025-03-16

## 2024-12-16 RX ORDER — WARFARIN SODIUM 5 MG/1
5 TABLET ORAL DAILY
Qty: 30 TABLET | Refills: 0 | Status: SHIPPED | OUTPATIENT
Start: 2024-12-16 | End: 2025-01-15

## 2024-12-16 ASSESSMENT — ENCOUNTER SYMPTOMS
EYE DISCHARGE: 0
FACIAL SWELLING: 0
VOMITING: 0
BLOOD IN STOOL: 0
SORE THROAT: 0
NAUSEA: 0
CHEST TIGHTNESS: 0
ABDOMINAL DISTENTION: 0
CONSTIPATION: 0
EYE REDNESS: 0
WHEEZING: 0
ABDOMINAL PAIN: 0
APNEA: 0
DIARRHEA: 0
EYE PAIN: 0
COUGH: 0
SHORTNESS OF BREATH: 0

## 2024-12-16 NOTE — PROGRESS NOTES
Pulmonary effort is normal. No tachypnea or respiratory distress.      Breath sounds: Normal breath sounds. No stridor. No wheezing, rhonchi or rales.   Chest:      Chest wall: No tenderness.   Abdominal:      General: Bowel sounds are normal. There is no distension.      Palpations: Abdomen is soft. There is no mass.      Tenderness: There is no abdominal tenderness. There is no guarding.   Musculoskeletal:         General: No swelling.      Cervical back: Normal range of motion and neck supple. No rigidity.      Right lower leg: No edema.      Left lower leg: No edema.   Skin:     General: Skin is warm and dry.      Coloration: Skin is not jaundiced.      Findings: No erythema or rash.   Neurological:      General: No focal deficit present.      Mental Status: She is alert and oriented to person, place, and time. Mental status is at baseline.   Psychiatric:         Mood and Affect: Mood normal.         Behavior: Behavior normal.         Thought Content: Thought content normal.           Labs:     LIVER PROFILE:   AST   Date Value Ref Range Status   11/25/2024 20 5 - 32 U/L Final     ALT   Date Value Ref Range Status   11/25/2024 21 5 - 33 U/L Final              Imaging:    - EKG : Sinus rhythm 79 bpm.  Nonspecific ST-T wave changes, chronic.        ASSESSMENT and PLAN:    Atypical chest pain  Paroxysmal atrial fibrillation, CHADS2 vascular risk score 3   Agreeable to start anticoagulation therapy  Referral to anticoagulation clinic for Coumadin with goal INR 2-3  Metoprolol XL 25 mg p.o. daily  2D echocardiogram Doppler, evaluate for progression of structural disease  Treadmill nuclear stress test, evaluate for possible ischemia    Peripheral vascular disease, mild bilateral carotid artery disease with less than 50% stenosis. No history of CVA/TIA.    Dyslipidemia goal LDL less than 70  Patient declined therapy, will readdress                  Electronically signed by Patrick Pearson MD on 12/16/2024 at 10:45

## 2024-12-18 ENCOUNTER — HOSPITAL ENCOUNTER (OUTPATIENT)
Age: 74
Discharge: HOME | End: 2024-12-20
Attending: INTERNAL MEDICINE
Payer: MEDICARE

## 2024-12-18 VITALS
DIASTOLIC BLOOD PRESSURE: 86 MMHG | SYSTOLIC BLOOD PRESSURE: 144 MMHG | WEIGHT: 120 LBS | BODY MASS INDEX: 21.26 KG/M2 | HEIGHT: 63 IN

## 2024-12-18 DIAGNOSIS — R07.9 CHEST PAIN, UNSPECIFIED TYPE: ICD-10-CM

## 2024-12-18 DIAGNOSIS — I48.0 PAROXYSMAL ATRIAL FIBRILLATION (HCC): ICD-10-CM

## 2024-12-18 PROCEDURE — 93356 MYOCRD STRAIN IMG SPCKL TRCK: CPT

## 2024-12-19 ENCOUNTER — OFFICE VISIT (OUTPATIENT)
Dept: PRIMARY CARE CLINIC | Age: 74
End: 2024-12-19

## 2024-12-19 ENCOUNTER — TELEPHONE (OUTPATIENT)
Dept: CARDIOLOGY CLINIC | Age: 74
End: 2024-12-19

## 2024-12-19 ENCOUNTER — TELEPHONE (OUTPATIENT)
Dept: PRIMARY CARE CLINIC | Age: 74
End: 2024-12-19

## 2024-12-19 VITALS
OXYGEN SATURATION: 96 % | SYSTOLIC BLOOD PRESSURE: 140 MMHG | HEART RATE: 89 BPM | DIASTOLIC BLOOD PRESSURE: 78 MMHG | TEMPERATURE: 97.8 F | HEIGHT: 63 IN | WEIGHT: 120.6 LBS | BODY MASS INDEX: 21.37 KG/M2

## 2024-12-19 DIAGNOSIS — I48.0 PAROXYSMAL ATRIAL FIBRILLATION (HCC): Primary | ICD-10-CM

## 2024-12-19 DIAGNOSIS — Z00.00 MEDICARE ANNUAL WELLNESS VISIT, SUBSEQUENT: Primary | ICD-10-CM

## 2024-12-19 LAB
ECHO AO ASC DIAM: 3 CM
ECHO AO ASCENDING AORTA INDEX: 1.92 CM/M2
ECHO AO ROOT DIAM: 1.9 CM
ECHO AO ROOT INDEX: 1.22 CM/M2
ECHO AO SINUS VALSALVA DIAM: 2.4 CM
ECHO AO SINUS VALSALVA INDEX: 1.54 CM/M2
ECHO AO ST JNCT DIAM: 2 CM
ECHO AV AREA PEAK VELOCITY: 2.9 CM2
ECHO AV AREA VTI: 2.6 CM2
ECHO AV AREA/BSA PEAK VELOCITY: 1.9 CM2/M2
ECHO AV AREA/BSA VTI: 1.7 CM2/M2
ECHO AV MEAN GRADIENT: 4 MMHG
ECHO AV MEAN VELOCITY: 0.9 M/S
ECHO AV PEAK GRADIENT: 8 MMHG
ECHO AV PEAK VELOCITY: 1.4 M/S
ECHO AV VELOCITY RATIO: 1
ECHO AV VTI: 29 CM
ECHO BSA: 1.56 M2
ECHO EST RA PRESSURE: 3 MMHG
ECHO IVC PROX: 1.4 CM
ECHO LA AREA 2C: 11.9 CM2
ECHO LA AREA 4C: 13.3 CM2
ECHO LA DIAMETER INDEX: 1.92 CM/M2
ECHO LA DIAMETER: 3 CM
ECHO LA MAJOR AXIS: 4.5 CM
ECHO LA MINOR AXIS: 4.6 CM
ECHO LA TO AORTIC ROOT RATIO: 1.58
ECHO LA VOL BP: 29 ML (ref 22–52)
ECHO LA VOL MOD A2C: 25 ML (ref 22–52)
ECHO LA VOL MOD A4C: 32 ML (ref 22–52)
ECHO LA VOL/BSA BIPLANE: 19 ML/M2 (ref 16–34)
ECHO LA VOLUME INDEX MOD A2C: 16 ML/M2 (ref 16–34)
ECHO LA VOLUME INDEX MOD A4C: 21 ML/M2 (ref 16–34)
ECHO LV E' LATERAL VELOCITY: 9.57 CM/S
ECHO LV E' SEPTAL VELOCITY: 6.42 CM/S
ECHO LV EDV 3D: 67 ML
ECHO LV EDV INDEX 3D: 43 ML/M2
ECHO LV EJECTION FRACTION 3D: 67 %
ECHO LV ESV 3D: 22 ML
ECHO LV ESV INDEX 3D: 14 ML/M2
ECHO LV FRACTIONAL SHORTENING: 44 % (ref 28–44)
ECHO LV GLOBAL LONGITUDINAL STRAIN (GLS): -20.3 %
ECHO LV INTERNAL DIMENSION DIASTOLE INDEX: 2.76 CM/M2
ECHO LV INTERNAL DIMENSION DIASTOLIC: 4.3 CM (ref 3.9–5.3)
ECHO LV INTERNAL DIMENSION SYSTOLIC INDEX: 1.54 CM/M2
ECHO LV INTERNAL DIMENSION SYSTOLIC: 2.4 CM
ECHO LV IVSD: 0.8 CM (ref 0.6–0.9)
ECHO LV MASS 2D: 105.3 G (ref 67–162)
ECHO LV MASS 3D INDEX: 54.5 G/M2
ECHO LV MASS 3D: 85 G
ECHO LV MASS INDEX 2D: 67.5 G/M2 (ref 43–95)
ECHO LV POSTERIOR WALL DIASTOLIC: 0.8 CM (ref 0.6–0.9)
ECHO LV RELATIVE WALL THICKNESS RATIO: 0.37
ECHO LVOT AREA: 2.8 CM2
ECHO LVOT AV VTI INDEX: 0.93
ECHO LVOT DIAM: 1.9 CM
ECHO LVOT MEAN GRADIENT: 4 MMHG
ECHO LVOT PEAK GRADIENT: 8 MMHG
ECHO LVOT PEAK VELOCITY: 1.4 M/S
ECHO LVOT STROKE VOLUME INDEX: 49 ML/M2
ECHO LVOT SV: 76.5 ML
ECHO LVOT VTI: 27 CM
ECHO MV A VELOCITY: 0.87 M/S
ECHO MV E DECELERATION TIME (DT): 172 MS
ECHO MV E VELOCITY: 0.91 M/S
ECHO MV E/A RATIO: 1.05
ECHO MV E/E' LATERAL: 9.51
ECHO MV E/E' RATIO (AVERAGED): 11.84
ECHO MV E/E' SEPTAL: 14.17
ECHO RA AREA 4C: 9.6 CM2
ECHO RA END SYSTOLIC VOLUME APICAL 4 CHAMBER INDEX BSA: 12 ML/M2
ECHO RA VOLUME: 18 ML
ECHO RIGHT VENTRICULAR SYSTOLIC PRESSURE (RVSP): 33 MMHG
ECHO RV BASAL DIMENSION: 3.2 CM
ECHO RV LONGITUDINAL DIMENSION: 5 CM
ECHO RV MID DIMENSION: 1.7 CM
ECHO RV TAPSE: 1.7 CM (ref 1.7–?)
ECHO TV REGURGITANT MAX VELOCITY: 2.75 M/S
ECHO TV REGURGITANT PEAK GRADIENT: 30 MMHG

## 2024-12-19 PROCEDURE — 93306 TTE W/DOPPLER COMPLETE: CPT | Performed by: INTERNAL MEDICINE

## 2024-12-19 PROCEDURE — 93356 MYOCRD STRAIN IMG SPCKL TRCK: CPT | Performed by: INTERNAL MEDICINE

## 2024-12-19 PROCEDURE — 76376 3D RENDER W/INTRP POSTPROCES: CPT | Performed by: INTERNAL MEDICINE

## 2024-12-19 ASSESSMENT — PATIENT HEALTH QUESTIONNAIRE - PHQ9
2. FEELING DOWN, DEPRESSED OR HOPELESS: SEVERAL DAYS
1. LITTLE INTEREST OR PLEASURE IN DOING THINGS: NOT AT ALL
SUM OF ALL RESPONSES TO PHQ9 QUESTIONS 1 & 2: 1
SUM OF ALL RESPONSES TO PHQ QUESTIONS 1-9: 1

## 2024-12-19 NOTE — PROGRESS NOTES
Medicare Annual Wellness Visit    Renee Francis is here for Medicare AWV    Assessment & Plan   Medicare annual wellness visit, subsequent  Recommendations for Preventive Services Due: see orders and patient instructions/AVS.  Recommended screening schedule for the next 5-10 years is provided to the patient in written form: see Patient Instructions/AVS.     Return in 3 months (on 3/19/2025).     Subjective       Patient's complete Health Risk Assessment and screening values have been reviewed and are found in Flowsheets. The following problems were reviewed today and where indicated follow up appointments were made and/or referrals ordered.    Positive Risk Factor Screenings with Interventions:     Cognitive:   Clock Drawing Test (CDT): (!) Abnormal  Words recalled: 3 Words Recalled  Total Score: 3  Total Score Interpretation: Normal Mini-Cog  Interventions:  Patient declines any further evaluation or treatment           General HRA Questions:  Select all that apply: (!) New or Increased Pain  Interventions - Pain:  Patient comments: States she was having some chest pains associated with a-fib. She states that it have since resolved and continues to follow with cardiology.      Inactivity:  On average, how many days per week do you engage in moderate to strenuous exercise (like a brisk walk)?: 1 day (!) Abnormal  On average, how many minutes do you engage in exercise at this level?: 20 min  Interventions:  Patient comments: Has been hesitant to push herself.  She will discuss further with cardiology.       Dentist Screen:  Have you seen the dentist within the past year?: (!) No    Intervention:  Patient declines any further evaluation or treatment     Vision Screen:     Have you had an eye exam within the past year?: (!) No  Interventions:   Patient comments: Last vision exam was a couple of years ago. Working to save money. She will call and set up and appointment    Safety:  Do you have non-slip mats or non-slip

## 2024-12-19 NOTE — RESULT ENCOUNTER NOTE
Workup underway for paroxysmal atrial fibrillation.  Mild to moderate tricuspid regurgitation on most recent echocardiogram.  Otherwise largely unremarkable.  No change to clinical management.  Continue current treatment plan.  Plan to repeat echocardiogram in 1 year

## 2024-12-19 NOTE — TELEPHONE ENCOUNTER
----- Message from FIORELLA Rosales CNP sent at 12/18/2024  4:54 PM CST -----  Cholesterol is improved. Her vitamin d remains low. Will discuss further at her upcoming appointment on Friday

## 2024-12-19 NOTE — TELEPHONE ENCOUNTER
Standing order placed. Patient will have to have blood checked tomorrow so when that comes in Erica can dose and have an MA call patient and let her know dosing instructions. Patient notified to call the office if she does not hear from someone in the morning.

## 2024-12-20 DIAGNOSIS — I48.0 PAROXYSMAL ATRIAL FIBRILLATION (HCC): ICD-10-CM

## 2024-12-20 LAB — INR BLD: 1.7 (ref 0.9–1.1)

## 2024-12-26 ENCOUNTER — TELEPHONE (OUTPATIENT)
Dept: CARDIOLOGY CLINIC | Age: 74
End: 2024-12-26

## 2024-12-26 DIAGNOSIS — I48.0 PAROXYSMAL ATRIAL FIBRILLATION (HCC): ICD-10-CM

## 2024-12-26 LAB — INR BLD: 4.2 (ref 0.9–1.1)

## 2024-12-26 NOTE — TELEPHONE ENCOUNTER
----- Message from FIORELLA Garcia sent at 12/26/2024 10:26 AM CST -----  Patient was new to Coumadin.  Currently taking 5 mg daily    Have her hold Coumadin tonight  Then start taking a half a tablet which would be 2.5 mg Monday Wednesday Friday  Recheck next week

## 2025-01-03 LAB — INR BLD: 1.1 (ref 0.9–1.1)

## 2025-01-08 ENCOUNTER — TELEPHONE (OUTPATIENT)
Dept: CARDIOLOGY CLINIC | Age: 75
End: 2025-01-08

## 2025-01-08 NOTE — TELEPHONE ENCOUNTER
Spoke with patient she wanted to have her INR checked tomorrow instead of Friday due to the weather

## 2025-01-09 LAB — INR BLD: 2.3 (ref 0.9–1.1)

## 2025-01-13 ENCOUNTER — ANTI-COAG VISIT (OUTPATIENT)
Dept: CARDIOLOGY CLINIC | Age: 75
End: 2025-01-13
Payer: MEDICARE

## 2025-01-13 DIAGNOSIS — I48.0 PAROXYSMAL A-FIB (HCC): Primary | ICD-10-CM

## 2025-01-13 DIAGNOSIS — Z79.01 LONG TERM (CURRENT) USE OF ANTICOAGULANTS: ICD-10-CM

## 2025-01-13 PROCEDURE — 93793 ANTICOAG MGMT PT WARFARIN: CPT | Performed by: CLINICAL NURSE SPECIALIST

## 2025-01-14 NOTE — PROGRESS NOTES
Anticoagulation Summary  As of 2025      INR goal:  2.0-3.0   TTR:  59.7% (2 d)   INR used for dosin.3 (2025)   Warfarin maintenance plan:  2.5 mg (5 mg x 0.5) every Wed; 5 mg (5 mg x 1) all other days   Weekly warfarin total:  32.5 mg   Plan last modified:  Camila Trejo MA (2025)   Next INR check:  2025   Target end date:  --             Anticoagulation Episode Summary       INR check location:  --    Preferred lab:  --    Send INR reminders to:  [facility]    Comments:  --          Anticoagulation Care Providers       Provider Role Specialty Phone number    Erica Mcguire APRN Referring Certified Nurse Specialist [phone]                Dosing Plan  As of 2025      TTR:  59.7% (2 d)   Full warfarin instructions:  2.5 mg every Wed; 5 mg all other days               Made Renee Francis aware of findings by phone.     Electronically signed by FIORELLA Jones on 25 at 8:02 AM CST

## 2025-01-16 ENCOUNTER — ANTI-COAG VISIT (OUTPATIENT)
Dept: CARDIOLOGY CLINIC | Age: 75
End: 2025-01-16
Payer: MEDICARE

## 2025-01-16 DIAGNOSIS — Z79.01 LONG TERM (CURRENT) USE OF ANTICOAGULANTS: ICD-10-CM

## 2025-01-16 DIAGNOSIS — I48.0 PAROXYSMAL ATRIAL FIBRILLATION (HCC): ICD-10-CM

## 2025-01-16 DIAGNOSIS — I48.0 PAROXYSMAL A-FIB (HCC): Primary | ICD-10-CM

## 2025-01-16 LAB — INR BLD: 2.3 (ref 0.9–1.1)

## 2025-01-16 PROCEDURE — 93793 ANTICOAG MGMT PT WARFARIN: CPT | Performed by: CLINICAL NURSE SPECIALIST

## 2025-01-16 NOTE — PROGRESS NOTES
Anticoagulation Summary  As of 2025      INR goal:  2.0-3.0   TTR:  88.7% (1.3 wk)   INR used for dosin.3 (2025)   Warfarin maintenance plan:  2.5 mg (5 mg x 0.5) every Wed; 5 mg (5 mg x 1) all other days   Weekly warfarin total:  32.5 mg   Plan last modified:  Camila Trejo MA (2025)   Next INR check:  2025   Target end date:  --             Anticoagulation Episode Summary       INR check location:  --    Preferred lab:  --    Send INR reminders to:  [facility]    Comments:  --          Anticoagulation Care Providers       Provider Role Specialty Phone number    Erica Mcguire APRN Referring Certified Nurse Specialist [phone]                Dosing Plan  As of 2025      TTR:  88.7% (1.3 wk)   Full warfarin instructions:  2.5 mg every Wed; 5 mg all other days               Made Renee Francis aware of findings by phone.     Electronically signed by FIORELLA Jones on 25 at 4:10 PM CST

## 2025-01-23 RX ORDER — WARFARIN SODIUM 5 MG/1
5 TABLET ORAL DAILY
Qty: 30 TABLET | Refills: 5 | Status: SHIPPED | OUTPATIENT
Start: 2025-01-23 | End: 2025-02-22

## 2025-01-29 ENCOUNTER — ANTI-COAG VISIT (OUTPATIENT)
Dept: CARDIOLOGY CLINIC | Age: 75
End: 2025-01-29
Payer: MEDICARE

## 2025-01-29 DIAGNOSIS — I48.0 PAROXYSMAL ATRIAL FIBRILLATION (HCC): ICD-10-CM

## 2025-01-29 DIAGNOSIS — Z79.01 LONG TERM (CURRENT) USE OF ANTICOAGULANTS: ICD-10-CM

## 2025-01-29 DIAGNOSIS — I48.0 PAROXYSMAL A-FIB (HCC): Primary | ICD-10-CM

## 2025-01-29 LAB
INR PPP: 2.27 (ref 0.88–1.18)
PROTHROMBIN TIME: 24.5 SEC (ref 12–14.6)

## 2025-01-29 PROCEDURE — 93793 ANTICOAG MGMT PT WARFARIN: CPT | Performed by: CLINICAL NURSE SPECIALIST

## 2025-02-17 DIAGNOSIS — I48.0 PAROXYSMAL ATRIAL FIBRILLATION (HCC): ICD-10-CM

## 2025-02-17 LAB — INR BLD: 2.8 (ref 0.9–1.1)

## 2025-03-04 ENCOUNTER — HOSPITAL ENCOUNTER (OUTPATIENT)
Dept: NUCLEAR MEDICINE | Age: 75
Discharge: HOME | End: 2025-03-06
Attending: INTERNAL MEDICINE
Payer: MEDICARE

## 2025-03-04 DIAGNOSIS — I20.9 ANGINA PECTORIS: ICD-10-CM

## 2025-03-04 LAB
NUC STRESS EJECTION FRACTION: 92 %
STRESS BASELINE DIAS BP: 83 MMHG
STRESS BASELINE HR: 71 BPM
STRESS BASELINE SYS BP: 153 MMHG
STRESS EXERCISE DUR MIN: 4 MIN
STRESS EXERCISE DUR SEC: 49 SEC
STRESS PEAK SYS BP: NORMAL MMHG
STRESS PERCENT HR ACHIEVED: 100 %
STRESS POST PEAK HR: 148 BPM
STRESS ST DEPRESSION: 1 MM
STRESS STAGE 1 BP: NORMAL MMHG
STRESS STAGE 1 HR: 107 BPM
STRESS STAGE RECOVERY 1 BP: NORMAL MMHG
STRESS STAGE RECOVERY 1 HR: 122 BPM
STRESS STAGE RECOVERY 2 BP: NORMAL MMHG
STRESS STAGE RECOVERY 2 HR: 74 BPM
STRESS TARGET HR: 148 BPM

## 2025-03-04 PROCEDURE — 93017 CV STRESS TEST TRACING ONLY: CPT

## 2025-03-04 PROCEDURE — 3430000000 HC RX DIAGNOSTIC RADIOPHARMACEUTICAL: Performed by: INTERNAL MEDICINE

## 2025-03-04 PROCEDURE — A9502 TC99M TETROFOSMIN: HCPCS | Performed by: INTERNAL MEDICINE

## 2025-03-04 RX ADMIN — TETROFOSMIN 24 MILLICURIE: 0.23 INJECTION, POWDER, LYOPHILIZED, FOR SOLUTION INTRAVENOUS at 11:29

## 2025-03-04 RX ADMIN — TETROFOSMIN 8 MILLICURIE: 0.23 INJECTION, POWDER, LYOPHILIZED, FOR SOLUTION INTRAVENOUS at 11:29

## 2025-03-05 ENCOUNTER — TELEPHONE (OUTPATIENT)
Dept: CARDIOLOGY CLINIC | Age: 75
End: 2025-03-05

## 2025-03-05 RX ORDER — METOPROLOL SUCCINATE 25 MG/1
25 TABLET, EXTENDED RELEASE ORAL 2 TIMES DAILY
Qty: 180 TABLET | Refills: 0 | Status: SHIPPED | OUTPATIENT
Start: 2025-03-05 | End: 2025-06-03

## 2025-03-05 NOTE — RESULT ENCOUNTER NOTE
No evidence for blood supply/demand mismatch (no ischemia).  Patient noted to have had an hypertensive response to stress with peak blood pressure 212/79 mmHg.  Currently on metoprolol XL 25 mg once daily, changed to twice daily (prescription sent to [facility]).

## 2025-03-11 DIAGNOSIS — I48.0 PAROXYSMAL ATRIAL FIBRILLATION (HCC): ICD-10-CM

## 2025-03-11 LAB — INR BLD: 2.1 (ref 0.9–1.1)

## 2025-03-12 ENCOUNTER — ANTI-COAG VISIT (OUTPATIENT)
Dept: CARDIOLOGY CLINIC | Age: 75
End: 2025-03-12
Payer: MEDICARE

## 2025-03-12 ENCOUNTER — RESULTS FOLLOW-UP (OUTPATIENT)
Dept: CARDIOLOGY CLINIC | Age: 75
End: 2025-03-12

## 2025-03-12 DIAGNOSIS — Z79.01 LONG TERM (CURRENT) USE OF ANTICOAGULANTS: ICD-10-CM

## 2025-03-12 DIAGNOSIS — I48.0 PAROXYSMAL A-FIB (HCC): Primary | ICD-10-CM

## 2025-03-12 PROCEDURE — 93793 ANTICOAG MGMT PT WARFARIN: CPT | Performed by: CLINICAL NURSE SPECIALIST

## 2025-03-13 DIAGNOSIS — F41.9 ANXIETY: ICD-10-CM

## 2025-03-13 RX ORDER — ALPRAZOLAM 0.5 MG
TABLET ORAL
Qty: 30 TABLET | Refills: 4 | Status: SHIPPED | OUTPATIENT
Start: 2025-03-13 | End: 2025-04-12

## 2025-03-13 RX ORDER — METOPROLOL SUCCINATE 25 MG/1
25 TABLET, EXTENDED RELEASE ORAL DAILY
Qty: 90 TABLET | Refills: 4 | OUTPATIENT
Start: 2025-03-13

## 2025-03-13 NOTE — TELEPHONE ENCOUNTER
Renee Bolton called to request a refill on her medication.      Last office visit : 12/19/2024   Next office visit : 3/14/2025     Last UDS: 05/08/2024  Benzodiazepine Screen, Urine   Date Value Ref Range Status   05/08/2024 neg  Final     Buprenorphine Urine   Date Value Ref Range Status   05/08/2024 neg  Final     Cocaine Metabolite Screen, Urine   Date Value Ref Range Status   05/08/2024 neg  Final     Gabapentin Screen, Urine   Date Value Ref Range Status   05/08/2024 neg  Final     Oxycodone Screen, Ur   Date Value Ref Range Status   05/08/2024 neg  Final     Propoxyphene Screen, Urine   Date Value Ref Range Status   05/08/2024 neg  Final     THC Screen, Urine   Date Value Ref Range Status   05/08/2024 neg  Final     Tricyclic Antidepressants, Urine   Date Value Ref Range Status   05/08/2024 neg  Final       Last Matthieu: 03/13/2025  Medication Contract: needs new Medication Contract   Last Fill: 02/10/2025    Requested Prescriptions     Pending Prescriptions Disp Refills    ALPRAZolam (XANAX) 0.5 MG tablet [Pharmacy Med Name: ALPRAZOLAM 0.5 MG TABS 0.5 Tablet] 30 tablet 4     Sig: TAKE ONE TABLET BY MOUTH NIGHTLY AS NEEDED FOR SLEEP OR ANXIETY FOR UP TO 30 DAYS         Please approve or refuse this medication.   Rosy Hendrix MA

## 2025-03-14 ENCOUNTER — TELEPHONE (OUTPATIENT)
Dept: CARDIOLOGY CLINIC | Age: 75
End: 2025-03-14

## 2025-03-14 ENCOUNTER — OFFICE VISIT (OUTPATIENT)
Dept: PRIMARY CARE CLINIC | Age: 75
End: 2025-03-14

## 2025-03-14 VITALS
SYSTOLIC BLOOD PRESSURE: 130 MMHG | DIASTOLIC BLOOD PRESSURE: 84 MMHG | HEIGHT: 63 IN | BODY MASS INDEX: 20.98 KG/M2 | WEIGHT: 118.4 LBS | TEMPERATURE: 98 F | HEART RATE: 89 BPM | OXYGEN SATURATION: 98 %

## 2025-03-14 DIAGNOSIS — F41.9 ANXIETY: ICD-10-CM

## 2025-03-14 DIAGNOSIS — Z79.899 DRUG THERAPY: Primary | ICD-10-CM

## 2025-03-14 DIAGNOSIS — R03.0 ELEVATED BLOOD PRESSURE READING: ICD-10-CM

## 2025-03-14 LAB
ALCOHOL URINE: NORMAL
AMPHETAMINE SCREEN URINE: NORMAL
BARBITURATE SCREEN URINE: NORMAL
BENZODIAZEPINE SCREEN, URINE: NORMAL
BUPRENORPHINE URINE: NORMAL
COCAINE METABOLITE SCREEN URINE: NORMAL
FENTANYL SCREEN, URINE: NORMAL
GABAPENTIN SCREEN, URINE: NORMAL
MDMA, URINE: NORMAL
METHADONE SCREEN, URINE: NORMAL
METHAMPHETAMINE, URINE: NORMAL
OPIATE SCREEN URINE: NORMAL
OXYCODONE SCREEN URINE: NORMAL
PHENCYCLIDINE SCREEN URINE: NORMAL
PROPOXYPHENE SCREEN, URINE: NORMAL
SYNTHETIC CANNABINOIDS(K2) SCREEN, URINE: NORMAL
THC SCREEN, URINE: NORMAL
TRAMADOL SCREEN URINE: NORMAL
TRICYCLIC ANTIDEPRESSANTS, UR: NORMAL

## 2025-03-14 RX ORDER — METOPROLOL SUCCINATE 25 MG/1
25 TABLET, EXTENDED RELEASE ORAL 2 TIMES DAILY
Qty: 180 TABLET | Refills: 1 | Status: SHIPPED | OUTPATIENT
Start: 2025-03-14 | End: 2025-06-12

## 2025-03-14 SDOH — ECONOMIC STABILITY: FOOD INSECURITY: WITHIN THE PAST 12 MONTHS, YOU WORRIED THAT YOUR FOOD WOULD RUN OUT BEFORE YOU GOT MONEY TO BUY MORE.: NEVER TRUE

## 2025-03-14 SDOH — ECONOMIC STABILITY: FOOD INSECURITY: WITHIN THE PAST 12 MONTHS, THE FOOD YOU BOUGHT JUST DIDN'T LAST AND YOU DIDN'T HAVE MONEY TO GET MORE.: NEVER TRUE

## 2025-03-14 ASSESSMENT — PATIENT HEALTH QUESTIONNAIRE - PHQ9
SUM OF ALL RESPONSES TO PHQ QUESTIONS 1-9: 1
2. FEELING DOWN, DEPRESSED OR HOPELESS: SEVERAL DAYS
SUM OF ALL RESPONSES TO PHQ QUESTIONS 1-9: 1
SUM OF ALL RESPONSES TO PHQ QUESTIONS 1-9: 1
1. LITTLE INTEREST OR PLEASURE IN DOING THINGS: NOT AT ALL
SUM OF ALL RESPONSES TO PHQ QUESTIONS 1-9: 1

## 2025-03-20 ASSESSMENT — ENCOUNTER SYMPTOMS
COLOR CHANGE: 0
NAUSEA: 0
COUGH: 0
SORE THROAT: 0
CHEST TIGHTNESS: 0
SHORTNESS OF BREATH: 0
ABDOMINAL PAIN: 0
VOMITING: 0
DIARRHEA: 0

## 2025-03-20 NOTE — PROGRESS NOTES
Ms.Bonny Francis is a 74 y.o. female who presents today for  Chief Complaint   Patient presents with    3 Month Follow-Up       HPI:  History of Present Illness  The patient presents for evaluation of blood pressure management, anxiety, and health maintenance.    She has been monitoring her blood pressure at home, with readings ranging from 133/74 to 156/80 over the past week. She reports no new complaints or concerns. Her cardiologist recently adjusted her medication regimen, increasing her dosage of Toprol from one to two tablets daily.    She is currently on warfarin therapy, with monthly monitoring of her clotting parameters. Her INR levels have consistently fallen within the therapeutic range of 2 to 3, specifically between 2.1 and 2.8.    She has not yet completed her Cologuard test or colonoscopy. She has received the swabs for the Cologuard test but has been delaying its completion. She expresses a preference for the Cologuard test over a colonoscopy, citing difficulty in tolerating the preparation for the latter. Her previous colonoscopy revealed the presence of internal hemorrhoids.    She reports that her anxiety is well-managed with Xanax. She has been attempting to minimize her medication use, taking only what she perceives as necessary. She is uncertain about the appropriate dosage, as she has been dividing the tablets into quarters and consuming three to four quarters daily. She expresses a dislike for medication use but acknowledges that her current regimen appears effective. She typically takes a quarter tablet in the morning and evening and occasionally increases the dose to half a tablet if she feels it is needed.    Supplemental Information  She has a scheduled follow-up appointment with Gisele Grant in oncology on 03/21/2025. She is trying to reschedule it to the first or second week of April.    MEDICATIONS  Current: Toprol, warfarin, Xanax       Results  Laboratory Studies  INR levels between 2.1

## 2025-03-31 ENCOUNTER — TELEPHONE (OUTPATIENT)
Dept: HEMATOLOGY | Age: 75
End: 2025-03-31

## 2025-03-31 NOTE — TELEPHONE ENCOUNTER
Called Patient and reminded patient of their appointment on 04/04/2025 and patient confirmed they would be here. Reminded patient to just come at appointment time, and to not come at the lab appointment time. Reminded patient that we will not check them in any more than 30 minutes before appointment time.  We have now moved to the [facility] [place] that is located between our old office and the ER at the \[facility]". Letting the Pt know that our front entrance faces the  [employer] [place]. Reminded pt to eat well and be well hydrated for their labs.

## 2025-04-03 DIAGNOSIS — D47.2 SMOLDERING MULTIPLE MYELOMA: Primary | ICD-10-CM

## 2025-04-03 NOTE — PROGRESS NOTES
Progress Note      Pt Name: Renee Frnacis  YOB: 1950  MRN: [id number]    Date of evaluation: 04/04/2025  History Obtained From:  patient, electronic medical record    CHIEF COMPLAINT:    Chief Complaint   Patient presents with    Follow-up     Chronic atrial fibrillation (HCC)     HISTORY OF PRESENT ILLNESS:    Renee Francis is a 74 y.o.  female who is currently being followed for smoldering multiple myeloma, IgG kappa restricted, initially diagnosed November 2016, with no known myeloma defining events and yet to require intervention.  Current recommendation is for routine surveillance and lab monitoring.  Myeloma studies on 11/21/2024 were overall stable with no significant new concerning.  Renee returns today in follow-up, lab monitoring and further treatment recommendations.      History of Present Illness  No new health concerns are reported since the last visit. She has not yet completed the urine and skeletal survey to financial restraints. A desire to receive printouts of calcium and protein levels for comparison with previous results is expressed.       Today's clinic visit to include physical assessment, review of systems, any lab or radiographic findings that were available and plan of care are documented below.    ONCOLOGIC HISTORY:   Diagnosis  Smoldering multiple myeloma, November 2016  IgG kappa restricted     Treatment summary  Close laboratory/clinical surveillance     Renee was seen on initial hematologic consultation by  on 8/4/2016 as a referral by Dr. Stephani Montgomery for evaluation of a monoclonal protein finding. She was seen by her primary care for a annual visit in July 2016 and was found to have a elevated protein at 9.3 and mildly elevated calcium levels at 10.7.     This prompted a workup with serum protein electrophoresis which showed a M spike of 2, also confirmed by us on 8/4/2016. K/L light chains ratio 4.08. IgG elevated at 2258 mg/dl. IgA low at

## 2025-04-04 ENCOUNTER — OFFICE VISIT (OUTPATIENT)
Dept: HEMATOLOGY | Age: 75
End: 2025-04-04
Payer: MEDICARE

## 2025-04-04 ENCOUNTER — HOSPITAL ENCOUNTER (OUTPATIENT)
Dept: INFUSION THERAPY | Age: 75
Discharge: HOME | End: 2025-04-04
Payer: MEDICARE

## 2025-04-04 VITALS
TEMPERATURE: 98.7 F | OXYGEN SATURATION: 100 % | WEIGHT: 119.5 LBS | BODY MASS INDEX: 21.17 KG/M2 | SYSTOLIC BLOOD PRESSURE: 130 MMHG | DIASTOLIC BLOOD PRESSURE: 76 MMHG | HEART RATE: 67 BPM | HEIGHT: 63 IN

## 2025-04-04 DIAGNOSIS — D47.2 SMOLDERING MULTIPLE MYELOMA: ICD-10-CM

## 2025-04-04 DIAGNOSIS — D47.2 SMOLDERING MULTIPLE MYELOMA: Primary | ICD-10-CM

## 2025-04-04 DIAGNOSIS — Z71.89 CARE PLAN DISCUSSED WITH PATIENT: ICD-10-CM

## 2025-04-04 DIAGNOSIS — I48.20 CHRONIC ATRIAL FIBRILLATION (HCC): ICD-10-CM

## 2025-04-04 LAB
ALBUMIN SERPL-MCNC: 4 G/DL (ref 3.5–5.2)
ALP SERPL-CCNC: 83 U/L (ref 35–104)
ALT SERPL-CCNC: 19 U/L (ref 5–33)
ANION GAP SERPL CALCULATED.3IONS-SCNC: 11 MMOL/L (ref 7–19)
AST SERPL-CCNC: 23 U/L (ref 5–32)
BASOPHILS # BLD: 0.05 K/UL (ref 0–0.2)
BASOPHILS NFR BLD: 0.7 % (ref 0–1)
BILIRUB SERPL-MCNC: 0.4 MG/DL (ref 0–1.2)
BUN SERPL-MCNC: 9 MG/DL (ref 8–23)
CALCIUM SERPL-MCNC: 9.5 MG/DL (ref 8.8–10.2)
CHLORIDE SERPL-SCNC: 101 MMOL/L (ref 98–107)
CO2 SERPL-SCNC: 27 MMOL/L (ref 22–29)
CREAT SERPL-MCNC: 0.7 MG/DL (ref 0.5–0.9)
EOSINOPHIL # BLD: 0.14 K/UL (ref 0–0.6)
EOSINOPHIL NFR BLD: 2.1 % (ref 0–5)
ERYTHROCYTE [DISTWIDTH] IN BLOOD BY AUTOMATED COUNT: 12.9 % (ref 11.5–14.5)
GLUCOSE SERPL-MCNC: 116 MG/DL (ref 70–99)
HCT VFR BLD AUTO: 35.6 % (ref 37–47)
HGB BLD-MCNC: 12.2 G/DL (ref 12–16)
LYMPHOCYTES # BLD: 1.93 K/UL (ref 1.1–4.5)
LYMPHOCYTES NFR BLD: 28.7 % (ref 20–40)
MCH RBC QN AUTO: 31.8 PG (ref 27–31)
MCHC RBC AUTO-ENTMCNC: 34.3 G/DL (ref 33–37)
MCV RBC AUTO: 92.7 FL (ref 81–99)
MONOCYTES # BLD: 0.35 K/UL (ref 0–0.9)
MONOCYTES NFR BLD: 5.2 % (ref 1–10)
NEUTROPHILS # BLD: 4.24 K/UL (ref 1.5–7.5)
NEUTS SEG NFR BLD: 63 % (ref 50–65)
PLATELET # BLD AUTO: 288 K/UL (ref 130–400)
PMV BLD AUTO: 10.3 FL (ref 9.4–12.3)
POTASSIUM SERPL-SCNC: 3.8 MMOL/L (ref 3.5–5.1)
PROT SERPL-MCNC: 8.4 G/DL (ref 6.4–8.3)
RBC # BLD AUTO: 3.84 M/UL (ref 4.2–5.4)
SODIUM SERPL-SCNC: 139 MMOL/L (ref 136–145)
WBC # BLD AUTO: 6.73 K/UL (ref 4.8–10.8)

## 2025-04-04 PROCEDURE — 1090F PRES/ABSN URINE INCON ASSESS: CPT | Performed by: NURSE PRACTITIONER

## 2025-04-04 PROCEDURE — 83521 IG LIGHT CHAINS FREE EACH: CPT

## 2025-04-04 PROCEDURE — 86334 IMMUNOFIX E-PHORESIS SERUM: CPT

## 2025-04-04 PROCEDURE — 84155 ASSAY OF PROTEIN SERUM: CPT

## 2025-04-04 PROCEDURE — G8428 CUR MEDS NOT DOCUMENT: HCPCS | Performed by: NURSE PRACTITIONER

## 2025-04-04 PROCEDURE — 82232 ASSAY OF BETA-2 PROTEIN: CPT

## 2025-04-04 PROCEDURE — 99213 OFFICE O/P EST LOW 20 MIN: CPT | Performed by: NURSE PRACTITIONER

## 2025-04-04 PROCEDURE — 1126F AMNT PAIN NOTED NONE PRSNT: CPT | Performed by: NURSE PRACTITIONER

## 2025-04-04 PROCEDURE — 84165 PROTEIN E-PHORESIS SERUM: CPT

## 2025-04-04 PROCEDURE — 82784 ASSAY IGA/IGD/IGG/IGM EACH: CPT

## 2025-04-04 PROCEDURE — 3017F COLORECTAL CA SCREEN DOC REV: CPT | Performed by: NURSE PRACTITIONER

## 2025-04-04 PROCEDURE — 99213 OFFICE O/P EST LOW 20 MIN: CPT

## 2025-04-04 PROCEDURE — 1036F TOBACCO NON-USER: CPT | Performed by: NURSE PRACTITIONER

## 2025-04-04 PROCEDURE — 36415 COLL VENOUS BLD VENIPUNCTURE: CPT

## 2025-04-04 PROCEDURE — G8420 CALC BMI NORM PARAMETERS: HCPCS | Performed by: NURSE PRACTITIONER

## 2025-04-04 PROCEDURE — 83883 ASSAY NEPHELOMETRY NOT SPEC: CPT

## 2025-04-04 PROCEDURE — 85025 COMPLETE CBC W/AUTO DIFF WBC: CPT

## 2025-04-04 PROCEDURE — G8399 PT W/DXA RESULTS DOCUMENT: HCPCS | Performed by: NURSE PRACTITIONER

## 2025-04-04 PROCEDURE — 80053 COMPREHEN METABOLIC PANEL: CPT

## 2025-04-04 PROCEDURE — 1123F ACP DISCUSS/DSCN MKR DOCD: CPT | Performed by: NURSE PRACTITIONER

## 2025-04-07 ENCOUNTER — RESULTS FOLLOW-UP (OUTPATIENT)
Dept: HEMATOLOGY | Age: 75
End: 2025-04-07

## 2025-04-07 LAB — B2 MICROGLOB SERPL-MCNC: 1.7 MG/L

## 2025-04-08 LAB
ALBUMIN SERPL-MCNC: 4.23 G/DL (ref 3.75–5.01)
ALPHA1 GLOB SERPL ELPH-MCNC: 0.24 G/DL (ref 0.19–0.46)
ALPHA2 GLOB SERPL ELPH-MCNC: 0.65 G/DL (ref 0.48–1.05)
B-GLOBULIN SERPL ELPH-MCNC: 1.61 G/DL (ref 0.48–1.1)
DEPRECATED KAPPA LC FREE/LAMBDA SER: 18.1 {RATIO} (ref 0.26–1.65)
EER MONOCLONAL PROTEIN AND FLC, SERUM: ABNORMAL
GAMMA GLOB SERPL ELPH-MCNC: 1.78 G/DL (ref 0.62–1.51)
IGA SERPL-MCNC: 50 MG/DL (ref 68–408)
IGG SERPL-MCNC: 2824 MG/DL (ref 768–1632)
IGM SERPL-MCNC: 32 MG/DL (ref 35–263)
INTERPRETATION SERPL IFE-IMP: ABNORMAL
INTERPRETATION SERPL IFE-IMP: ABNORMAL
KAPPA LC FREE SER-MCNC: 127.44 MG/L (ref 3.3–19.4)
LAMBDA LC FREE SERPL-MCNC: 7.04 MG/L (ref 5.71–26.3)
MONOCLONAL PROTEIN, SERUM: 2.76 G/DL
PROT SERPL-MCNC: 8.5 G/DL (ref 6.3–8.2)

## 2025-04-10 DIAGNOSIS — I48.0 PAROXYSMAL ATRIAL FIBRILLATION (HCC): ICD-10-CM

## 2025-04-10 LAB — INR BLD: 2.2 (ref 0.9–1.1)

## 2025-05-12 DIAGNOSIS — I48.0 PAROXYSMAL ATRIAL FIBRILLATION (HCC): ICD-10-CM

## 2025-05-12 LAB — INR BLD: 1.8 (ref 0.9–1.1)

## 2025-05-19 DIAGNOSIS — Z12.11 ENCOUNTER FOR SCREENING FECAL OCCULT BLOOD TESTING: ICD-10-CM

## 2025-05-19 LAB
HEMOCCULT STL QL: NEGATIVE

## 2025-05-19 PROCEDURE — 82270 OCCULT BLOOD FECES: CPT | Performed by: NURSE PRACTITIONER

## 2025-05-20 ENCOUNTER — ANTI-COAG VISIT (OUTPATIENT)
Dept: CARDIOLOGY CLINIC | Age: 75
End: 2025-05-20

## 2025-05-20 ENCOUNTER — RESULTS FOLLOW-UP (OUTPATIENT)
Dept: PRIMARY CARE CLINIC | Age: 75
End: 2025-05-20

## 2025-05-21 NOTE — TELEPHONE ENCOUNTER
----- Message from FIORELLA Rosales CNP sent at 5/20/2025  2:52 PM CDT -----  Please notify patient of negative results

## 2025-07-01 RX ORDER — WARFARIN SODIUM 5 MG/1
5 TABLET ORAL DAILY
Qty: 30 TABLET | Refills: 5 | Status: SHIPPED | OUTPATIENT
Start: 2025-07-01 | End: 2025-07-31

## 2025-07-09 ENCOUNTER — ANTI-COAG VISIT (OUTPATIENT)
Dept: CARDIOLOGY CLINIC | Age: 75
End: 2025-07-09
Payer: MEDICARE

## 2025-07-09 DIAGNOSIS — I48.0 PAROXYSMAL A-FIB (HCC): Primary | ICD-10-CM

## 2025-07-09 DIAGNOSIS — Z79.01 LONG TERM (CURRENT) USE OF ANTICOAGULANTS: ICD-10-CM

## 2025-07-09 DIAGNOSIS — I48.0 PAROXYSMAL ATRIAL FIBRILLATION (HCC): ICD-10-CM

## 2025-07-09 LAB — INR BLD: 3.4 (ref 0.9–1.1)

## 2025-07-09 PROCEDURE — 93793 ANTICOAG MGMT PT WARFARIN: CPT | Performed by: NURSE PRACTITIONER

## 2025-07-10 NOTE — PROGRESS NOTES
Anticoagulation Summary  As of 7/9/2025      INR goal:  2.0-3.0   TTR:  78.8% (6.1 mo)   INR used for dosing:  3.4 (7/9/2025)   Warfarin maintenance plan:  2.5 mg (5 mg x 0.5) every Wed, Fri; 5 mg (5 mg x 1) all other days   Weekly warfarin total:  30 mg   Plan last modified:  Elsa Faith MA (7/9/2025)   Next INR check:  8/9/2025   Target end date:  --             Anticoagulation Episode Summary       INR check location:  --    Preferred lab:  --    Send INR reminders to:  [facility]    Comments:  --          Anticoagulation Care Providers       Provider Role Specialty Phone number    Erica Mcguire APRN Referring Cardiovascular Disease [phone]                Dosing Plan  As of 7/9/2025      TTR:  78.8% (6.1 mo)   Full warfarin instructions:  7/9: 2.5 mg; Otherwise 2.5 mg every Wed, Fri; 5 mg all other days               Made Renee Francis aware of findings by phone.     Electronically signed by FIORELLA Pal NP on 7/10/25 at 9:01 AM CDT

## 2025-08-05 ENCOUNTER — TELEPHONE (OUTPATIENT)
Dept: HEMATOLOGY | Age: 75
End: 2025-08-05

## 2025-08-07 ENCOUNTER — ANTI-COAG VISIT (OUTPATIENT)
Dept: CARDIOLOGY CLINIC | Age: 75
End: 2025-08-07

## 2025-08-07 DIAGNOSIS — I48.0 PAROXYSMAL ATRIAL FIBRILLATION (HCC): ICD-10-CM

## 2025-08-07 DIAGNOSIS — I48.0 PAROXYSMAL A-FIB (HCC): Primary | ICD-10-CM

## 2025-08-07 DIAGNOSIS — Z79.01 LONG TERM (CURRENT) USE OF ANTICOAGULANTS: ICD-10-CM

## 2025-08-07 LAB — INR BLD: 2 (ref 0.9–1.1)

## 2025-08-08 DIAGNOSIS — D47.2 SMOLDERING MULTIPLE MYELOMA: Primary | ICD-10-CM

## 2025-08-12 RX ORDER — METOPROLOL SUCCINATE 25 MG/1
25 TABLET, EXTENDED RELEASE ORAL 2 TIMES DAILY
Qty: 180 TABLET | Refills: 0 | Status: SHIPPED | OUTPATIENT
Start: 2025-08-12

## 2025-08-19 ENCOUNTER — TELEPHONE (OUTPATIENT)
Dept: HEMATOLOGY | Age: 75
End: 2025-08-19

## 2025-08-21 DIAGNOSIS — D47.2 SMOLDERING MULTIPLE MYELOMA: ICD-10-CM

## 2025-08-22 ENCOUNTER — OFFICE VISIT (OUTPATIENT)
Dept: HEMATOLOGY | Age: 75
End: 2025-08-22
Payer: MEDICARE

## 2025-08-22 ENCOUNTER — HOSPITAL ENCOUNTER (OUTPATIENT)
Dept: INFUSION THERAPY | Age: 75
Discharge: HOME | End: 2025-08-22
Payer: MEDICARE

## 2025-08-22 VITALS
DIASTOLIC BLOOD PRESSURE: 80 MMHG | BODY MASS INDEX: 22.27 KG/M2 | TEMPERATURE: 97.7 F | OXYGEN SATURATION: 99 % | SYSTOLIC BLOOD PRESSURE: 160 MMHG | HEIGHT: 63 IN | WEIGHT: 125.7 LBS | HEART RATE: 68 BPM

## 2025-08-22 DIAGNOSIS — D47.2 SMOLDERING MULTIPLE MYELOMA: Primary | ICD-10-CM

## 2025-08-22 DIAGNOSIS — D47.2 SMOLDERING MULTIPLE MYELOMA: ICD-10-CM

## 2025-08-22 DIAGNOSIS — Z71.89 CARE PLAN DISCUSSED WITH PATIENT: ICD-10-CM

## 2025-08-22 DIAGNOSIS — I48.20 CHRONIC ATRIAL FIBRILLATION (HCC): ICD-10-CM

## 2025-08-22 LAB
ALBUMIN SERPL-MCNC: 4.1 G/DL (ref 3.5–5.2)
ALP SERPL-CCNC: 90 U/L (ref 35–104)
ALT SERPL-CCNC: 33 U/L (ref 5–33)
ANION GAP SERPL CALCULATED.3IONS-SCNC: 11 MMOL/L (ref 7–19)
AST SERPL-CCNC: 31 U/L (ref 5–32)
BASOPHILS # BLD: 0.05 K/UL (ref 0–0.2)
BASOPHILS NFR BLD: 0.6 % (ref 0–1)
BILIRUB SERPL-MCNC: 0.5 MG/DL (ref 0–1.2)
BUN SERPL-MCNC: 13 MG/DL (ref 8–23)
CALCIUM SERPL-MCNC: 10.1 MG/DL (ref 8.8–10.2)
CHLORIDE SERPL-SCNC: 96 MMOL/L (ref 98–107)
CO2 SERPL-SCNC: 28 MMOL/L (ref 22–29)
CREAT SERPL-MCNC: 0.7 MG/DL (ref 0.5–0.9)
EOSINOPHIL # BLD: 0.13 K/UL (ref 0–0.6)
EOSINOPHIL NFR BLD: 1.5 % (ref 0–5)
ERYTHROCYTE [DISTWIDTH] IN BLOOD BY AUTOMATED COUNT: 12.6 % (ref 11.5–14.5)
GLUCOSE SERPL-MCNC: 155 MG/DL (ref 70–99)
HCT VFR BLD AUTO: 38.3 % (ref 37–47)
HGB BLD-MCNC: 13.3 G/DL (ref 12–16)
LYMPHOCYTES # BLD: 2.32 K/UL (ref 1.1–4.5)
LYMPHOCYTES NFR BLD: 27.4 % (ref 20–40)
MCH RBC QN AUTO: 32.1 PG (ref 27–31)
MCHC RBC AUTO-ENTMCNC: 34.7 G/DL (ref 33–37)
MCV RBC AUTO: 92.5 FL (ref 81–99)
MONOCYTES # BLD: 0.33 K/UL (ref 0–0.9)
MONOCYTES NFR BLD: 3.9 % (ref 1–10)
NEUTROPHILS # BLD: 5.63 K/UL (ref 1.5–7.5)
NEUTS SEG NFR BLD: 66.5 % (ref 50–65)
PLATELET # BLD AUTO: 333 K/UL (ref 130–400)
PMV BLD AUTO: 10.2 FL (ref 9.4–12.3)
POTASSIUM SERPL-SCNC: 3.6 MMOL/L (ref 3.5–5.1)
PROT SERPL-MCNC: 9.1 G/DL (ref 6.4–8.3)
RBC # BLD AUTO: 4.14 M/UL (ref 4.2–5.4)
SODIUM SERPL-SCNC: 135 MMOL/L (ref 136–145)
WBC # BLD AUTO: 8.47 K/UL (ref 4.8–10.8)

## 2025-08-22 PROCEDURE — 84155 ASSAY OF PROTEIN SERUM: CPT

## 2025-08-22 PROCEDURE — 3017F COLORECTAL CA SCREEN DOC REV: CPT | Performed by: NURSE PRACTITIONER

## 2025-08-22 PROCEDURE — 83521 IG LIGHT CHAINS FREE EACH: CPT

## 2025-08-22 PROCEDURE — 84165 PROTEIN E-PHORESIS SERUM: CPT

## 2025-08-22 PROCEDURE — 82784 ASSAY IGA/IGD/IGG/IGM EACH: CPT

## 2025-08-22 PROCEDURE — 36415 COLL VENOUS BLD VENIPUNCTURE: CPT

## 2025-08-22 PROCEDURE — 1126F AMNT PAIN NOTED NONE PRSNT: CPT | Performed by: NURSE PRACTITIONER

## 2025-08-22 PROCEDURE — 86334 IMMUNOFIX E-PHORESIS SERUM: CPT

## 2025-08-22 PROCEDURE — 80053 COMPREHEN METABOLIC PANEL: CPT

## 2025-08-22 PROCEDURE — 99212 OFFICE O/P EST SF 10 MIN: CPT

## 2025-08-22 PROCEDURE — 83883 ASSAY NEPHELOMETRY NOT SPEC: CPT

## 2025-08-22 PROCEDURE — 1159F MED LIST DOCD IN RCRD: CPT | Performed by: NURSE PRACTITIONER

## 2025-08-22 PROCEDURE — 1090F PRES/ABSN URINE INCON ASSESS: CPT | Performed by: NURSE PRACTITIONER

## 2025-08-22 PROCEDURE — G8399 PT W/DXA RESULTS DOCUMENT: HCPCS | Performed by: NURSE PRACTITIONER

## 2025-08-22 PROCEDURE — 82232 ASSAY OF BETA-2 PROTEIN: CPT

## 2025-08-22 PROCEDURE — G8420 CALC BMI NORM PARAMETERS: HCPCS | Performed by: NURSE PRACTITIONER

## 2025-08-22 PROCEDURE — 1036F TOBACCO NON-USER: CPT | Performed by: NURSE PRACTITIONER

## 2025-08-22 PROCEDURE — 1123F ACP DISCUSS/DSCN MKR DOCD: CPT | Performed by: NURSE PRACTITIONER

## 2025-08-22 PROCEDURE — 99213 OFFICE O/P EST LOW 20 MIN: CPT | Performed by: NURSE PRACTITIONER

## 2025-08-22 PROCEDURE — 85025 COMPLETE CBC W/AUTO DIFF WBC: CPT

## 2025-08-22 PROCEDURE — G8427 DOCREV CUR MEDS BY ELIG CLIN: HCPCS | Performed by: NURSE PRACTITIONER

## 2025-08-26 LAB
ALBUMIN SERPL-MCNC: 4.19 G/DL (ref 3.75–5.01)
ALPHA1 GLOB SERPL ELPH-MCNC: 0.23 G/DL (ref 0.19–0.46)
ALPHA2 GLOB SERPL ELPH-MCNC: 0.7 G/DL (ref 0.48–1.05)
B-GLOBULIN SERPL ELPH-MCNC: 1.61 G/DL (ref 0.48–1.1)
B2 MICROGLOB SERPL-MCNC: 1.8 MG/L
DEPRECATED KAPPA LC FREE/LAMBDA SER: 15.13 {RATIO} (ref 0.26–1.65)
EER MONOCLONAL PROTEIN AND FLC, SERUM: ABNORMAL
GAMMA GLOB SERPL ELPH-MCNC: 1.97 G/DL (ref 0.62–1.51)
IGA SERPL-MCNC: 47 MG/DL (ref 68–408)
IGG SERPL-MCNC: 2991 MG/DL (ref 768–1632)
IGM SERPL-MCNC: 36 MG/DL (ref 35–263)
INTERPRETATION SERPL IFE-IMP: ABNORMAL
INTERPRETATION SERPL IFE-IMP: ABNORMAL
KAPPA LC FREE SER-MCNC: 136.44 MG/L (ref 3.3–19.4)
LAMBDA LC FREE SERPL-MCNC: 9.02 MG/L (ref 5.71–26.3)
MONOCLONAL PROTEIN, SERUM: 2.89 G/DL
PROT SERPL-MCNC: 8.7 G/DL (ref 6.3–8.2)

## 2025-09-01 ASSESSMENT — ENCOUNTER SYMPTOMS
EYE PAIN: 0
DIARRHEA: 0
BACK PAIN: 0
WHEEZING: 0
SORE THROAT: 0
EYE REDNESS: 0
ABDOMINAL PAIN: 0
GASTROINTESTINAL NEGATIVE: 1
RESPIRATORY NEGATIVE: 1
BLOOD IN STOOL: 0
NAUSEA: 0
VOMITING: 0
EYES NEGATIVE: 1
CONSTIPATION: 0
EYE DISCHARGE: 0
COUGH: 0
SHORTNESS OF BREATH: 0